# Patient Record
Sex: MALE | Race: OTHER | NOT HISPANIC OR LATINO
[De-identification: names, ages, dates, MRNs, and addresses within clinical notes are randomized per-mention and may not be internally consistent; named-entity substitution may affect disease eponyms.]

---

## 2017-01-09 ENCOUNTER — MEDICATION RENEWAL (OUTPATIENT)
Age: 72
End: 2017-01-09

## 2017-01-09 ENCOUNTER — NON-APPOINTMENT (OUTPATIENT)
Age: 72
End: 2017-01-09

## 2017-01-09 ENCOUNTER — APPOINTMENT (OUTPATIENT)
Dept: CARDIOLOGY | Facility: CLINIC | Age: 72
End: 2017-01-09

## 2017-01-09 VITALS
DIASTOLIC BLOOD PRESSURE: 74 MMHG | BODY MASS INDEX: 22.58 KG/M2 | SYSTOLIC BLOOD PRESSURE: 132 MMHG | HEART RATE: 66 BPM | HEIGHT: 68 IN | OXYGEN SATURATION: 96 % | WEIGHT: 149 LBS

## 2017-01-09 LAB — INR PPP: 2.7

## 2017-01-11 ENCOUNTER — MEDICATION RENEWAL (OUTPATIENT)
Age: 72
End: 2017-01-11

## 2017-01-11 ENCOUNTER — APPOINTMENT (OUTPATIENT)
Dept: INTERNAL MEDICINE | Facility: CLINIC | Age: 72
End: 2017-01-11

## 2017-01-11 VITALS
OXYGEN SATURATION: 97 % | WEIGHT: 148 LBS | SYSTOLIC BLOOD PRESSURE: 132 MMHG | RESPIRATION RATE: 14 BRPM | DIASTOLIC BLOOD PRESSURE: 68 MMHG | BODY MASS INDEX: 22.43 KG/M2 | HEIGHT: 68 IN | HEART RATE: 60 BPM

## 2017-01-12 LAB
25(OH)D3 SERPL-MCNC: 23.8 NG/ML
ALBUMIN SERPL ELPH-MCNC: 4.5 G/DL
ALP BLD-CCNC: 83 U/L
ALT SERPL-CCNC: 26 U/L
ANION GAP SERPL CALC-SCNC: 14 MMOL/L
APPEARANCE: CLEAR
AST SERPL-CCNC: 33 U/L
BASOPHILS # BLD AUTO: 0.02 K/UL
BASOPHILS NFR BLD AUTO: 0.4 %
BILIRUB SERPL-MCNC: 0.7 MG/DL
BILIRUBIN URINE: NEGATIVE
BLOOD URINE: NEGATIVE
BUN SERPL-MCNC: 9 MG/DL
CALCIUM SERPL-MCNC: 9.2 MG/DL
CHLORIDE SERPL-SCNC: 97 MMOL/L
CHOLEST SERPL-MCNC: 147 MG/DL
CHOLEST/HDLC SERPL: 1.5 RATIO
CO2 SERPL-SCNC: 27 MMOL/L
COLOR: YELLOW
CREAT SERPL-MCNC: 0.8 MG/DL
EOSINOPHIL # BLD AUTO: 0.22 K/UL
EOSINOPHIL NFR BLD AUTO: 4.7 %
FOLATE SERPL-MCNC: 11.7 NG/ML
GLUCOSE QUALITATIVE U: NORMAL MG/DL
GLUCOSE SERPL-MCNC: 111 MG/DL
HBA1C MFR BLD HPLC: 6.8 %
HCT VFR BLD CALC: 45.8 %
HDLC SERPL-MCNC: 98 MG/DL
HGB BLD-MCNC: 14.7 G/DL
IMM GRANULOCYTES NFR BLD AUTO: 0.2 %
KETONES URINE: NEGATIVE
LDLC SERPL CALC-MCNC: 40 MG/DL
LEUKOCYTE ESTERASE URINE: NEGATIVE
LYMPHOCYTES # BLD AUTO: 0.95 K/UL
LYMPHOCYTES NFR BLD AUTO: 20.3 %
MAN DIFF?: NORMAL
MCHC RBC-ENTMCNC: 29.6 PG
MCHC RBC-ENTMCNC: 32.1 GM/DL
MCV RBC AUTO: 92.3 FL
MONOCYTES # BLD AUTO: 0.4 K/UL
MONOCYTES NFR BLD AUTO: 8.6 %
NEUTROPHILS # BLD AUTO: 3.07 K/UL
NEUTROPHILS NFR BLD AUTO: 65.8 %
NITRITE URINE: NEGATIVE
PH URINE: 6
PLATELET # BLD AUTO: 117 K/UL
POTASSIUM SERPL-SCNC: 4.1 MMOL/L
PROT SERPL-MCNC: 6.7 G/DL
PROTEIN URINE: NEGATIVE MG/DL
PSA SERPL-MCNC: 2.47 NG/ML
RBC # BLD: 4.96 M/UL
RBC # FLD: 13.9 %
SODIUM SERPL-SCNC: 138 MMOL/L
SPECIFIC GRAVITY URINE: 1.01
TRIGL SERPL-MCNC: 47 MG/DL
TSH SERPL-ACNC: 2.16 UIU/ML
URATE SERPL-MCNC: 3.1 MG/DL
UROBILINOGEN URINE: NORMAL MG/DL
VIT B12 SERPL-MCNC: 293 PG/ML
WBC # FLD AUTO: 4.67 K/UL

## 2017-01-26 ENCOUNTER — RX RENEWAL (OUTPATIENT)
Age: 72
End: 2017-01-26

## 2017-02-01 ENCOUNTER — MEDICATION RENEWAL (OUTPATIENT)
Age: 72
End: 2017-02-01

## 2017-02-27 LAB — INR PPP: 3.7

## 2017-03-17 LAB — INR PPP: 2.4

## 2017-03-31 LAB — INR PPP: 2.4

## 2017-04-06 ENCOUNTER — RX RENEWAL (OUTPATIENT)
Age: 72
End: 2017-04-06

## 2017-04-30 LAB — INR PPP: 2.5

## 2017-05-05 ENCOUNTER — APPOINTMENT (OUTPATIENT)
Dept: CARDIOLOGY | Facility: CLINIC | Age: 72
End: 2017-05-05

## 2017-05-10 ENCOUNTER — APPOINTMENT (OUTPATIENT)
Dept: CARDIOLOGY | Facility: CLINIC | Age: 72
End: 2017-05-10

## 2017-05-18 ENCOUNTER — APPOINTMENT (OUTPATIENT)
Dept: INTERNAL MEDICINE | Facility: CLINIC | Age: 72
End: 2017-05-18

## 2017-05-18 VITALS
BODY MASS INDEX: 22.73 KG/M2 | HEART RATE: 71 BPM | DIASTOLIC BLOOD PRESSURE: 62 MMHG | HEIGHT: 68 IN | SYSTOLIC BLOOD PRESSURE: 120 MMHG | OXYGEN SATURATION: 98 % | RESPIRATION RATE: 14 BRPM | TEMPERATURE: 98.4 F | WEIGHT: 150 LBS

## 2017-05-18 DIAGNOSIS — E56.9 VITAMIN DEFICIENCY, UNSPECIFIED: ICD-10-CM

## 2017-05-18 LAB — INR PPP: 4

## 2017-06-05 LAB — INR PPP: 2.4

## 2017-06-09 LAB
25(OH)D3 SERPL-MCNC: 33.8 NG/ML
ALBUMIN SERPL ELPH-MCNC: 4.6 G/DL
ALP BLD-CCNC: 90 U/L
ALT SERPL-CCNC: 35 U/L
ANION GAP SERPL CALC-SCNC: 11 MMOL/L
APPEARANCE: CLEAR
AST SERPL-CCNC: 29 U/L
BASOPHILS # BLD AUTO: 0.03 K/UL
BASOPHILS NFR BLD AUTO: 0.8 %
BILIRUB SERPL-MCNC: 0.7 MG/DL
BILIRUBIN URINE: NEGATIVE
BLOOD URINE: NEGATIVE
BUN SERPL-MCNC: 10 MG/DL
CALCIUM SERPL-MCNC: 9.5 MG/DL
CHLORIDE SERPL-SCNC: 98 MMOL/L
CHOLEST SERPL-MCNC: 133 MG/DL
CHOLEST/HDLC SERPL: 1.6 RATIO
CO2 SERPL-SCNC: 25 MMOL/L
COLOR: YELLOW
CREAT SERPL-MCNC: 0.92 MG/DL
CREAT SPEC-SCNC: 99 MG/DL
EOSINOPHIL # BLD AUTO: 0.26 K/UL
EOSINOPHIL NFR BLD AUTO: 6.8 %
FOLATE SERPL-MCNC: 13.2 NG/ML
GLUCOSE QUALITATIVE U: NORMAL MG/DL
GLUCOSE SERPL-MCNC: 152 MG/DL
HBA1C MFR BLD HPLC: 7.5 %
HCT VFR BLD CALC: 42.5 %
HDLC SERPL-MCNC: 81 MG/DL
HGB BLD-MCNC: 13.8 G/DL
IMM GRANULOCYTES NFR BLD AUTO: 0.5 %
KETONES URINE: NEGATIVE
LDLC SERPL CALC-MCNC: 43 MG/DL
LEUKOCYTE ESTERASE URINE: NEGATIVE
LYMPHOCYTES # BLD AUTO: 1.04 K/UL
LYMPHOCYTES NFR BLD AUTO: 27.2 %
MAN DIFF?: NORMAL
MCHC RBC-ENTMCNC: 29 PG
MCHC RBC-ENTMCNC: 32.5 GM/DL
MCV RBC AUTO: 89.3 FL
MICROALBUMIN 24H UR DL<=1MG/L-MCNC: 3.3 MG/DL
MICROALBUMIN/CREAT 24H UR-RTO: 33
MONOCYTES # BLD AUTO: 0.34 K/UL
MONOCYTES NFR BLD AUTO: 8.9 %
NEUTROPHILS # BLD AUTO: 2.14 K/UL
NEUTROPHILS NFR BLD AUTO: 55.8 %
NITRITE URINE: NEGATIVE
PH URINE: 6.5
PLATELET # BLD AUTO: 121 K/UL
POTASSIUM SERPL-SCNC: 4.7 MMOL/L
PROT SERPL-MCNC: 6.5 G/DL
PROTEIN URINE: NEGATIVE MG/DL
RBC # BLD: 4.76 M/UL
RBC # FLD: 13.2 %
SODIUM SERPL-SCNC: 134 MMOL/L
SPECIFIC GRAVITY URINE: 1.02
TRIGL SERPL-MCNC: 45 MG/DL
TSH SERPL-ACNC: 2.07 UIU/ML
URATE SERPL-MCNC: 3.1 MG/DL
UROBILINOGEN URINE: 1 MG/DL
VIT B12 SERPL-MCNC: 428 PG/ML
WBC # FLD AUTO: 3.83 K/UL

## 2017-06-14 LAB — INR PPP: 2.6

## 2017-06-28 ENCOUNTER — APPOINTMENT (OUTPATIENT)
Dept: INTERNAL MEDICINE | Facility: CLINIC | Age: 72
End: 2017-06-28

## 2017-06-28 VITALS
TEMPERATURE: 98.1 F | HEIGHT: 68 IN | BODY MASS INDEX: 23.19 KG/M2 | SYSTOLIC BLOOD PRESSURE: 120 MMHG | WEIGHT: 153 LBS | HEART RATE: 68 BPM | DIASTOLIC BLOOD PRESSURE: 70 MMHG | RESPIRATION RATE: 14 BRPM | OXYGEN SATURATION: 98 %

## 2017-07-10 ENCOUNTER — RX RENEWAL (OUTPATIENT)
Age: 72
End: 2017-07-10

## 2017-07-10 LAB — INR PPP: 2.9

## 2017-07-27 ENCOUNTER — APPOINTMENT (OUTPATIENT)
Dept: INTERNAL MEDICINE | Facility: CLINIC | Age: 72
End: 2017-07-27
Payer: MEDICARE

## 2017-07-27 VITALS
HEIGHT: 68 IN | WEIGHT: 153 LBS | SYSTOLIC BLOOD PRESSURE: 100 MMHG | OXYGEN SATURATION: 98 % | TEMPERATURE: 98.1 F | DIASTOLIC BLOOD PRESSURE: 70 MMHG | BODY MASS INDEX: 23.19 KG/M2 | HEART RATE: 66 BPM | RESPIRATION RATE: 14 BRPM

## 2017-07-27 VITALS — SYSTOLIC BLOOD PRESSURE: 114 MMHG | DIASTOLIC BLOOD PRESSURE: 70 MMHG

## 2017-07-27 DIAGNOSIS — Z23 ENCOUNTER FOR IMMUNIZATION: ICD-10-CM

## 2017-07-27 DIAGNOSIS — Z01.818 ENCOUNTER FOR OTHER PREPROCEDURAL EXAMINATION: ICD-10-CM

## 2017-07-27 DIAGNOSIS — J06.9 ACUTE UPPER RESPIRATORY INFECTION, UNSPECIFIED: ICD-10-CM

## 2017-07-27 DIAGNOSIS — Z86.39 PERSONAL HISTORY OF OTHER ENDOCRINE, NUTRITIONAL AND METABOLIC DISEASE: ICD-10-CM

## 2017-07-27 DIAGNOSIS — Z86.59 PERSONAL HISTORY OF OTHER MENTAL AND BEHAVIORAL DISORDERS: ICD-10-CM

## 2017-07-27 LAB — INR PPP: 2.3

## 2017-07-27 PROCEDURE — 99214 OFFICE O/P EST MOD 30 MIN: CPT

## 2017-08-14 LAB — INR PPP: 2.6

## 2017-08-29 ENCOUNTER — APPOINTMENT (OUTPATIENT)
Dept: CARDIOLOGY | Facility: CLINIC | Age: 72
End: 2017-08-29
Payer: MEDICARE

## 2017-08-29 VITALS
WEIGHT: 150 LBS | HEIGHT: 68 IN | BODY MASS INDEX: 22.73 KG/M2 | OXYGEN SATURATION: 100 % | HEART RATE: 68 BPM | DIASTOLIC BLOOD PRESSURE: 80 MMHG | SYSTOLIC BLOOD PRESSURE: 158 MMHG

## 2017-08-29 PROCEDURE — 99214 OFFICE O/P EST MOD 30 MIN: CPT

## 2017-09-04 LAB — INR PPP: 3.4

## 2017-09-23 LAB
ALBUMIN SERPL ELPH-MCNC: 4.1 G/DL
ALP BLD-CCNC: 70 U/L
ALT SERPL-CCNC: 25 U/L
ANION GAP SERPL CALC-SCNC: 17 MMOL/L
AST SERPL-CCNC: 21 U/L
BILIRUB SERPL-MCNC: 0.6 MG/DL
BUN SERPL-MCNC: 12 MG/DL
CALCIUM SERPL-MCNC: 9.6 MG/DL
CHLORIDE SERPL-SCNC: 99 MMOL/L
CHOLEST SERPL-MCNC: 148 MG/DL
CHOLEST/HDLC SERPL: 1.9 RATIO
CO2 SERPL-SCNC: 24 MMOL/L
CREAT SERPL-MCNC: 0.93 MG/DL
GLUCOSE SERPL-MCNC: 119 MG/DL
HBA1C MFR BLD HPLC: 7.3 %
HDLC SERPL-MCNC: 80 MG/DL
LDLC SERPL CALC-MCNC: 54 MG/DL
POTASSIUM SERPL-SCNC: 4.8 MMOL/L
PROT SERPL-MCNC: 6.9 G/DL
PSA SERPL-MCNC: 2.77 NG/ML
SODIUM SERPL-SCNC: 140 MMOL/L
TRIGL SERPL-MCNC: 69 MG/DL

## 2017-09-25 LAB — INR PPP: 3.7

## 2017-10-13 LAB — INR PPP: 2.3

## 2017-10-30 ENCOUNTER — APPOINTMENT (OUTPATIENT)
Dept: INTERNAL MEDICINE | Facility: CLINIC | Age: 72
End: 2017-10-30
Payer: MEDICARE

## 2017-10-30 VITALS
OXYGEN SATURATION: 98 % | TEMPERATURE: 98 F | HEIGHT: 68 IN | RESPIRATION RATE: 14 BRPM | DIASTOLIC BLOOD PRESSURE: 70 MMHG | SYSTOLIC BLOOD PRESSURE: 120 MMHG | BODY MASS INDEX: 22.43 KG/M2 | HEART RATE: 79 BPM | WEIGHT: 148 LBS

## 2017-10-30 DIAGNOSIS — K40.90 UNILATERAL INGUINAL HERNIA, W/OUT OBSTRUCTION OR GANGRENE, NOT SPECIFIED AS RECURRENT: ICD-10-CM

## 2017-10-30 DIAGNOSIS — Z23 ENCOUNTER FOR IMMUNIZATION: ICD-10-CM

## 2017-10-30 PROCEDURE — 90686 IIV4 VACC NO PRSV 0.5 ML IM: CPT

## 2017-10-30 PROCEDURE — 99214 OFFICE O/P EST MOD 30 MIN: CPT | Mod: 25

## 2017-10-30 PROCEDURE — G0008: CPT

## 2017-11-02 LAB — INR PPP: 2.2

## 2017-11-14 LAB — INR PPP: 2.6

## 2017-12-11 LAB — INR PPP: 3.1

## 2018-01-09 LAB — INR PPP: 3.1

## 2018-01-17 ENCOUNTER — APPOINTMENT (OUTPATIENT)
Dept: INTERNAL MEDICINE | Facility: CLINIC | Age: 73
End: 2018-01-17
Payer: MEDICARE

## 2018-01-17 VITALS
RESPIRATION RATE: 14 BRPM | DIASTOLIC BLOOD PRESSURE: 72 MMHG | OXYGEN SATURATION: 97 % | HEART RATE: 71 BPM | WEIGHT: 153 LBS | SYSTOLIC BLOOD PRESSURE: 140 MMHG | HEIGHT: 68 IN | BODY MASS INDEX: 23.19 KG/M2

## 2018-01-17 DIAGNOSIS — Z79.01 LONG TERM (CURRENT) USE OF ANTICOAGULANTS: ICD-10-CM

## 2018-01-17 DIAGNOSIS — K43.9 VENTRAL HERNIA W/OUT OBSTRUCTION OR GANGRENE: ICD-10-CM

## 2018-01-17 DIAGNOSIS — I87.2 VENOUS INSUFFICIENCY (CHRONIC) (PERIPHERAL): ICD-10-CM

## 2018-01-17 DIAGNOSIS — R21 RASH AND OTHER NONSPECIFIC SKIN ERUPTION: ICD-10-CM

## 2018-01-17 DIAGNOSIS — R53.83 OTHER FATIGUE: ICD-10-CM

## 2018-01-17 PROCEDURE — G0439: CPT

## 2018-01-17 RX ORDER — PRAMIPEXOLE DIHYDROCHLORIDE 0.75 MG/1
0.75 TABLET, EXTENDED RELEASE ORAL 3 TIMES DAILY
Qty: 270 | Refills: 3 | Status: ACTIVE | COMMUNITY
Start: 2018-01-17 | End: 1900-01-01

## 2018-01-18 LAB
25(OH)D3 SERPL-MCNC: 34.3 NG/ML
ALBUMIN SERPL ELPH-MCNC: 4.5 G/DL
ALP BLD-CCNC: 78 U/L
ALT SERPL-CCNC: 34 U/L
ANION GAP SERPL CALC-SCNC: 13 MMOL/L
APPEARANCE: CLEAR
AST SERPL-CCNC: 29 U/L
BASOPHILS # BLD AUTO: 0.02 K/UL
BASOPHILS NFR BLD AUTO: 0.4 %
BILIRUB SERPL-MCNC: 0.6 MG/DL
BILIRUBIN URINE: NEGATIVE
BLOOD URINE: NEGATIVE
BUN SERPL-MCNC: 11 MG/DL
CALCIUM SERPL-MCNC: 9.7 MG/DL
CHLORIDE SERPL-SCNC: 96 MMOL/L
CHOLEST SERPL-MCNC: 155 MG/DL
CHOLEST/HDLC SERPL: 1.6 RATIO
CO2 SERPL-SCNC: 27 MMOL/L
COLOR: YELLOW
CREAT SERPL-MCNC: 0.95 MG/DL
CREAT SPEC-SCNC: 69 MG/DL
EOSINOPHIL # BLD AUTO: 0.19 K/UL
EOSINOPHIL NFR BLD AUTO: 3.9 %
FOLATE SERPL-MCNC: 19.1 NG/ML
GLUCOSE QUALITATIVE U: 100 MG/DL
GLUCOSE SERPL-MCNC: 170 MG/DL
HBA1C MFR BLD HPLC: 7.8 %
HCT VFR BLD CALC: 42.9 %
HDLC SERPL-MCNC: 100 MG/DL
HGB BLD-MCNC: 14.1 G/DL
IMM GRANULOCYTES NFR BLD AUTO: 0.2 %
KETONES URINE: NEGATIVE
LDLC SERPL CALC-MCNC: 46 MG/DL
LEUKOCYTE ESTERASE URINE: NEGATIVE
LYMPHOCYTES # BLD AUTO: 0.94 K/UL
LYMPHOCYTES NFR BLD AUTO: 19.1 %
MAN DIFF?: NORMAL
MCHC RBC-ENTMCNC: 29.2 PG
MCHC RBC-ENTMCNC: 32.9 GM/DL
MCV RBC AUTO: 88.8 FL
MICROALBUMIN 24H UR DL<=1MG/L-MCNC: 0.5 MG/DL
MICROALBUMIN/CREAT 24H UR-RTO: 7 MG/G
MONOCYTES # BLD AUTO: 0.38 K/UL
MONOCYTES NFR BLD AUTO: 7.7 %
NEUTROPHILS # BLD AUTO: 3.37 K/UL
NEUTROPHILS NFR BLD AUTO: 68.7 %
NITRITE URINE: NEGATIVE
PH URINE: 6.5
PLATELET # BLD AUTO: 127 K/UL
POTASSIUM SERPL-SCNC: 4.3 MMOL/L
PROT SERPL-MCNC: 7.3 G/DL
PROTEIN URINE: NEGATIVE MG/DL
PSA SERPL-MCNC: 2.55 NG/ML
RBC # BLD: 4.83 M/UL
RBC # FLD: 13.5 %
SODIUM SERPL-SCNC: 136 MMOL/L
SPECIFIC GRAVITY URINE: 1.01
TRIGL SERPL-MCNC: 45 MG/DL
TSH SERPL-ACNC: 2.44 UIU/ML
URATE SERPL-MCNC: 2.9 MG/DL
UROBILINOGEN URINE: NEGATIVE MG/DL
VIT B12 SERPL-MCNC: 472 PG/ML
WBC # FLD AUTO: 4.91 K/UL

## 2018-01-24 ENCOUNTER — NON-APPOINTMENT (OUTPATIENT)
Age: 73
End: 2018-01-24

## 2018-01-24 ENCOUNTER — APPOINTMENT (OUTPATIENT)
Dept: CARDIOLOGY | Facility: CLINIC | Age: 73
End: 2018-01-24
Payer: MEDICARE

## 2018-01-24 VITALS
BODY MASS INDEX: 23.19 KG/M2 | HEART RATE: 72 BPM | HEIGHT: 68 IN | OXYGEN SATURATION: 96 % | SYSTOLIC BLOOD PRESSURE: 125 MMHG | DIASTOLIC BLOOD PRESSURE: 68 MMHG | WEIGHT: 153 LBS

## 2018-01-24 PROCEDURE — 93000 ELECTROCARDIOGRAM COMPLETE: CPT

## 2018-01-24 PROCEDURE — 99214 OFFICE O/P EST MOD 30 MIN: CPT

## 2018-02-01 ENCOUNTER — APPOINTMENT (OUTPATIENT)
Dept: DERMATOLOGY | Facility: CLINIC | Age: 73
End: 2018-02-01
Payer: MEDICARE

## 2018-02-01 VITALS
BODY MASS INDEX: 22.73 KG/M2 | SYSTOLIC BLOOD PRESSURE: 130 MMHG | DIASTOLIC BLOOD PRESSURE: 82 MMHG | HEIGHT: 68 IN | WEIGHT: 150 LBS

## 2018-02-01 PROCEDURE — 99203 OFFICE O/P NEW LOW 30 MIN: CPT

## 2018-02-02 LAB — INR PPP: 2.4

## 2018-02-27 LAB — INR PPP: 2.6

## 2018-03-01 ENCOUNTER — APPOINTMENT (OUTPATIENT)
Dept: DERMATOLOGY | Facility: CLINIC | Age: 73
End: 2018-03-01

## 2018-03-12 ENCOUNTER — RX RENEWAL (OUTPATIENT)
Age: 73
End: 2018-03-12

## 2018-03-19 LAB — INR PPP: 2.8

## 2018-04-11 LAB — INR PPP: 3.4

## 2018-04-30 LAB — INR PPP: 4

## 2018-05-22 LAB — INR PPP: 3

## 2018-06-19 LAB — INR PPP: 3

## 2018-07-16 ENCOUNTER — APPOINTMENT (OUTPATIENT)
Dept: CARDIOLOGY | Facility: CLINIC | Age: 73
End: 2018-07-16
Payer: MEDICARE

## 2018-07-16 VITALS
BODY MASS INDEX: 22.88 KG/M2 | HEIGHT: 68 IN | SYSTOLIC BLOOD PRESSURE: 164 MMHG | HEART RATE: 75 BPM | OXYGEN SATURATION: 98 % | WEIGHT: 151 LBS | DIASTOLIC BLOOD PRESSURE: 78 MMHG

## 2018-07-16 LAB
INR PPP: 4
INR PPP: 5.6

## 2018-07-16 PROCEDURE — 99214 OFFICE O/P EST MOD 30 MIN: CPT

## 2018-07-30 LAB — INR PPP: 2.5

## 2018-08-21 ENCOUNTER — APPOINTMENT (OUTPATIENT)
Dept: CARDIOLOGY | Facility: CLINIC | Age: 73
End: 2018-08-21

## 2018-08-22 ENCOUNTER — RESULT REVIEW (OUTPATIENT)
Age: 73
End: 2018-08-22

## 2018-08-22 LAB — INR PPP: 3.7

## 2018-09-04 ENCOUNTER — RX RENEWAL (OUTPATIENT)
Age: 73
End: 2018-09-04

## 2018-09-10 LAB — INR PPP: 2.1

## 2018-10-04 LAB — INR PPP: 4.7

## 2018-10-31 LAB — INR PPP: 3.5

## 2018-12-17 ENCOUNTER — MEDICATION RENEWAL (OUTPATIENT)
Age: 73
End: 2018-12-17

## 2018-12-17 LAB — INR PPP: 1.9

## 2018-12-27 LAB — INR PPP: 2.2

## 2019-01-03 ENCOUNTER — APPOINTMENT (OUTPATIENT)
Dept: CARDIOLOGY | Facility: CLINIC | Age: 74
End: 2019-01-03
Payer: MEDICARE

## 2019-01-03 ENCOUNTER — NON-APPOINTMENT (OUTPATIENT)
Age: 74
End: 2019-01-03

## 2019-01-03 VITALS
SYSTOLIC BLOOD PRESSURE: 133 MMHG | OXYGEN SATURATION: 99 % | DIASTOLIC BLOOD PRESSURE: 77 MMHG | HEART RATE: 75 BPM | BODY MASS INDEX: 22.73 KG/M2 | HEIGHT: 68 IN | WEIGHT: 150 LBS

## 2019-01-03 DIAGNOSIS — R07.9 CHEST PAIN, UNSPECIFIED: ICD-10-CM

## 2019-01-03 PROCEDURE — 99215 OFFICE O/P EST HI 40 MIN: CPT

## 2019-01-03 NOTE — PHYSICAL EXAM
[General Appearance - Well Developed] : well developed [Normal Appearance] : normal appearance [Well Groomed] : well groomed [General Appearance - Well Nourished] : well nourished [No Deformities] : no deformities [General Appearance - In No Acute Distress] : no acute distress [Normal Conjunctiva] : the conjunctiva exhibited no abnormalities [Normal Oral Mucosa] : normal oral mucosa [Normal Jugular Venous A Waves Present] : normal jugular venous A waves present [Normal Jugular Venous V Waves Present] : normal jugular venous V waves present [No Jugular Venous Weathers A Waves] : no jugular venous weathers A waves [Respiration, Rhythm And Depth] : normal respiratory rhythm and effort [Exaggerated Use Of Accessory Muscles For Inspiration] : no accessory muscle use [Auscultation Breath Sounds / Voice Sounds] : lungs were clear to auscultation bilaterally [Bowel Sounds] : normal bowel sounds [Abdomen Soft] : soft [Abdomen Tenderness] : non-tender [Abnormal Walk] : normal gait [Nail Clubbing] : no clubbing of the fingernails [Cyanosis, Localized] : no localized cyanosis [Skin Color & Pigmentation] : normal skin color and pigmentation [Skin Turgor] : normal skin turgor [] : no rash [Oriented To Time, Place, And Person] : oriented to person, place, and time [Impaired Insight] : insight and judgment were intact [No Anxiety] : not feeling anxious [5th Left ICS - MCL] : palpated at the 5th LICS in the midclavicular line [Normal] : normal [No Precordial Heave] : no precordial heave was noted [Normal Rate] : normal [Rhythm Regular] : regular [Normal S1] : normal S1 [Normal S2] : normal S2 [No Gallop] : no gallop heard [Prosthetic Aortic Valve] : prosthetic aortic valve heard [III] : a grade 3 [2+] : left 2+ [1+] : right 1+ [No Abnormalities] : the abdominal aorta was not enlarged and no bruit was heard [No Pitting Edema] : no pitting edema present [Apical Thrill] : no thrill palpable at the apex

## 2019-01-03 NOTE — REVIEW OF SYSTEMS
[Eyeglasses] : currently wearing eyeglasses [Urinary Frequency] : urinary frequency [Tremor] : a tremor was seen [Easy Bleeding] : a tendency for easy bleeding [Negative] : Genitourinary [Fever] : no fever [Headache] : no headache [Chills] : no chills [Feeling Fatigued] : not feeling fatigued [Blurry Vision] : no blurred vision [Seeing Double (Diplopia)] : no diplopia [Earache] : no earache [Sore Throat] : no sore throat [Sinus Pressure] : no sinus pressure [Joint Pain] : no joint pain [Skin: A Rash] : no rash: [Numbness (Hypesthesia)] : no numbness [Tingling (Paresthesia)] : no tingling [Depression] : no depression [Anxiety] : no anxiety [Excessive Thirst] : no polydipsia [Easy Bruising] : no tendency for easy bruising

## 2019-01-03 NOTE — HISTORY OF PRESENT ILLNESS
[FreeTextEntry1] : I saw Roxy Lyons in the office today for a routine cardiac followup. He is a 73-year-old  man who is status post aortic valve replacement in 2004 with a mechanical valve for bicuspid aortic stenosis. At that time he had a bypass to the circumflex artery. The other arteries showed nonobstructive disease. He also is being treated for hyperlipidemia, and hypertension.\par \par His Parkinson's disease limits his physical activity.  Otherwise he has no chest pain, lightheadedness, palpitations.  More recently he has noted increasing dyspnea on exertion.. He had an echocardiogram performed 5/17 that demonstrated ejection fraction of 70%. Peak aortic gradient increased from 41-55 stage II diastolic dysfunction.  He had a stress test in 8/15 that showed no ischemia to a workload of 12.8 METs. He had carotid Doppler 5/17  that showed mild plaque without change compared to 2014.\par \par Most recent blood work performed 1/18 demonstrates a total cholesterol of 155, triglycerides 145, , LDL 46. A1c was 7.8.The patient has been eating more sugars in the diet lately which he thinks accounts for the increase in the A1c. He is going to try to control this better.\par \par The patient's blood pressure today is excellent. His having no symptoms of orthostatic hypotension. Unfortunately his Parkinson's disease is getting progressively worse..\par \par Over the past 6 months the patient has noted a slight discomfort in his back area by the neck when he exercises. After about 4 minutes on the treadmill it seems to go away. There is no anterior chest pain and no shortness of breath. He otherwise has no other new symptoms.

## 2019-01-03 NOTE — REASON FOR VISIT
[Follow-Up - Clinic] : a clinic follow-up of [Aortic Stenosis] : aortic stenosis [Coronary Artery Disease] : coronary artery disease [Hyperlipidemia] : hyperlipidemia [Prosthetic Valve] : a prosthetic valve [FreeTextEntry1] : Parkinson's disease, diabetes

## 2019-01-09 ENCOUNTER — RESULT REVIEW (OUTPATIENT)
Age: 74
End: 2019-01-09

## 2019-01-09 LAB — INR PPP: 4.7

## 2019-01-10 ENCOUNTER — APPOINTMENT (OUTPATIENT)
Dept: CARDIOLOGY | Facility: CLINIC | Age: 74
End: 2019-01-10
Payer: MEDICARE

## 2019-01-10 PROCEDURE — 93306 TTE W/DOPPLER COMPLETE: CPT

## 2019-01-12 ENCOUNTER — RX RENEWAL (OUTPATIENT)
Age: 74
End: 2019-01-12

## 2019-01-18 ENCOUNTER — APPOINTMENT (OUTPATIENT)
Dept: INTERNAL MEDICINE | Facility: CLINIC | Age: 74
End: 2019-01-18
Payer: MEDICARE

## 2019-01-18 VITALS
RESPIRATION RATE: 14 BRPM | HEART RATE: 64 BPM | OXYGEN SATURATION: 98 % | HEIGHT: 68 IN | DIASTOLIC BLOOD PRESSURE: 70 MMHG | TEMPERATURE: 97.8 F | BODY MASS INDEX: 22.13 KG/M2 | SYSTOLIC BLOOD PRESSURE: 112 MMHG | WEIGHT: 146 LBS

## 2019-01-18 LAB
BASOPHILS # BLD AUTO: 0.01 K/UL
BASOPHILS NFR BLD AUTO: 0.1 %
EOSINOPHIL # BLD AUTO: 0.19 K/UL
EOSINOPHIL NFR BLD AUTO: 2.8 %
HCT VFR BLD CALC: 42.3 %
HGB BLD-MCNC: 14 G/DL
IMM GRANULOCYTES NFR BLD AUTO: 0.1 %
LYMPHOCYTES # BLD AUTO: 0.87 K/UL
LYMPHOCYTES NFR BLD AUTO: 12.8 %
MAN DIFF?: NORMAL
MCHC RBC-ENTMCNC: 29.2 PG
MCHC RBC-ENTMCNC: 33.1 GM/DL
MCV RBC AUTO: 88.1 FL
MONOCYTES # BLD AUTO: 0.34 K/UL
MONOCYTES NFR BLD AUTO: 5 %
NEUTROPHILS # BLD AUTO: 5.38 K/UL
NEUTROPHILS NFR BLD AUTO: 79.2 %
PLATELET # BLD AUTO: 138 K/UL
RBC # BLD: 4.8 M/UL
RBC # FLD: 13.3 %
WBC # FLD AUTO: 6.8 K/UL

## 2019-01-18 PROCEDURE — G0439: CPT

## 2019-01-18 NOTE — HISTORY OF PRESENT ILLNESS
[FreeTextEntry1] : cpe [de-identified] : Pt is here for cpe. He checks home glucose. He has an endocrinologist and has appt next week. FG = 130 at home. Sees Dr Weiner.\par \par Last colonoscopy was in his early 50's.\par \par He saw a urologist last year.

## 2019-01-18 NOTE — PHYSICAL EXAM
[No Acute Distress] : no acute distress [Well Nourished] : well nourished [Well Developed] : well developed [Well-Appearing] : well-appearing [Normal Sclera/Conjunctiva] : normal sclera/conjunctiva [PERRL] : pupils equal round and reactive to light [EOMI] : extraocular movements intact [Normal Outer Ear/Nose] : the outer ears and nose were normal in appearance [Normal Oropharynx] : the oropharynx was normal [Normal TMs] : both tympanic membranes were normal [No JVD] : no jugular venous distention [Supple] : supple [No Lymphadenopathy] : no lymphadenopathy [Thyroid Normal, No Nodules] : the thyroid was normal and there were no nodules present [No Respiratory Distress] : no respiratory distress  [Clear to Auscultation] : lungs were clear to auscultation bilaterally [No Accessory Muscle Use] : no accessory muscle use [Normal Rate] : normal rate  [Regular Rhythm] : with a regular rhythm [Normal S1, S2] : normal S1 and S2 [No Murmur] : no murmur heard [Pedal Pulses Present] : the pedal pulses are present [No Edema] : there was no peripheral edema [Soft] : abdomen soft [Non Tender] : non-tender [No Joint Swelling] : no joint swelling [Grossly Normal Strength/Tone] : grossly normal strength/tone [Normal Affect] : the affect was normal [Normal Insight/Judgement] : insight and judgment were intact

## 2019-01-18 NOTE — HEALTH RISK ASSESSMENT
[] : No [No falls in past year] : Patient reported no falls in the past year [0] : 2) Feeling down, depressed, or hopeless: Not at all (0) [Change in mental status noted] : No change in mental status noted [Language] : denies difficulty with language [None] : None [Fully functional (bathing, dressing, toileting, transferring, walking, feeding)] : Fully functional (bathing, dressing, toileting, transferring, walking, feeding) [Fully functional (using the telephone, shopping, preparing meals, housekeeping, doing laundry, using] : Fully functional and needs no help or supervision to perform IADLs (using the telephone, shopping, preparing meals, housekeeping, doing laundry, using transportation, managing medications and managing finances) [Reports changes in hearing] : Reports no changes in hearing [Reports changes in vision] : Reports no changes in vision [Reports changes in dental health] : Reports no changes in dental health

## 2019-01-19 LAB
25(OH)D3 SERPL-MCNC: 29.6 NG/ML
ALBUMIN SERPL ELPH-MCNC: 4.2 G/DL
ALP BLD-CCNC: 68 U/L
ALT SERPL-CCNC: 26 U/L
ANION GAP SERPL CALC-SCNC: 8 MMOL/L
APPEARANCE: CLEAR
AST SERPL-CCNC: 27 U/L
BILIRUB SERPL-MCNC: 0.6 MG/DL
BILIRUBIN URINE: NEGATIVE
BLOOD URINE: NEGATIVE
BUN SERPL-MCNC: 9 MG/DL
CALCIUM SERPL-MCNC: 9.4 MG/DL
CHLORIDE SERPL-SCNC: 101 MMOL/L
CHOLEST SERPL-MCNC: 139 MG/DL
CHOLEST/HDLC SERPL: 1.9 RATIO
CO2 SERPL-SCNC: 29 MMOL/L
COLOR: YELLOW
CREAT SERPL-MCNC: 0.85 MG/DL
CREAT SPEC-SCNC: 125 MG/DL
FOLATE SERPL-MCNC: 11.7 NG/ML
GLUCOSE QUALITATIVE U: 100 MG/DL
GLUCOSE SERPL-MCNC: 159 MG/DL
HBA1C MFR BLD HPLC: 7.8 %
HDLC SERPL-MCNC: 74 MG/DL
KETONES URINE: NEGATIVE
LDLC SERPL CALC-MCNC: 49 MG/DL
LEUKOCYTE ESTERASE URINE: NEGATIVE
MICROALBUMIN 24H UR DL<=1MG/L-MCNC: 1.3 MG/DL
MICROALBUMIN/CREAT 24H UR-RTO: 10 MG/G
NITRITE URINE: NEGATIVE
PH URINE: 7
POTASSIUM SERPL-SCNC: 4.8 MMOL/L
PROT SERPL-MCNC: 6.8 G/DL
PROTEIN URINE: NEGATIVE MG/DL
PSA SERPL-MCNC: 2.74 NG/ML
SODIUM SERPL-SCNC: 138 MMOL/L
SPECIFIC GRAVITY URINE: 1.02
TRIGL SERPL-MCNC: 82 MG/DL
TSH SERPL-ACNC: 2.35 UIU/ML
URATE SERPL-MCNC: 2.9 MG/DL
UROBILINOGEN URINE: 1 MG/DL
VIT B12 SERPL-MCNC: 302 PG/ML

## 2019-01-23 ENCOUNTER — APPOINTMENT (OUTPATIENT)
Dept: CARDIOLOGY | Facility: CLINIC | Age: 74
End: 2019-01-23
Payer: MEDICARE

## 2019-01-23 PROCEDURE — A9500: CPT

## 2019-01-23 PROCEDURE — 93015 CV STRESS TEST SUPVJ I&R: CPT

## 2019-01-23 PROCEDURE — 78452 HT MUSCLE IMAGE SPECT MULT: CPT

## 2019-01-30 ENCOUNTER — RESULT REVIEW (OUTPATIENT)
Age: 74
End: 2019-01-30

## 2019-02-04 LAB — INR PPP: 3.1

## 2019-02-07 ENCOUNTER — APPOINTMENT (OUTPATIENT)
Dept: INTERNAL MEDICINE | Facility: CLINIC | Age: 74
End: 2019-02-07
Payer: MEDICARE

## 2019-02-07 VITALS
OXYGEN SATURATION: 98 % | HEART RATE: 67 BPM | DIASTOLIC BLOOD PRESSURE: 78 MMHG | RESPIRATION RATE: 14 BRPM | HEIGHT: 68 IN | WEIGHT: 150 LBS | BODY MASS INDEX: 22.73 KG/M2 | TEMPERATURE: 97.5 F | SYSTOLIC BLOOD PRESSURE: 130 MMHG

## 2019-02-07 DIAGNOSIS — R51 HEADACHE: ICD-10-CM

## 2019-02-07 PROCEDURE — 99214 OFFICE O/P EST MOD 30 MIN: CPT

## 2019-02-07 RX ORDER — AZITHROMYCIN 250 MG/1
250 TABLET, FILM COATED ORAL
Qty: 6 | Refills: 0 | Status: DISCONTINUED | COMMUNITY
Start: 2018-08-20 | End: 2019-02-07

## 2019-02-07 NOTE — HISTORY OF PRESENT ILLNESS
[FreeTextEntry8] : cc: headache\par \par Pt c/o sharp pain on top of head when he is in bed. Wakes him up and lasts 10-15 minutes. Happens about once a week. No nausea, dizziness, blurred vision. He has Parkinson's disease. It is happening more often over the last 6 months.\par \par When walking on treadmill he gets a funny feeling in his head.

## 2019-02-07 NOTE — PHYSICAL EXAM
[No Acute Distress] : no acute distress [Well Nourished] : well nourished [Well Developed] : well developed [Normal Oropharynx] : the oropharynx was normal [Normal TMs] : both tympanic membranes were normal [Supple] : supple [No Lymphadenopathy] : no lymphadenopathy [No Respiratory Distress] : no respiratory distress  [Clear to Auscultation] : lungs were clear to auscultation bilaterally [Normal Rate] : normal rate  [Regular Rhythm] : with a regular rhythm [No Edema] : there was no peripheral edema [Soft] : abdomen soft [Non Tender] : non-tender [Normal Affect] : the affect was normal [Normal Insight/Judgement] : insight and judgment were intact

## 2019-02-15 LAB — INR PPP: 3

## 2019-02-26 ENCOUNTER — APPOINTMENT (OUTPATIENT)
Dept: INTERNAL MEDICINE | Facility: CLINIC | Age: 74
End: 2019-02-26
Payer: MEDICARE

## 2019-02-26 VITALS
HEIGHT: 68 IN | OXYGEN SATURATION: 98 % | BODY MASS INDEX: 23.04 KG/M2 | WEIGHT: 152 LBS | RESPIRATION RATE: 14 BRPM | DIASTOLIC BLOOD PRESSURE: 74 MMHG | SYSTOLIC BLOOD PRESSURE: 112 MMHG | TEMPERATURE: 97.9 F | HEART RATE: 76 BPM

## 2019-02-26 DIAGNOSIS — Z87.09 PERSONAL HISTORY OF OTHER DISEASES OF THE RESPIRATORY SYSTEM: ICD-10-CM

## 2019-02-26 DIAGNOSIS — J30.9 ALLERGIC RHINITIS, UNSPECIFIED: ICD-10-CM

## 2019-02-26 PROCEDURE — 99214 OFFICE O/P EST MOD 30 MIN: CPT

## 2019-02-26 NOTE — PHYSICAL EXAM

## 2019-02-26 NOTE — HISTORY OF PRESENT ILLNESS
[FreeTextEntry1] : Has persistent cough for three weeks no fever or chills\par no wheezing no SOB \par worse at night \par has post nasal drip  \par

## 2019-02-28 ENCOUNTER — TRANSCRIPTION ENCOUNTER (OUTPATIENT)
Age: 74
End: 2019-02-28

## 2019-03-05 ENCOUNTER — APPOINTMENT (OUTPATIENT)
Dept: OTOLARYNGOLOGY | Facility: CLINIC | Age: 74
End: 2019-03-05

## 2019-03-19 ENCOUNTER — APPOINTMENT (OUTPATIENT)
Dept: FAMILY MEDICINE | Facility: CLINIC | Age: 74
End: 2019-03-19
Payer: MEDICARE

## 2019-03-19 VITALS
BODY MASS INDEX: 22.81 KG/M2 | SYSTOLIC BLOOD PRESSURE: 140 MMHG | RESPIRATION RATE: 14 BRPM | HEART RATE: 85 BPM | TEMPERATURE: 97.7 F | OXYGEN SATURATION: 98 % | DIASTOLIC BLOOD PRESSURE: 76 MMHG | WEIGHT: 150 LBS

## 2019-03-19 DIAGNOSIS — R19.7 DIARRHEA, UNSPECIFIED: ICD-10-CM

## 2019-03-19 LAB — INR PPP: 3.1

## 2019-03-19 PROCEDURE — 99213 OFFICE O/P EST LOW 20 MIN: CPT

## 2019-03-19 RX ORDER — BENZONATATE 100 MG/1
100 CAPSULE ORAL 3 TIMES DAILY
Qty: 21 | Refills: 0 | Status: DISCONTINUED | COMMUNITY
Start: 2019-02-07 | End: 2019-03-19

## 2019-03-19 RX ORDER — PREDNISOLONE ACETATE 10 MG/ML
1 SUSPENSION/ DROPS OPHTHALMIC
Qty: 5 | Refills: 0 | Status: DISCONTINUED | COMMUNITY
Start: 2018-09-21 | End: 2019-03-19

## 2019-03-19 RX ORDER — NYSTATIN AND TRIAMCINOLONE ACETONIDE 100000; 1 MG/G; MG/G
100000-0.1 CREAM TOPICAL TWICE DAILY
Qty: 1 | Refills: 0 | Status: DISCONTINUED | COMMUNITY
Start: 2018-01-17 | End: 2019-03-19

## 2019-03-19 RX ORDER — BETAMETHASONE DIPROPIONATE 0.5 MG/G
0.05 CREAM TOPICAL
Qty: 50 | Refills: 0 | Status: DISCONTINUED | COMMUNITY
Start: 2017-01-11 | End: 2019-03-19

## 2019-03-19 RX ORDER — DOXYCYCLINE 100 MG/1
100 TABLET, FILM COATED ORAL
Qty: 14 | Refills: 0 | Status: DISCONTINUED | COMMUNITY
Start: 2019-02-28 | End: 2019-03-19

## 2019-03-19 NOTE — PHYSICAL EXAM
[No Acute Distress] : no acute distress [Well Nourished] : well nourished [Well Developed] : well developed [Well-Appearing] : well-appearing [Normal Sclera/Conjunctiva] : normal sclera/conjunctiva [PERRL] : pupils equal round and reactive to light [EOMI] : extraocular movements intact [Normal Oropharynx] : the oropharynx was normal [Normal Outer Ear/Nose] : the outer ears and nose were normal in appearance [No JVD] : no jugular venous distention [Supple] : supple [No Lymphadenopathy] : no lymphadenopathy [Thyroid Normal, No Nodules] : the thyroid was normal and there were no nodules present [No Respiratory Distress] : no respiratory distress  [Clear to Auscultation] : lungs were clear to auscultation bilaterally [No Accessory Muscle Use] : no accessory muscle use [Normal Rate] : normal rate  [No Murmur] : no murmur heard [Normal S1, S2] : normal S1 and S2 [Regular Rhythm] : with a regular rhythm [No Carotid Bruits] : no carotid bruits [No Abdominal Bruit] : a ~M bruit was not heard ~T in the abdomen [No Varicosities] : no varicosities [No Edema] : there was no peripheral edema [Pedal Pulses Present] : the pedal pulses are present [Soft] : abdomen soft [No Palpable Aorta] : no palpable aorta [No Extremity Clubbing/Cyanosis] : no extremity clubbing/cyanosis [Non Tender] : non-tender [No Masses] : no abdominal mass palpated [Non-distended] : non-distended [Normal Bowel Sounds] : normal bowel sounds [No HSM] : no HSM [Normal Anterior Cervical Nodes] : no anterior cervical lymphadenopathy [Normal Posterior Cervical Nodes] : no posterior cervical lymphadenopathy [No CVA Tenderness] : no CVA  tenderness [No Spinal Tenderness] : no spinal tenderness [No Joint Swelling] : no joint swelling [Grossly Normal Strength/Tone] : grossly normal strength/tone [Normal Gait] : normal gait [No Rash] : no rash [Coordination Grossly Intact] : coordination grossly intact [Deep Tendon Reflexes (DTR)] : deep tendon reflexes were 2+ and symmetric [No Focal Deficits] : no focal deficits [Normal Affect] : the affect was normal [Normal Insight/Judgement] : insight and judgment were intact

## 2019-03-19 NOTE — PLAN
[FreeTextEntry1] : -viral gastro vs c diff \par -abdominal exam benign, non-tender \par -advised probiotic, BRAT diet, and staying well hydrated

## 2019-03-19 NOTE — HISTORY OF PRESENT ILLNESS
[FreeTextEntry8] : Pt with 4 days of loose stools. Took immodium which improved but then came back. No associated abdominal pain, just bloating and gas. Went 5 times last night but not during the day. Also having decreased appetite. Took doxy at end of february for bronchitis. No fevers or chills. No blood in the stool.

## 2019-03-20 LAB
ALBUMIN SERPL ELPH-MCNC: 4.3 G/DL
ALP BLD-CCNC: 74 U/L
ALT SERPL-CCNC: 23 U/L
ANION GAP SERPL CALC-SCNC: 14 MMOL/L
AST SERPL-CCNC: 24 U/L
BASOPHILS # BLD AUTO: 0.02 K/UL
BASOPHILS NFR BLD AUTO: 0.4 %
BILIRUB SERPL-MCNC: 0.8 MG/DL
BUN SERPL-MCNC: 11 MG/DL
C DIFF TOX GENS STL QL NAA+PROBE: NORMAL
CALCIUM SERPL-MCNC: 9.3 MG/DL
CDIFF BY PCR: NOT DETECTED
CHLORIDE SERPL-SCNC: 101 MMOL/L
CO2 SERPL-SCNC: 23 MMOL/L
CREAT SERPL-MCNC: 0.93 MG/DL
EOSINOPHIL # BLD AUTO: 0.26 K/UL
EOSINOPHIL NFR BLD AUTO: 4.9 %
GLUCOSE SERPL-MCNC: 117 MG/DL
HCT VFR BLD CALC: 44.6 %
HGB BLD-MCNC: 14.1 G/DL
IMM GRANULOCYTES NFR BLD AUTO: 0.2 %
LYMPHOCYTES # BLD AUTO: 0.8 K/UL
LYMPHOCYTES NFR BLD AUTO: 15.1 %
MAN DIFF?: NORMAL
MCHC RBC-ENTMCNC: 28.9 PG
MCHC RBC-ENTMCNC: 31.6 GM/DL
MCV RBC AUTO: 91.4 FL
MONOCYTES # BLD AUTO: 0.34 K/UL
MONOCYTES NFR BLD AUTO: 6.4 %
NEUTROPHILS # BLD AUTO: 3.88 K/UL
NEUTROPHILS NFR BLD AUTO: 73 %
PLATELET # BLD AUTO: 112 K/UL
POTASSIUM SERPL-SCNC: 4.2 MMOL/L
PROT SERPL-MCNC: 6.5 G/DL
RBC # BLD: 4.88 M/UL
RBC # FLD: 13.2 %
SODIUM SERPL-SCNC: 138 MMOL/L
WBC # FLD AUTO: 5.31 K/UL

## 2019-04-06 ENCOUNTER — CHART COPY (OUTPATIENT)
Age: 74
End: 2019-04-06

## 2019-04-08 LAB — INR PPP: 3.7

## 2019-04-11 ENCOUNTER — RX RENEWAL (OUTPATIENT)
Age: 74
End: 2019-04-11

## 2019-04-29 LAB — INR PPP: 2.1

## 2019-05-02 ENCOUNTER — APPOINTMENT (OUTPATIENT)
Dept: INTERNAL MEDICINE | Facility: CLINIC | Age: 74
End: 2019-05-02
Payer: MEDICARE

## 2019-05-02 VITALS
DIASTOLIC BLOOD PRESSURE: 60 MMHG | TEMPERATURE: 97.5 F | RESPIRATION RATE: 14 BRPM | HEIGHT: 68 IN | HEART RATE: 74 BPM | BODY MASS INDEX: 23.04 KG/M2 | OXYGEN SATURATION: 98 % | WEIGHT: 152 LBS | SYSTOLIC BLOOD PRESSURE: 110 MMHG

## 2019-05-02 DIAGNOSIS — J01.90 ACUTE SINUSITIS, UNSPECIFIED: ICD-10-CM

## 2019-05-02 PROCEDURE — 99214 OFFICE O/P EST MOD 30 MIN: CPT

## 2019-05-02 NOTE — HISTORY OF PRESENT ILLNESS
[Initial Evaluation] : an initial evaluation of [Nasal Congestion] : nasal congestion [Currently Experiencing] : The patient is currently experiencing symptoms. [Gradual] : gradual [___ Week(s) Ago] : [unfilled] week(s) ago [Frequent] : frequently [Daily] : occur daily [Moderate] : moderate in severity [Worsening] : worsening [Oral Antihistamine] : oral antihistamine [Chills] : chills [Cough] : cough [Fever] : no fever [Sore Throat] : no sore throat

## 2019-05-02 NOTE — REVIEW OF SYSTEMS
[Chills] : chills [Nasal Discharge] : nasal discharge [Cough] : cough [Fever] : no fever [Night Sweats] : no night sweats [Sore Throat] : no sore throat [Chest Pain] : no chest pain [Palpitations] : no palpitations [Lower Ext Edema] : no lower extremity edema [Shortness Of Breath] : no shortness of breath [Wheezing] : no wheezing [Dyspnea on Exertion] : not dyspnea on exertion [Abdominal Pain] : no abdominal pain [Nausea] : no nausea [Constipation] : no constipation [Diarrhea] : no diarrhea [Vomiting] : no vomiting [Dysuria] : no dysuria

## 2019-05-02 NOTE — PHYSICAL EXAM
[No Acute Distress] : no acute distress [Normal Sclera/Conjunctiva] : normal sclera/conjunctiva [PERRL] : pupils equal round and reactive to light [EOMI] : extraocular movements intact [No Respiratory Distress] : no respiratory distress  [Clear to Auscultation] : lungs were clear to auscultation bilaterally [No Accessory Muscle Use] : no accessory muscle use [Normal Rate] : normal rate  [Regular Rhythm] : with a regular rhythm [Soft] : abdomen soft [No Murmur] : no murmur heard [Normal S1, S2] : normal S1 and S2 [Non Tender] : non-tender [Normal Bowel Sounds] : normal bowel sounds [de-identified] : PND

## 2019-05-02 NOTE — ASSESSMENT
[FreeTextEntry1] : Rhinosinusitis: Symptoms worsening. Start ceftin BID. Follow-up 1 week if symptoms not improved. \par

## 2019-05-20 LAB — INR PPP: 2.5

## 2019-06-02 LAB — INR PPP: 2.5

## 2019-06-10 ENCOUNTER — APPOINTMENT (OUTPATIENT)
Dept: INTERNAL MEDICINE | Facility: CLINIC | Age: 74
End: 2019-06-10
Payer: MEDICARE

## 2019-06-10 VITALS
WEIGHT: 154 LBS | OXYGEN SATURATION: 98 % | BODY MASS INDEX: 23.42 KG/M2 | SYSTOLIC BLOOD PRESSURE: 160 MMHG | TEMPERATURE: 97.5 F | RESPIRATION RATE: 14 BRPM | HEART RATE: 71 BPM | DIASTOLIC BLOOD PRESSURE: 80 MMHG

## 2019-06-10 VITALS — DIASTOLIC BLOOD PRESSURE: 70 MMHG | SYSTOLIC BLOOD PRESSURE: 138 MMHG

## 2019-06-10 DIAGNOSIS — L08.9 LOCAL INFECTION OF THE SKIN AND SUBCUTANEOUS TISSUE, UNSPECIFIED: ICD-10-CM

## 2019-06-10 PROCEDURE — 99214 OFFICE O/P EST MOD 30 MIN: CPT

## 2019-06-10 NOTE — PHYSICAL EXAM
[No Acute Distress] : no acute distress [Well-Appearing] : well-appearing [Well Nourished] : well nourished [Well Developed] : well developed [Normal Sclera/Conjunctiva] : normal sclera/conjunctiva [PERRL] : pupils equal round and reactive to light [EOMI] : extraocular movements intact [Normal Outer Ear/Nose] : the outer ears and nose were normal in appearance [Normal Oropharynx] : the oropharynx was normal [Supple] : supple [No Lymphadenopathy] : no lymphadenopathy [No JVD] : no jugular venous distention [No Respiratory Distress] : no respiratory distress  [Thyroid Normal, No Nodules] : the thyroid was normal and there were no nodules present [Clear to Auscultation] : lungs were clear to auscultation bilaterally [No Accessory Muscle Use] : no accessory muscle use [Normal Rate] : normal rate  [Regular Rhythm] : with a regular rhythm [Normal S1, S2] : normal S1 and S2 [No Murmur] : no murmur heard [No Carotid Bruits] : no carotid bruits [No Abdominal Bruit] : a ~M bruit was not heard ~T in the abdomen [No Varicosities] : no varicosities [Pedal Pulses Present] : the pedal pulses are present [No Edema] : there was no peripheral edema [No Extremity Clubbing/Cyanosis] : no extremity clubbing/cyanosis [No Palpable Aorta] : no palpable aorta [Soft] : abdomen soft [Non Tender] : non-tender [Non-distended] : non-distended [No Masses] : no abdominal mass palpated [No HSM] : no HSM [Normal Bowel Sounds] : normal bowel sounds [Normal Anterior Cervical Nodes] : no anterior cervical lymphadenopathy [Normal Posterior Cervical Nodes] : no posterior cervical lymphadenopathy [No CVA Tenderness] : no CVA  tenderness [No Spinal Tenderness] : no spinal tenderness [No Joint Swelling] : no joint swelling [Grossly Normal Strength/Tone] : grossly normal strength/tone [No Rash] : no rash [Coordination Grossly Intact] : coordination grossly intact [Normal Gait] : normal gait [No Focal Deficits] : no focal deficits [Deep Tendon Reflexes (DTR)] : deep tendon reflexes were 2+ and symmetric [Normal Insight/Judgement] : insight and judgment were intact [Normal Affect] : the affect was normal [de-identified] : Large abrasion left shin; small abrasion right knee; moderate abrasion left deltoid region

## 2019-06-10 NOTE — HISTORY OF PRESENT ILLNESS
[FreeTextEntry8] : Pt ripped and fell while hiking 3 days ago. He has abrasions on both shins and on his left deltoid region.

## 2019-06-13 ENCOUNTER — APPOINTMENT (OUTPATIENT)
Dept: INTERNAL MEDICINE | Facility: CLINIC | Age: 74
End: 2019-06-13
Payer: MEDICARE

## 2019-06-13 ENCOUNTER — INPATIENT (INPATIENT)
Facility: HOSPITAL | Age: 74
LOS: 3 days | Discharge: HOME | DRG: 603 | End: 2019-06-17
Attending: FAMILY MEDICINE | Admitting: FAMILY MEDICINE
Payer: MEDICARE

## 2019-06-13 VITALS
TEMPERATURE: 98 F | RESPIRATION RATE: 18 BRPM | SYSTOLIC BLOOD PRESSURE: 178 MMHG | HEART RATE: 69 BPM | WEIGHT: 149.91 LBS | DIASTOLIC BLOOD PRESSURE: 82 MMHG | OXYGEN SATURATION: 99 %

## 2019-06-13 VITALS
RESPIRATION RATE: 14 BRPM | DIASTOLIC BLOOD PRESSURE: 74 MMHG | HEART RATE: 74 BPM | SYSTOLIC BLOOD PRESSURE: 170 MMHG | HEIGHT: 68 IN | OXYGEN SATURATION: 96 % | BODY MASS INDEX: 23.19 KG/M2 | WEIGHT: 153 LBS | TEMPERATURE: 97.6 F

## 2019-06-13 DIAGNOSIS — L03.116 CELLULITIS OF LEFT LOWER LIMB: ICD-10-CM

## 2019-06-13 DIAGNOSIS — E11.628 TYPE 2 DIABETES MELLITUS WITH OTHER SKIN COMPLICATIONS: ICD-10-CM

## 2019-06-13 DIAGNOSIS — G20 PARKINSON'S DISEASE: ICD-10-CM

## 2019-06-13 DIAGNOSIS — Z29.9 ENCOUNTER FOR PROPHYLACTIC MEASURES, UNSPECIFIED: ICD-10-CM

## 2019-06-13 DIAGNOSIS — I50.32 CHRONIC DIASTOLIC (CONGESTIVE) HEART FAILURE: ICD-10-CM

## 2019-06-13 DIAGNOSIS — L03.90 CELLULITIS, UNSPECIFIED: ICD-10-CM

## 2019-06-13 DIAGNOSIS — Z95.2 PRESENCE OF PROSTHETIC HEART VALVE: Chronic | ICD-10-CM

## 2019-06-13 DIAGNOSIS — Z95.1 PRESENCE OF AORTOCORONARY BYPASS GRAFT: ICD-10-CM

## 2019-06-13 DIAGNOSIS — Z95.2 PRESENCE OF PROSTHETIC HEART VALVE: ICD-10-CM

## 2019-06-13 DIAGNOSIS — Z95.1 PRESENCE OF AORTOCORONARY BYPASS GRAFT: Chronic | ICD-10-CM

## 2019-06-13 LAB
ALBUMIN SERPL ELPH-MCNC: 4.3 G/DL — SIGNIFICANT CHANGE UP (ref 3.3–5)
ALP SERPL-CCNC: 94 U/L — SIGNIFICANT CHANGE UP (ref 40–120)
ALT FLD-CCNC: 32 U/L — SIGNIFICANT CHANGE UP (ref 12–78)
ANION GAP SERPL CALC-SCNC: 5 MMOL/L — SIGNIFICANT CHANGE UP (ref 5–17)
APTT BLD: 50.4 SEC — HIGH (ref 27.5–36.3)
AST SERPL-CCNC: 21 U/L — SIGNIFICANT CHANGE UP (ref 15–37)
BASOPHILS # BLD AUTO: 0.02 K/UL — SIGNIFICANT CHANGE UP (ref 0–0.2)
BASOPHILS NFR BLD AUTO: 0.3 % — SIGNIFICANT CHANGE UP (ref 0–2)
BILIRUB SERPL-MCNC: 0.9 MG/DL — SIGNIFICANT CHANGE UP (ref 0.2–1.2)
BUN SERPL-MCNC: 10 MG/DL — SIGNIFICANT CHANGE UP (ref 7–23)
CALCIUM SERPL-MCNC: 9.2 MG/DL — SIGNIFICANT CHANGE UP (ref 8.5–10.1)
CHLORIDE SERPL-SCNC: 102 MMOL/L — SIGNIFICANT CHANGE UP (ref 96–108)
CO2 SERPL-SCNC: 29 MMOL/L — SIGNIFICANT CHANGE UP (ref 22–31)
CREAT SERPL-MCNC: 0.85 MG/DL — SIGNIFICANT CHANGE UP (ref 0.5–1.3)
CRP SERPL-MCNC: 0.81 MG/DL — HIGH (ref 0–0.4)
EOSINOPHIL # BLD AUTO: 0.09 K/UL — SIGNIFICANT CHANGE UP (ref 0–0.5)
EOSINOPHIL NFR BLD AUTO: 1.5 % — SIGNIFICANT CHANGE UP (ref 0–6)
GLUCOSE SERPL-MCNC: 196 MG/DL — HIGH (ref 70–99)
HCT VFR BLD CALC: 42.7 % — SIGNIFICANT CHANGE UP (ref 39–50)
HGB BLD-MCNC: 14.1 G/DL — SIGNIFICANT CHANGE UP (ref 13–17)
IMM GRANULOCYTES NFR BLD AUTO: 0.5 % — SIGNIFICANT CHANGE UP (ref 0–1.5)
INR BLD: 2.8 RATIO — HIGH (ref 0.88–1.16)
LACTATE SERPL-SCNC: 1.2 MMOL/L — SIGNIFICANT CHANGE UP (ref 0.7–2)
LYMPHOCYTES # BLD AUTO: 0.96 K/UL — LOW (ref 1–3.3)
LYMPHOCYTES # BLD AUTO: 15.7 % — SIGNIFICANT CHANGE UP (ref 13–44)
MCHC RBC-ENTMCNC: 28.4 PG — SIGNIFICANT CHANGE UP (ref 27–34)
MCHC RBC-ENTMCNC: 33 GM/DL — SIGNIFICANT CHANGE UP (ref 32–36)
MCV RBC AUTO: 85.9 FL — SIGNIFICANT CHANGE UP (ref 80–100)
MONOCYTES # BLD AUTO: 0.37 K/UL — SIGNIFICANT CHANGE UP (ref 0–0.9)
MONOCYTES NFR BLD AUTO: 6.1 % — SIGNIFICANT CHANGE UP (ref 2–14)
NEUTROPHILS # BLD AUTO: 4.63 K/UL — SIGNIFICANT CHANGE UP (ref 1.8–7.4)
NEUTROPHILS NFR BLD AUTO: 75.9 % — SIGNIFICANT CHANGE UP (ref 43–77)
NRBC # BLD: 0 /100 WBCS — SIGNIFICANT CHANGE UP (ref 0–0)
PLATELET # BLD AUTO: 134 K/UL — LOW (ref 150–400)
POTASSIUM SERPL-MCNC: 4.1 MMOL/L — SIGNIFICANT CHANGE UP (ref 3.5–5.3)
POTASSIUM SERPL-SCNC: 4.1 MMOL/L — SIGNIFICANT CHANGE UP (ref 3.5–5.3)
PROT SERPL-MCNC: 7.4 G/DL — SIGNIFICANT CHANGE UP (ref 6–8.3)
PROTHROM AB SERPL-ACNC: 32.7 SEC — HIGH (ref 10–12.9)
RBC # BLD: 4.97 M/UL — SIGNIFICANT CHANGE UP (ref 4.2–5.8)
RBC # FLD: 13.7 % — SIGNIFICANT CHANGE UP (ref 10.3–14.5)
SODIUM SERPL-SCNC: 136 MMOL/L — SIGNIFICANT CHANGE UP (ref 135–145)
WBC # BLD: 6.1 K/UL — SIGNIFICANT CHANGE UP (ref 3.8–10.5)
WBC # FLD AUTO: 6.1 K/UL — SIGNIFICANT CHANGE UP (ref 3.8–10.5)

## 2019-06-13 PROCEDURE — 99214 OFFICE O/P EST MOD 30 MIN: CPT

## 2019-06-13 PROCEDURE — 93010 ELECTROCARDIOGRAM REPORT: CPT

## 2019-06-13 PROCEDURE — 99223 1ST HOSP IP/OBS HIGH 75: CPT | Mod: AI

## 2019-06-13 PROCEDURE — 71045 X-RAY EXAM CHEST 1 VIEW: CPT | Mod: 26

## 2019-06-13 PROCEDURE — 93971 EXTREMITY STUDY: CPT | Mod: 26,LT

## 2019-06-13 PROCEDURE — 99222 1ST HOSP IP/OBS MODERATE 55: CPT

## 2019-06-13 PROCEDURE — 99284 EMERGENCY DEPT VISIT MOD MDM: CPT

## 2019-06-13 RX ORDER — RASAGILINE 0.5 MG/1
0 TABLET ORAL
Qty: 0 | Refills: 0 | DISCHARGE

## 2019-06-13 RX ORDER — WARFARIN SODIUM 2.5 MG/1
0 TABLET ORAL
Qty: 0 | Refills: 0 | DISCHARGE

## 2019-06-13 RX ORDER — DEXTROSE 50 % IN WATER 50 %
25 SYRINGE (ML) INTRAVENOUS ONCE
Refills: 0 | Status: DISCONTINUED | OUTPATIENT
Start: 2019-06-13 | End: 2019-06-17

## 2019-06-13 RX ORDER — RASAGILINE 0.5 MG/1
1 TABLET ORAL DAILY
Refills: 0 | Status: DISCONTINUED | OUTPATIENT
Start: 2019-06-13 | End: 2019-06-17

## 2019-06-13 RX ORDER — TETANUS TOXOID, REDUCED DIPHTHERIA TOXOID AND ACELLULAR PERTUSSIS VACCINE, ADSORBED 5; 2.5; 8; 8; 2.5 [IU]/.5ML; [IU]/.5ML; UG/.5ML; UG/.5ML; UG/.5ML
0.5 SUSPENSION INTRAMUSCULAR ONCE
Refills: 0 | Status: DISCONTINUED | OUTPATIENT
Start: 2019-06-13 | End: 2019-06-14

## 2019-06-13 RX ORDER — INSULIN GLARGINE 100 [IU]/ML
20 INJECTION, SOLUTION SUBCUTANEOUS AT BEDTIME
Refills: 0 | Status: DISCONTINUED | OUTPATIENT
Start: 2019-06-13 | End: 2019-06-17

## 2019-06-13 RX ORDER — ACETAMINOPHEN 500 MG
650 TABLET ORAL EVERY 6 HOURS
Refills: 0 | Status: DISCONTINUED | OUTPATIENT
Start: 2019-06-13 | End: 2019-06-17

## 2019-06-13 RX ORDER — CARBIDOPA AND LEVODOPA 25; 100 MG/1; MG/1
0 TABLET ORAL
Qty: 0 | Refills: 0 | DISCHARGE

## 2019-06-13 RX ORDER — GLUCAGON INJECTION, SOLUTION 0.5 MG/.1ML
1 INJECTION, SOLUTION SUBCUTANEOUS ONCE
Refills: 0 | Status: DISCONTINUED | OUTPATIENT
Start: 2019-06-13 | End: 2019-06-17

## 2019-06-13 RX ORDER — DEXTROSE 50 % IN WATER 50 %
12.5 SYRINGE (ML) INTRAVENOUS ONCE
Refills: 0 | Status: DISCONTINUED | OUTPATIENT
Start: 2019-06-13 | End: 2019-06-17

## 2019-06-13 RX ORDER — PRAMIPEXOLE DIHYDROCHLORIDE 0.12 MG/1
3 TABLET ORAL
Refills: 0 | Status: DISCONTINUED | OUTPATIENT
Start: 2019-06-13 | End: 2019-06-17

## 2019-06-13 RX ORDER — DEXTROSE 50 % IN WATER 50 %
15 SYRINGE (ML) INTRAVENOUS ONCE
Refills: 0 | Status: DISCONTINUED | OUTPATIENT
Start: 2019-06-13 | End: 2019-06-17

## 2019-06-13 RX ORDER — ATORVASTATIN CALCIUM 80 MG/1
0 TABLET, FILM COATED ORAL
Qty: 0 | Refills: 0 | DISCHARGE

## 2019-06-13 RX ORDER — INSULIN GLARGINE 100 [IU]/ML
30 INJECTION, SOLUTION SUBCUTANEOUS
Qty: 0 | Refills: 0 | DISCHARGE

## 2019-06-13 RX ORDER — INSULIN LISPRO 100/ML
VIAL (ML) SUBCUTANEOUS
Refills: 0 | Status: DISCONTINUED | OUTPATIENT
Start: 2019-06-13 | End: 2019-06-17

## 2019-06-13 RX ORDER — PRAMIPEXOLE DIHYDROCHLORIDE 0.12 MG/1
0 TABLET ORAL
Qty: 0 | Refills: 0 | DISCHARGE

## 2019-06-13 RX ORDER — AMLODIPINE BESYLATE 2.5 MG/1
5 TABLET ORAL DAILY
Refills: 0 | Status: DISCONTINUED | OUTPATIENT
Start: 2019-06-14 | End: 2019-06-17

## 2019-06-13 RX ORDER — CEFAZOLIN SODIUM 1 G
VIAL (EA) INJECTION
Refills: 0 | Status: DISCONTINUED | OUTPATIENT
Start: 2019-06-13 | End: 2019-06-15

## 2019-06-13 RX ORDER — ATORVASTATIN CALCIUM 80 MG/1
40 TABLET, FILM COATED ORAL AT BEDTIME
Refills: 0 | Status: DISCONTINUED | OUTPATIENT
Start: 2019-06-13 | End: 2019-06-17

## 2019-06-13 RX ORDER — ASPIRIN/CALCIUM CARB/MAGNESIUM 324 MG
0 TABLET ORAL
Qty: 0 | Refills: 0 | DISCHARGE

## 2019-06-13 RX ORDER — METOPROLOL TARTRATE 50 MG
0 TABLET ORAL
Qty: 0 | Refills: 0 | DISCHARGE

## 2019-06-13 RX ORDER — CEFAZOLIN SODIUM 1 G
1000 VIAL (EA) INJECTION EVERY 8 HOURS
Refills: 0 | Status: DISCONTINUED | OUTPATIENT
Start: 2019-06-13 | End: 2019-06-15

## 2019-06-13 RX ORDER — METOPROLOL TARTRATE 50 MG
50 TABLET ORAL DAILY
Refills: 0 | Status: DISCONTINUED | OUTPATIENT
Start: 2019-06-14 | End: 2019-06-17

## 2019-06-13 RX ORDER — INSULIN LISPRO 100/ML
VIAL (ML) SUBCUTANEOUS AT BEDTIME
Refills: 0 | Status: DISCONTINUED | OUTPATIENT
Start: 2019-06-13 | End: 2019-06-17

## 2019-06-13 RX ORDER — CARBIDOPA AND LEVODOPA 25; 100 MG/1; MG/1
2 TABLET ORAL THREE TIMES A DAY
Refills: 0 | Status: DISCONTINUED | OUTPATIENT
Start: 2019-06-13 | End: 2019-06-17

## 2019-06-13 RX ORDER — WARFARIN SODIUM 2.5 MG/1
4 TABLET ORAL ONCE
Refills: 0 | Status: COMPLETED | OUTPATIENT
Start: 2019-06-13 | End: 2019-06-13

## 2019-06-13 RX ORDER — SODIUM CHLORIDE 9 MG/ML
1000 INJECTION, SOLUTION INTRAVENOUS
Refills: 0 | Status: DISCONTINUED | OUTPATIENT
Start: 2019-06-13 | End: 2019-06-17

## 2019-06-13 RX ORDER — CEFAZOLIN SODIUM 1 G
1000 VIAL (EA) INJECTION ONCE
Refills: 0 | Status: COMPLETED | OUTPATIENT
Start: 2019-06-13 | End: 2019-06-13

## 2019-06-13 RX ORDER — LISINOPRIL 2.5 MG/1
20 TABLET ORAL DAILY
Refills: 0 | Status: DISCONTINUED | OUTPATIENT
Start: 2019-06-14 | End: 2019-06-17

## 2019-06-13 RX ORDER — INSULIN GLARGINE 100 [IU]/ML
0 INJECTION, SOLUTION SUBCUTANEOUS
Qty: 0 | Refills: 0 | DISCHARGE

## 2019-06-13 RX ORDER — LACTOBACILLUS ACIDOPHILUS 100MM CELL
1 CAPSULE ORAL
Refills: 0 | Status: DISCONTINUED | OUTPATIENT
Start: 2019-06-13 | End: 2019-06-17

## 2019-06-13 RX ADMIN — CARBIDOPA AND LEVODOPA 2 TABLET(S): 25; 100 TABLET ORAL at 21:44

## 2019-06-13 RX ADMIN — Medication 2: at 17:47

## 2019-06-13 RX ADMIN — Medication 100 MILLIGRAM(S): at 09:35

## 2019-06-13 RX ADMIN — Medication 2: at 13:57

## 2019-06-13 RX ADMIN — Medication 1 TABLET(S): at 17:50

## 2019-06-13 RX ADMIN — WARFARIN SODIUM 4 MILLIGRAM(S): 2.5 TABLET ORAL at 21:44

## 2019-06-13 RX ADMIN — INSULIN GLARGINE 20 UNIT(S): 100 INJECTION, SOLUTION SUBCUTANEOUS at 21:44

## 2019-06-13 RX ADMIN — Medication 600 MILLIGRAM(S): at 10:05

## 2019-06-13 RX ADMIN — CARBIDOPA AND LEVODOPA 2 TABLET(S): 25; 100 TABLET ORAL at 14:11

## 2019-06-13 RX ADMIN — Medication 1 TABLET(S): at 14:11

## 2019-06-13 RX ADMIN — Medication 100 MILLIGRAM(S): at 14:11

## 2019-06-13 RX ADMIN — ATORVASTATIN CALCIUM 40 MILLIGRAM(S): 80 TABLET, FILM COATED ORAL at 21:44

## 2019-06-13 RX ADMIN — Medication 100 MILLIGRAM(S): at 21:44

## 2019-06-13 RX ADMIN — PRAMIPEXOLE DIHYDROCHLORIDE 3 MILLIGRAM(S): 0.12 TABLET ORAL at 17:50

## 2019-06-13 NOTE — ASSESSMENT
[FreeTextEntry1] : DM\par good control\par he will return for fasting labs\par \par cellulitis\par not improved on oral antibiotics\par will send to ER at [place] - I called and notified\par \par hyperlipidemia\par on atorvastatin\par \par HTN\par will have rechecked in ER

## 2019-06-13 NOTE — CONSULT NOTE ADULT - SUBJECTIVE AND OBJECTIVE BOX
[facility], Division of Infectious Diseases  CRISTELA Rodriguez A. Lee  [phone]    LANETTE HOLM  74y, Male  [id number]      HPI:  75yo M w/ PMHx mechanical AVR 2004 (for bicuspid aortic valve) on warfarin w/ INR goal 2.5-3.5, grade II diastolic dysfunction (last EF 70%), CABG (circumflex) in 2004, HTN, HLD, DM2 on insulin, carotid artery plaque, parkinson's, osteopenia, thrombocytopenia presents w/ redness and pain on LLE for the past 4-5 days. Pt states he was walking in the woods behind his house in [place], [place] when he tripped over a loose stick and fell onto the ground.  He suffered numerous scrapes on his skin but mostly localized to the LLE anterior shin. He states that he applied a topical OTC abx but symptoms worsened with scrapes and erythema surround the scabs.  He saw his PMD on 6/10/19 who recommended cefuroxime which the pt had been taking but symptoms worsened. Edema worsened, redness worsened. Doesnt hurt but feels pain when he puts pressure on his lle.  No fevers, no chills.        PMH/PSH--  Mechanical heart valve present  DM (diabetes mellitus)  Parkinson disease  HTN (hypertension)  S/P CABG x 1  H/O aortic valve replacement      Allergies--NKDA      Medications--  Antibiotics: ceFAZolin   IVPB 1000 milliGRAM(s) IV Intermittent every 8 hours  ceFAZolin   IVPB        Immunologic: diphtheria/tetanus/pertussis (acellular) Vaccine (BOOSTRIX) 0.5 milliLiter(s) IntraMuscular once    Other: acetaminophen   Tablet .. PRN  atorvastatin  carbidopa/levodopa  25/100  dextrose 40% Gel PRN  dextrose 5%.  dextrose 50% Injectable  dextrose 50% Injectable  dextrose 50% Injectable  glucagon  Injectable PRN  insulin glargine Injectable (LANTUS)  insulin lispro (HumaLOG) corrective regimen sliding scale  insulin lispro (HumaLOG) corrective regimen sliding scale  lactobacillus acidophilus  pramipexole  rasagiline Tablet  warfarin      Social History--  EtOH: denies ***  Tobacco: denies ***  Drug Use: denies ***    Family/Marital History--  FH: myocardial infarction  Family history of diabetes mellitus in mother      Remainder not relevant to clinical concern.    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     Pertinent positives and negatives--  Constitutional: No fevers. No Chills. No Rigors.   Eyes: no blurry vision  ENMT: no sore throat  Cardiovascular: No chest pain. No palpitations.  Respiratory: No shortness of breath. No cough.  Gastrointestinal: No nausea or vomiting. No diarrhea or constipation.   Genitourinary: no dysuria  Musculoskeletal: no myalgia  Skin: ++ lle bruise  Neurologic: no headache  Psychiatric: no depression    Review of systems otherwise negative except as previously noted.    Physical Exam--  Vital Signs: T(F): 98.2 (06-13-19 @ 08:48), Max: 98.2 (06-13-19 @ 08:48)  HR: 69 (06-13-19 @ 08:48)  BP: 178/82 (06-13-19 @ 08:48)  RR: 18 (06-13-19 @ 08:48)  SpO2: 99% (06-13-19 @ 08:48)  Wt(kg): --  General: Nontoxic-appearing Male in no acute distress.  HEENT: AT/NCAnicteric. Conjunctiva pink and moist. Oropharynx clear. Dentition fair.  Neck: Not rigid. No sense of mass.  Nodes: None palpable.  Lungs: Clear bilaterally without rales, wheezing or rhonchi  Heart: Regular rate and rhythm. +murmur  Abdomen: Bowel sounds present and normoactive. Soft. Nondistended. Nontender.   Extremities: No cyanosis or clubbing. No edema. lle 2 areas of scabbing, with surrounding erythema, mild edema  nonpurulent, nontender.  Skin: Warm. Dry. Good turgor. No rash. No vasculitic stigmata.  Psychiatric: Appropriate affect and mood for situation.         Laboratory & Imaging Data--  CBC                        14.1   6.10  )-----------( 134      ( 13 Jun 2019 09:33 )             42.7       Chemistries  06-13    136  |  102  |  10  ----------------------------<  196<H>  4.1   |  29  |  0.85    Ca    9.2      13 Jun 2019 09:33    TPro  7.4  /  Alb  4.3  /  TBili  0.9  /  DBili  x   /  AST  21  /  ALT  32  /  AlkPhos  94  06-13      Culture Data  < from: Xray Chest 1 View- PORTABLE-Urgent (06.13.19 @ 12:56) >    EXAM:  XR CHEST PORTABLE URGENT 1V                            PROCEDURE DATE:  06/13/2019          INTERPRETATION:  Screening admission.    AP chest.    Status post median sternotomy cardiac valve surgery. Normal heart size.   Atherosclerotic aorta. No pleural-parenchymal abnormality.    Impression:    No active disease.          < end of copied text >

## 2019-06-13 NOTE — ED PROVIDER NOTE - NSCAREINITIATED _GEN_ER
Called [phone] as requested by Pt and Pt's wife to start the process of getting pt a Speech Machine. Company is called [employer]. Spoke with Lauren in Customer service, who confirmed they carry speech machines and that we would need to contact our local rep. Lauren says our local rep is Nicole Steele and his phone number is [phone].     Called Nicole at [phone] and left a message with my direct call back number.   Scott Reyes(Attending)

## 2019-06-13 NOTE — ED PROVIDER NOTE - CLINICAL SUMMARY MEDICAL DECISION MAKING FREE TEXT BOX
Pt. with worsening wound to left lower leg. Pt. not improving on PO ABX. Would require doses of IV ABX.

## 2019-06-13 NOTE — PHYSICAL EXAM
[No Acute Distress] : no acute distress [Well Nourished] : well nourished [No Respiratory Distress] : no respiratory distress  [Clear to Auscultation] : lungs were clear to auscultation bilaterally [Normal Rate] : normal rate  [Normal Affect] : the affect was normal [Normal Insight/Judgement] : insight and judgment were intact [de-identified] : edema left ankle and foot [de-identified] : erythema left shin with large scabbed abrasions. left shoulder with healing abrasion

## 2019-06-13 NOTE — H&P ADULT - NSHPSOCIALHISTORY_GEN_ALL_CORE
Social Hx:  - etoh: socially, no h/o abuse  - tobacco: never  - recreational drug use: never  - ambulates independently without assistive devices. lives w/ spouse who is alive/well

## 2019-06-13 NOTE — H&P ADULT - NSICDXPASTMEDICALHX_GEN_ALL_CORE_FT
PAST MEDICAL HISTORY:  DM (diabetes mellitus)     HTN (hypertension)     Mechanical heart valve present     Parkinson disease

## 2019-06-13 NOTE — CONSULT NOTE ADULT - PROBLEM SELECTOR RECOMMENDATION 9
f/u blood cx  nonpurulent  appears more reactive to trauma  afeb, no leucocytosis, nontoxic    but pt with comorbidities     would check doppler  keep elevated  local care    cont ancef

## 2019-06-13 NOTE — CONSULT NOTE ADULT - SUBJECTIVE AND OBJECTIVE BOX
[facility]         Pj Rodriguez, Rosalia Galindo, Isaiah, Hansa Fulton        [phone] (office)    Reason for Consult: mechanical aortic valve      HPI: Patient is a 73 yo male with pmhx of bicuspid aortic stenosis s/p mechanical aortic valve in 2004, CAD s/p CABG of circumflex artery, DM, HTN, and Parkinsons disease, presented to ED with LLE cellulitis that failed outpatient abx therapy.       PAST MEDICAL & SURGICAL HISTORY:  Mechanical heart valve present  CABG  DM (diabetes mellitus)  Parkinson disease  HTN (hypertension)      SOCIAL HISTORY: No active tobacco, alcohol or illicit drug use    FAMILY HISTORY:      Home Medications:  amlodipine-benazepril 5 mg-20 mg oral capsule: 1 cap(s) orally once a day (13 Jun 2019 12:02)  atorvastatin 40 mg oral tablet: 1 tab(s) orally once a day (13 Jun 2019 12:04)  Coumadin: pt takes 5 to 7.5mg q daily pending INR findings (monitors INR&#x27;s at home) (13 Jun 2019 12:06)  HumaLOG:  (13 Jun 2019 10:53)  Lantus:  (13 Jun 2019 10:53)  metoprolol succinate 50 mg oral tablet, extended release: 1 tab(s) orally once a day (13 Jun 2019 12:05)  pramipexole 0.75 mg oral tablet, extended release: 4 tab(s) orally 2 times a day (13 Jun 2019 12:11)  rasagiline 1 mg oral tablet: 1 tab(s) orally once a day (13 Jun 2019 12:11)  Sinemet:  (13 Jun 2019 10:53)      MEDICATIONS  (STANDING):    MEDICATIONS  (PRN):      Allergies    No Known Allergies    Intolerances        REVIEW OF SYSTEMS: Negative except as per HPI.    VITAL SIGNS:   Vital Signs Last 24 Hrs  T(C): 36.8 (13 Jun 2019 08:48), Max: 36.8 (13 Jun 2019 08:48)  T(F): 98.2 (13 Jun 2019 08:48), Max: 98.2 (13 Jun 2019 08:48)  HR: 69 (13 Jun 2019 08:48) (69 - 69)  BP: 178/82 (13 Jun 2019 08:48) (178/82 - 178/82)  BP(mean): --  RR: 18 (13 Jun 2019 08:48) (18 - 18)  SpO2: 99% (13 Jun 2019 08:48) (99% - 99%)    I&O's Summary      PHYSICAL EXAM:  Constitutional: NAD, well-developed  HEENT NC/AT, moist mucous membranes  Pulmonary: Non-labored, breath sounds are clear bilaterally, no wheezing, rales or rhonchi  Cardiovascular: +S1, S2, RRR, no murmur  Gastrointestinal: Soft, nontender, nondistended, normoactive bowel sounds  Extremities: No peripheral edema   Neurological: Alert, strength and sensitivity are grossly intact  Skin: No obvious lesions/rashes  Psych: Mood & affect appropriate    LABS: All Labs Reviewed:                        14.1   6.10  )-----------( 134      ( 13 Jun 2019 09:33 )             42.7     13 Jun 2019 09:33    136    |  102    |  10     ----------------------------<  196    4.1     |  29     |  0.85     Ca    9.2        13 Jun 2019 09:33    TPro  7.4    /  Alb  4.3    /  TBili  0.9    /  DBili  x      /  AST  21     /  ALT  32     /  AlkPhos  94     13 Jun 2019 09:33    PT/INR - ( 13 Jun 2019 09:33 )   PT: 32.7 sec;   INR: 2.80 ratio         PTT - ( 13 Jun 2019 09:33 )  PTT:50.4 sec      Blood Culture:         EKG:    RADIOLOGY:    CXR: [facility]         Pj Rodriguez, Josie, Rosalia, Isaiah, Hansa Fulton        [phone] (office)    Reason for Consult: mechanical aortic valve      HPI: Patient is a 75 yo male with pmhx of bicuspid aortic stenosis s/p mechanical aortic valve in 2004, CAD s/p CABG of circumflex artery, DM, HTN, and Parkinsons disease, presented to ED with LLE cellulitis that failed outpatient abx therapy. Patient seen and examined at bedside, complaining of LLE discomfort, otherwise feeling well. Denies any chest pain, palpitations, SOB, RESENDIZ, or dizziness. Last seen Dr. Rodriguez in office in 1/2019.       PAST MEDICAL & SURGICAL HISTORY:  Mechanical heart valve present  CABG  DM (diabetes mellitus)  Parkinson disease  HTN (hypertension)      SOCIAL HISTORY: No active tobacco, alcohol or illicit drug use    FAMILY HISTORY:  Family hx of DM  Family hx of CVA    Home Medications:  amlodipine-benazepril 5 mg-20 mg oral capsule: 1 cap(s) orally once a day (13 Jun 2019 12:20)  atorvastatin 40 mg oral tablet: 1 tab(s) orally once a day (13 Jun 2019 12:20)  Coumadin: pt takes 5 to 7.5mg q daily pending INR findings (monitors INR&#x27;s at home) (13 Jun 2019 12:20)  HumaLOG:  (13 Jun 2019 12:19)  Lantus: 30  subcutaneous once a day (at bedtime) (13 Jun 2019 12:20)  metoprolol succinate 50 mg oral tablet, extended release: 1 tab(s) orally once a day (13 Jun 2019 12:20)  pramipexole 0.75 mg oral tablet, extended release: 4 tab(s) orally 2 times a day (13 Jun 2019 12:20)  rasagiline 1 mg oral tablet: 1 tab(s) orally once a day (13 Jun 2019 12:20)  Sinemet 25 mg-100 mg oral tablet: 2 tab(s) orally 3 times a day (13 Jun 2019 12:20)        MEDICATIONS  (STANDING):  atorvastatin 40 milliGRAM(s) Oral at bedtime  carbidopa/levodopa  25/100 2 Tablet(s) Oral three times a day  ceFAZolin   IVPB      dextrose 5%. 1000 milliLiter(s) (50 mL/Hr) IV Continuous <Continuous>  dextrose 50% Injectable 12.5 Gram(s) IV Push once  dextrose 50% Injectable 25 Gram(s) IV Push once  dextrose 50% Injectable 25 Gram(s) IV Push once  diphtheria/tetanus/pertussis (acellular) Vaccine (BOOSTRIX) 0.5 milliLiter(s) IntraMuscular once  insulin glargine Injectable (LANTUS) 20 Unit(s) SubCutaneous at bedtime  insulin lispro (HumaLOG) corrective regimen sliding scale   SubCutaneous three times a day before meals  insulin lispro (HumaLOG) corrective regimen sliding scale   SubCutaneous at bedtime  lactobacillus acidophilus 1 Tablet(s) Oral three times a day with meals  pramipexole 3 milliGRAM(s) Oral two times a day  rasagiline Tablet 1 milliGRAM(s) Oral daily  warfarin 4 milliGRAM(s) Oral once    MEDICATIONS  (PRN):  dextrose 40% Gel 15 Gram(s) Oral once PRN Blood Glucose LESS THAN 70 milliGRAM(s)/deciliter  glucagon  Injectable 1 milliGRAM(s) IntraMuscular once PRN Glucose LESS THAN 70 milligrams/deciliter      Allergies    No Known Allergies    Intolerances        REVIEW OF SYSTEMS: Negative except as per HPI.    VITAL SIGNS:   Vital Signs Last 24 Hrs  T(C): 36.8 (13 Jun 2019 08:48), Max: 36.8 (13 Jun 2019 08:48)  T(F): 98.2 (13 Jun 2019 08:48), Max: 98.2 (13 Jun 2019 08:48)  HR: 69 (13 Jun 2019 08:48) (69 - 69)  BP: 178/82 (13 Jun 2019 08:48) (178/82 - 178/82)  BP(mean): --  RR: 18 (13 Jun 2019 08:48) (18 - 18)  SpO2: 99% (13 Jun 2019 08:48) (99% - 99%)    I&O's Summary      PHYSICAL EXAM:  Constitutional: NAD, well-developed  HEENT NC/AT, moist mucous membranes  Pulmonary: Non-labored, breath sounds are clear bilaterally, no wheezing, rales or rhonchi  Cardiovascular: +S1, S2, RRR, prosthetic valve auscultated, systolic murmur  Gastrointestinal: Soft, nontender, nondistended, normoactive bowel sounds  Extremities: No peripheral edema, LLE skin wound noted on anterior tibial region   Neurological: Alert, strength and sensitivity are grossly intact  Skin: Skin wound noted on anterior tibial region, otherwise warm, well perfused  Psych: Mood & affect appropriate    LABS: All Labs Reviewed:                        14.1   6.10  )-----------( 134      ( 13 Jun 2019 09:33 )             42.7     13 Jun 2019 09:33    136    |  102    |  10     ----------------------------<  196    4.1     |  29     |  0.85     Ca    9.2        13 Jun 2019 09:33    TPro  7.4    /  Alb  4.3    /  TBili  0.9    /  DBili  x      /  AST  21     /  ALT  32     /  AlkPhos  94     13 Jun 2019 09:33    PT/INR - ( 13 Jun 2019 09:33 )   PT: 32.7 sec;   INR: 2.80 ratio         PTT - ( 13 Jun 2019 09:33 )  PTT:50.4 sec      Blood Culture:         EKG:    RADIOLOGY:    CXR: [facility]         Pj Rodriguez, Josie, Rosalia, Isaiah, Hansa Fulton        [phone] (office)    Reason for Consult: mechanical aortic valve      HPI: Patient is a 73 yo male with pmhx of bicuspid aortic stenosis s/p mechanical aortic valve in 2004, CAD s/p CABG of circumflex artery, DM, HTN, and Parkinsons disease, presented to ED with LLE cellulitis that failed outpatient abx therapy. Patient seen and examined at bedside, complaining of LLE pain, otherwise feeling well. Denies any chest pain, palpitations, SOB, RESENDIZ, or dizziness. Last seen Dr. Rodriguez in office in 1/2019, reported that Dr. Rodriguez increased his coumadin dose to 7.5 and 10mg on alternating days during the week to maintain goal INR after he started on his parkinson's medications.      PAST MEDICAL & SURGICAL HISTORY:  Mechanical heart valve present  CABG  DM (diabetes mellitus)  Parkinson disease  HTN (hypertension)      SOCIAL HISTORY: No active tobacco, alcohol or illicit drug use    FAMILY HISTORY:  Family hx of DM  Family hx of CVA    Home Medications:  amlodipine-benazepril 5 mg-20 mg oral capsule: 1 cap(s) orally once a day (13 Jun 2019 12:20)  atorvastatin 40 mg oral tablet: 1 tab(s) orally once a day (13 Jun 2019 12:20)  Coumadin: pt takes 5 to 7.5mg q daily pending INR findings (monitors INR&#x27;s at home) (13 Jun 2019 12:20)  HumaLOG:  (13 Jun 2019 12:19)  Lantus: 30  subcutaneous once a day (at bedtime) (13 Jun 2019 12:20)  metoprolol succinate 50 mg oral tablet, extended release: 1 tab(s) orally once a day (13 Jun 2019 12:20)  pramipexole 0.75 mg oral tablet, extended release: 4 tab(s) orally 2 times a day (13 Jun 2019 12:20)  rasagiline 1 mg oral tablet: 1 tab(s) orally once a day (13 Jun 2019 12:20)  Sinemet 25 mg-100 mg oral tablet: 2 tab(s) orally 3 times a day (13 Jun 2019 12:20)      MEDICATIONS  (STANDING):  atorvastatin 40 milliGRAM(s) Oral at bedtime  carbidopa/levodopa  25/100 2 Tablet(s) Oral three times a day  ceFAZolin   IVPB 1000 milliGRAM(s) IV Intermittent once  ceFAZolin   IVPB 1000 milliGRAM(s) IV Intermittent every 8 hours  ceFAZolin   IVPB      dextrose 5%. 1000 milliLiter(s) (50 mL/Hr) IV Continuous <Continuous>  dextrose 50% Injectable 12.5 Gram(s) IV Push once  dextrose 50% Injectable 25 Gram(s) IV Push once  dextrose 50% Injectable 25 Gram(s) IV Push once  diphtheria/tetanus/pertussis (acellular) Vaccine (BOOSTRIX) 0.5 milliLiter(s) IntraMuscular once  insulin glargine Injectable (LANTUS) 20 Unit(s) SubCutaneous at bedtime  insulin lispro (HumaLOG) corrective regimen sliding scale   SubCutaneous three times a day before meals  insulin lispro (HumaLOG) corrective regimen sliding scale   SubCutaneous at bedtime  lactobacillus acidophilus 1 Tablet(s) Oral three times a day with meals  pramipexole 3 milliGRAM(s) Oral two times a day  rasagiline Tablet 1 milliGRAM(s) Oral daily  warfarin 4 milliGRAM(s) Oral once    MEDICATIONS  (PRN):  acetaminophen   Tablet .. 650 milliGRAM(s) Oral every 6 hours PRN Temp greater or equal to 38C (100.4F), Mild Pain (1 - 3)  dextrose 40% Gel 15 Gram(s) Oral once PRN Blood Glucose LESS THAN 70 milliGRAM(s)/deciliter  glucagon  Injectable 1 milliGRAM(s) IntraMuscular once PRN Glucose LESS THAN 70 milligrams/deciliter        Allergies    No Known Allergies    Intolerances        REVIEW OF SYSTEMS: Negative except as per HPI.    VITAL SIGNS:   Vital Signs Last 24 Hrs  T(C): 36.8 (13 Jun 2019 08:48), Max: 36.8 (13 Jun 2019 08:48)  T(F): 98.2 (13 Jun 2019 08:48), Max: 98.2 (13 Jun 2019 08:48)  HR: 69 (13 Jun 2019 08:48) (69 - 69)  BP: 178/82 (13 Jun 2019 08:48) (178/82 - 178/82)  BP(mean): --  RR: 18 (13 Jun 2019 08:48) (18 - 18)  SpO2: 99% (13 Jun 2019 08:48) (99% - 99%)    I&O's Summary      PHYSICAL EXAM:  Constitutional: NAD, well-developed  HEENT NC/AT, moist mucous membranes  Pulmonary: Non-labored, breath sounds are clear bilaterally, no wheezing, rales or rhonchi  Cardiovascular: +S1, S2, RRR, mechanical click auscultated, systolic murmur  Gastrointestinal: Soft, nontender, nondistended, normoactive bowel sounds  Extremities: LLE edema, erythema, skin wound noted on anterior tibial region   Neurological: Alert and oriented, conversational, no focal deficits   Skin: Skin wound and erythema noted on anterior tibial region, otherwise warm, well perfused  Psych: Mood & affect appropriate    LABS: All Labs Reviewed:                        14.1   6.10  )-----------( 134      ( 13 Jun 2019 09:33 )             42.7     13 Jun 2019 09:33    136    |  102    |  10     ----------------------------<  196    4.1     |  29     |  0.85     Ca    9.2        13 Jun 2019 09:33    TPro  7.4    /  Alb  4.3    /  TBili  0.9    /  DBili  x      /  AST  21     /  ALT  32     /  AlkPhos  94     13 Jun 2019 09:33    PT/INR - ( 13 Jun 2019 09:33 )   PT: 32.7 sec;   INR: 2.80 ratio         PTT - ( 13 Jun 2019 09:33 )  PTT:50.4 sec      Blood Culture:         EKG: sinus rhythm 69 bpm, RBBB    RADIOLOGY:    CXR:

## 2019-06-13 NOTE — ED ADULT NURSE REASSESSMENT NOTE - NS ED NURSE REASSESS COMMENT FT1
Patient is resting comfortably. Patient is being admitted to reg floor. Pending for bed availability.

## 2019-06-13 NOTE — ED PROVIDER NOTE - OBJECTIVE STATEMENT
Pt. present to ED due to worsening Left lower leg wound. Pt. fell on 6/7 while walking in a wooded area. Pt. scraped his left upper back and left forearm and left lower leg. Pt. went to his PMD's office on 6/10 for his leg wound. Pt. was started on Cefuroxime 500mg BID and Mupirocin 2% cream on 6/10. Pt. states that the redness to his leg has increased and that he is having Now having pain to his lower leg. Pt. went back today for follow up and was told by the doctor that he should go to the ED for further evaluation and IV ABXs since the wound was getting worst.  NO fever. NO chills. Increase swelling to his left foot as per wife. Pt. has hx of HTN/DM/CABG/Valve repair on coumadin.

## 2019-06-13 NOTE — H&P ADULT - ATTENDING COMMENTS
- cellulitis, failed outpt therapy, add ancef q8, probiotic, elevate extremity, check ESR/CRP, ID consult (Blu), continue home meds, monitor for improvement The admission plan was discussed with the patient in detail. The patient's questions and concerns were addressed to the best of my ability. The patient is in agreement with the plan detailed above. The patient demonstrated adequate understanding of the plan and the counseling which I have provided.

## 2019-06-13 NOTE — PATIENT PROFILE ADULT - FALL HARM RISK CONCLUSION
Orders signed.  
Pt has been managed by anticoagulation clinic for Warfarin monitoring. Please complete the annual service to anticoagulation clinic [facility] so our office may continue to manage their warfarin therapy according to our standards and protocols.  
Fall with Harm Risk

## 2019-06-13 NOTE — ED PROVIDER NOTE - SKIN AREA #3
anterior/2 large area of abrasion with dry scabbing. Large area of erythema noted to anterior aspect of left lower leg. No calf tenderness.

## 2019-06-13 NOTE — ED PROVIDER NOTE - PROGRESS NOTE DETAILS
Pt. will be admitted for cellulitis. Failed outpatient therapy. Case discussed with Dr. Mackenzie for admission.

## 2019-06-13 NOTE — ED ADULT NURSE NOTE - OBJECTIVE STATEMENT
Received the patient in the Er. patient  is alert and oriented. Skin warm and dry. S/P fall last week. sustained abrasions and skin tear to left lower extremity. failed PO antibiotics. Patient is ambulatory.

## 2019-06-13 NOTE — H&P ADULT - HISTORY OF PRESENT ILLNESS
75yo M w/ PMHx mechanical AVR 2004 (for bicuspid aortic valve) on warfarin w/ INR goal 2.5-3.5, CABG (circumflex) in 2004, HTN, HLD, DM2 on insulin, carotid artery plaque, parkinson's, osteopenia, thrombocytopenia presents w/ rash on LLE for the past 4-5 days. Pt states he was walking in the woods behind his house in [place], [place] when he tripped over a loose stick and fell onto the ground. He suffered numerous scrapes on his skin but mostly localized to the LLE anterior shin. He states that he applied a topical OTC abx but symptoms worsened. He saw his PMD on 6/10/19 who recommended cefuroxime which the pt had been taking but symptoms worsened. Edema worsened, redness worsened. Pt today developed discomfort and some edema in the LLE so he again saw PMD who recommended eval in the ED. 75yo M w/ PMHx mechanical AVR 2004 (for bicuspid aortic valve) on warfarin w/ INR goal 2.5-3.5, CABG (circumflex) in 2004, HTN, HLD, DM2 on insulin, carotid artery plaque, parkinson's, osteopenia, thrombocytopenia presents w/ rash on LLE for the past 4-5 days. Pt states he was walking in the woods behind his house in [place], [place] when he tripped over a loose stick and fell onto the ground. He suffered numerous scrapes on his skin but mostly localized to the LLE anterior shin. He states that he applied a topical OTC abx but symptoms worsened. He saw his PMD on 6/10/19 who recommended cefuroxime which the pt had been taking but symptoms worsened. Edema worsened, redness worsened. Pt today developed discomfort and some edema in the LLE so he again saw PMD who recommended eval in the ED. Pt denies fever or chills. Denies insect bite. Last TDaP was in 2013.     in the ED was given clinda 73yo M w/ PMHx mechanical AVR 2004 (for bicuspid aortic valve) on warfarin w/ INR goal 2.5-3.5, grade II diastolic dysfunction (last EF 70%), CABG (circumflex) in 2004, HTN, HLD, DM2 on insulin, carotid artery plaque, parkinson's, osteopenia, thrombocytopenia presents w/ rash on LLE for the past 4-5 days. Pt states he was walking in the woods behind his house in [place], [place] when he tripped over a loose stick and fell onto the ground. He suffered numerous scrapes on his skin but mostly localized to the LLE anterior shin. He states that he applied a topical OTC abx but symptoms worsened. Initially he had a bloody scrape but now the lesion looks more like a scab. He became concerned when the redness started to expand beyond the borders of the scab. He saw his PMD on 6/10/19 who recommended cefuroxime which the pt had been taking but symptoms worsened. Edema worsened, redness worsened. Pt today developed discomfort and some edema in the LLE so he again saw PMD who recommended eval in the ED. Pt denies fever or chills. Denies insect bite. Last TDaP was in 2013. No international travel recently. No other recent abx use other than as detailed above. No hospital admissions.     In the ED, was given clindamycin 900mg IV

## 2019-06-13 NOTE — PHARMACOTHERAPY INTERVENTION NOTE - COMMENTS
Patient with mechanical aortic valve replacement on warfarin. Spoke with physician regarding goal of 2-3 for aortic valve replacement.

## 2019-06-13 NOTE — H&P ADULT - NSHPREVIEWOFSYSTEMS_GEN_ALL_CORE
CONSTITUTIONAL: denies fever, chills   HEENT: denies blurred vision, sore throat  SKIN: as per HPI  CARDIOVASCULAR: denies chest pain, chest pressure   RESPIRATORY: denies shortness of breath, sputum production  GASTROINTESTINAL: denies nausea, vomiting, diarrhea, abdominal pain   NEUROLOGICAL: denies numbness, headache, focal weakness  MUSCULOSKELETAL: denies new joint pain. admits LLE shin discomfort which has worsened the past 2 days  HEMATOLOGIC: denies gross bleeding, bruising  LYMPHATICS: denies enlarged lymph nodes, admits some LLE edema  PSYCHIATRIC: denies recent changes in anxiety, depression  ENDOCRINOLOGIC: denies sweating, cold or heat intolerance

## 2019-06-13 NOTE — H&P ADULT - PROBLEM SELECTOR PLAN 1
admit to [facility]  - [facility]. pt was on cefuroxime for 2 days and symptoms progressed. admit to [facility]  - [facility]. pt was on cefuroxime for 2 days and symptoms progressed. consulted ID (Sujatha Noel)

## 2019-06-13 NOTE — HISTORY OF PRESENT ILLNESS
[FreeTextEntry1] : infection on leg [de-identified] : Pt feel in the woods last Friday, 6/7/19. He has a large abrasion of the left shin. It is now red and around the scabs and having pain. The redness started on 6/11/19. His pain is worsening and his redness is worsening on oral antibiotics. He did work outside in his garden yesterday.\par He is diabetic and his glucose has been higher than usual.

## 2019-06-13 NOTE — H&P ADULT - NSHPPHYSICALEXAM_GEN_ALL_CORE
T(C): 36.8 (06-13-19 @ 08:48), Max: 36.8 (06-13-19 @ 08:48)  HR: 69 (06-13-19 @ 08:48) (69 - 69)  BP: 178/82 (06-13-19 @ 08:48) (178/82 - 178/82)  RR: 18 (06-13-19 @ 08:48) (18 - 18)  SpO2: 99% (06-13-19 @ 08:48) (99% - 99%)    GENERAL: patient appears comfortable, affect is somewhat flat but he is appropriately interactive, appears to be good historian, nontoxic  ENMT: oropharynx clear without erythema, no exudates, moist mucous membranes  LUNGS: good air entry bilaterally, clear to auscultation, symmetric breath sounds, no wheezing or rhonchi appreciated  HEART: soft S1/S2, regular rate and rhythm, systolic murmur in LLSB, trace 1+ pitting edema in LLE to the knee/shin  GASTROINTESTINAL: abdomen is soft, nontender, nondistended, normoactive bowel sounds, no palpable masses  INTEGUMENT: there is erythema and warmth around the periphery of a scab on the L anterior shin which extends from distal to proximal shin and is not circumferential, w/ corresponding edema  MUSCULOSKELETAL: no clubbing or cyanosis, no obvious deformity  NEUROLOGIC: awake, alert, oriented x3, good muscle tone in 4 extremities, no obvious sensory deficits, resting tremor in b/l hands, movements are slow and deliberate  HEME/LYMPH: no palpable supraclavicular nodules, no obvious ecchymosis or petechiae

## 2019-06-13 NOTE — H&P ADULT - ASSESSMENT
73yo M w/ PMHx mechanical AVR 2004 (for bicuspid aortic valve) on warfarin w/ INR goal 2.5-3.5, grade II diastolic dysfunction (last EF 70%), CABG (circumflex) in 2004, HTN, HLD, DM2 on insulin, carotid artery plaque, parkinson's, osteopenia, thrombocytopenia presents w/ LLE cellulitis failed outpt therapy

## 2019-06-13 NOTE — H&P ADULT - PROBLEM SELECTOR PLAN 2
INR goal is 2.5-3.5, will dose warfarin 4mg tonight (usually takes 5-7.5mg but ancef may elevate the INR), will monitor INR q daily and dose adjust dose accordingly

## 2019-06-14 DIAGNOSIS — T14.8XXA OTHER INJURY OF UNSPECIFIED BODY REGION, INITIAL ENCOUNTER: ICD-10-CM

## 2019-06-14 LAB
ALBUMIN SERPL ELPH-MCNC: 3.6 G/DL — SIGNIFICANT CHANGE UP (ref 3.3–5)
ALP SERPL-CCNC: 75 U/L — SIGNIFICANT CHANGE UP (ref 40–120)
ALT FLD-CCNC: 6 U/L — LOW (ref 12–78)
ANION GAP SERPL CALC-SCNC: 6 MMOL/L — SIGNIFICANT CHANGE UP (ref 5–17)
AST SERPL-CCNC: 28 U/L — SIGNIFICANT CHANGE UP (ref 15–37)
BASOPHILS # BLD AUTO: 0.02 K/UL — SIGNIFICANT CHANGE UP (ref 0–0.2)
BASOPHILS NFR BLD AUTO: 0.5 % — SIGNIFICANT CHANGE UP (ref 0–2)
BILIRUB SERPL-MCNC: 1 MG/DL — SIGNIFICANT CHANGE UP (ref 0.2–1.2)
BUN SERPL-MCNC: 10 MG/DL — SIGNIFICANT CHANGE UP (ref 7–23)
CALCIUM SERPL-MCNC: 8.9 MG/DL — SIGNIFICANT CHANGE UP (ref 8.5–10.1)
CHLORIDE SERPL-SCNC: 103 MMOL/L — SIGNIFICANT CHANGE UP (ref 96–108)
CO2 SERPL-SCNC: 28 MMOL/L — SIGNIFICANT CHANGE UP (ref 22–31)
CREAT SERPL-MCNC: 0.89 MG/DL — SIGNIFICANT CHANGE UP (ref 0.5–1.3)
EOSINOPHIL # BLD AUTO: 0.17 K/UL — SIGNIFICANT CHANGE UP (ref 0–0.5)
EOSINOPHIL NFR BLD AUTO: 4 % — SIGNIFICANT CHANGE UP (ref 0–6)
GLUCOSE SERPL-MCNC: 138 MG/DL — HIGH (ref 70–99)
HBA1C BLD-MCNC: 7.6 % — HIGH (ref 4–5.6)
HCT VFR BLD CALC: 41 % — SIGNIFICANT CHANGE UP (ref 39–50)
HCV AB S/CO SERPL IA: 0.09 S/CO — SIGNIFICANT CHANGE UP (ref 0–0.99)
HCV AB SERPL-IMP: SIGNIFICANT CHANGE UP
HGB BLD-MCNC: 13.4 G/DL — SIGNIFICANT CHANGE UP (ref 13–17)
IMM GRANULOCYTES NFR BLD AUTO: 0.5 % — SIGNIFICANT CHANGE UP (ref 0–1.5)
INR BLD: 1.69 RATIO — HIGH (ref 0.88–1.16)
LYMPHOCYTES # BLD AUTO: 1.15 K/UL — SIGNIFICANT CHANGE UP (ref 1–3.3)
LYMPHOCYTES # BLD AUTO: 27 % — SIGNIFICANT CHANGE UP (ref 13–44)
MAGNESIUM SERPL-MCNC: 1.9 MG/DL — SIGNIFICANT CHANGE UP (ref 1.6–2.6)
MCHC RBC-ENTMCNC: 28.2 PG — SIGNIFICANT CHANGE UP (ref 27–34)
MCHC RBC-ENTMCNC: 32.7 GM/DL — SIGNIFICANT CHANGE UP (ref 32–36)
MCV RBC AUTO: 86.1 FL — SIGNIFICANT CHANGE UP (ref 80–100)
MONOCYTES # BLD AUTO: 0.35 K/UL — SIGNIFICANT CHANGE UP (ref 0–0.9)
MONOCYTES NFR BLD AUTO: 8.2 % — SIGNIFICANT CHANGE UP (ref 2–14)
MRSA PCR RESULT.: SIGNIFICANT CHANGE UP
NEUTROPHILS # BLD AUTO: 2.55 K/UL — SIGNIFICANT CHANGE UP (ref 1.8–7.4)
NEUTROPHILS NFR BLD AUTO: 59.8 % — SIGNIFICANT CHANGE UP (ref 43–77)
NRBC # BLD: 0 /100 WBCS — SIGNIFICANT CHANGE UP (ref 0–0)
PHOSPHATE SERPL-MCNC: 2.9 MG/DL — SIGNIFICANT CHANGE UP (ref 2.5–4.5)
PLATELET # BLD AUTO: 130 K/UL — LOW (ref 150–400)
POTASSIUM SERPL-MCNC: 3.9 MMOL/L — SIGNIFICANT CHANGE UP (ref 3.5–5.3)
POTASSIUM SERPL-SCNC: 3.9 MMOL/L — SIGNIFICANT CHANGE UP (ref 3.5–5.3)
PROT SERPL-MCNC: 6.9 G/DL — SIGNIFICANT CHANGE UP (ref 6–8.3)
PROTHROM AB SERPL-ACNC: 19.6 SEC — HIGH (ref 10–12.9)
RBC # BLD: 4.76 M/UL — SIGNIFICANT CHANGE UP (ref 4.2–5.8)
RBC # FLD: 13.7 % — SIGNIFICANT CHANGE UP (ref 10.3–14.5)
S AUREUS DNA NOSE QL NAA+PROBE: SIGNIFICANT CHANGE UP
SODIUM SERPL-SCNC: 137 MMOL/L — SIGNIFICANT CHANGE UP (ref 135–145)
WBC # BLD: 4.26 K/UL — SIGNIFICANT CHANGE UP (ref 3.8–10.5)
WBC # FLD AUTO: 4.26 K/UL — SIGNIFICANT CHANGE UP (ref 3.8–10.5)

## 2019-06-14 PROCEDURE — 99233 SBSQ HOSP IP/OBS HIGH 50: CPT

## 2019-06-14 PROCEDURE — 99232 SBSQ HOSP IP/OBS MODERATE 35: CPT

## 2019-06-14 RX ORDER — TETANUS TOXOID, REDUCED DIPHTHERIA TOXOID AND ACELLULAR PERTUSSIS VACCINE, ADSORBED 5; 2.5; 8; 8; 2.5 [IU]/.5ML; [IU]/.5ML; UG/.5ML; UG/.5ML; UG/.5ML
0.5 SUSPENSION INTRAMUSCULAR ONCE
Refills: 0 | Status: COMPLETED | OUTPATIENT
Start: 2019-06-14 | End: 2019-06-14

## 2019-06-14 RX ORDER — ENOXAPARIN SODIUM 100 MG/ML
70 INJECTION SUBCUTANEOUS EVERY 12 HOURS
Refills: 0 | Status: DISCONTINUED | OUTPATIENT
Start: 2019-06-14 | End: 2019-06-17

## 2019-06-14 RX ORDER — WARFARIN SODIUM 2.5 MG/1
5 TABLET ORAL ONCE
Refills: 0 | Status: DISCONTINUED | OUTPATIENT
Start: 2019-06-14 | End: 2019-06-14

## 2019-06-14 RX ORDER — WARFARIN SODIUM 2.5 MG/1
7.5 TABLET ORAL ONCE
Refills: 0 | Status: COMPLETED | OUTPATIENT
Start: 2019-06-14 | End: 2019-06-14

## 2019-06-14 RX ADMIN — CARBIDOPA AND LEVODOPA 2 TABLET(S): 25; 100 TABLET ORAL at 21:45

## 2019-06-14 RX ADMIN — Medication 100 MILLIGRAM(S): at 14:56

## 2019-06-14 RX ADMIN — CARBIDOPA AND LEVODOPA 2 TABLET(S): 25; 100 TABLET ORAL at 07:04

## 2019-06-14 RX ADMIN — LISINOPRIL 20 MILLIGRAM(S): 2.5 TABLET ORAL at 07:04

## 2019-06-14 RX ADMIN — PRAMIPEXOLE DIHYDROCHLORIDE 3 MILLIGRAM(S): 0.12 TABLET ORAL at 07:04

## 2019-06-14 RX ADMIN — ATORVASTATIN CALCIUM 40 MILLIGRAM(S): 80 TABLET, FILM COATED ORAL at 21:45

## 2019-06-14 RX ADMIN — Medication 100 MILLIGRAM(S): at 07:04

## 2019-06-14 RX ADMIN — INSULIN GLARGINE 20 UNIT(S): 100 INJECTION, SOLUTION SUBCUTANEOUS at 21:45

## 2019-06-14 RX ADMIN — ENOXAPARIN SODIUM 70 MILLIGRAM(S): 100 INJECTION SUBCUTANEOUS at 17:57

## 2019-06-14 RX ADMIN — CARBIDOPA AND LEVODOPA 2 TABLET(S): 25; 100 TABLET ORAL at 14:56

## 2019-06-14 RX ADMIN — Medication 1 TABLET(S): at 11:42

## 2019-06-14 RX ADMIN — PRAMIPEXOLE DIHYDROCHLORIDE 3 MILLIGRAM(S): 0.12 TABLET ORAL at 17:56

## 2019-06-14 RX ADMIN — Medication 2: at 17:28

## 2019-06-14 RX ADMIN — WARFARIN SODIUM 7.5 MILLIGRAM(S): 2.5 TABLET ORAL at 22:39

## 2019-06-14 RX ADMIN — RASAGILINE 1 MILLIGRAM(S): 0.5 TABLET ORAL at 11:45

## 2019-06-14 RX ADMIN — Medication 4: at 11:42

## 2019-06-14 RX ADMIN — AMLODIPINE BESYLATE 5 MILLIGRAM(S): 2.5 TABLET ORAL at 07:04

## 2019-06-14 RX ADMIN — Medication 1 TABLET(S): at 08:10

## 2019-06-14 RX ADMIN — TETANUS TOXOID, REDUCED DIPHTHERIA TOXOID AND ACELLULAR PERTUSSIS VACCINE, ADSORBED 0.5 MILLILITER(S): 5; 2.5; 8; 8; 2.5 SUSPENSION INTRAMUSCULAR at 05:27

## 2019-06-14 RX ADMIN — Medication 100 MILLIGRAM(S): at 21:45

## 2019-06-14 RX ADMIN — Medication 1 TABLET(S): at 17:57

## 2019-06-14 NOTE — CONSULT NOTE ADULT - SUBJECTIVE AND OBJECTIVE BOX
Chief Complaint: Skin abrasion left leg    HPI: One week ago sustained left leg skin abrasion, admitted with cellulitis.     PAST MEDICAL & SURGICAL HISTORY:  Mechanical heart valve present  DM (diabetes mellitus)  Parkinson disease  HTN (hypertension)  S/P CABG x 1  H/O aortic valve replacement      Allergies    No Known Allergies    Intolerances        MEDICATIONS  (STANDING):  amLODIPine   Tablet 5 milliGRAM(s) Oral daily  atorvastatin 40 milliGRAM(s) Oral at bedtime  carbidopa/levodopa  25/100 2 Tablet(s) Oral three times a day  ceFAZolin   IVPB 1000 milliGRAM(s) IV Intermittent every 8 hours  ceFAZolin   IVPB      dextrose 5%. 1000 milliLiter(s) (50 mL/Hr) IV Continuous <Continuous>  dextrose 50% Injectable 12.5 Gram(s) IV Push once  dextrose 50% Injectable 25 Gram(s) IV Push once  dextrose 50% Injectable 25 Gram(s) IV Push once  enoxaparin Injectable 70 milliGRAM(s) SubCutaneous every 12 hours  insulin glargine Injectable (LANTUS) 20 Unit(s) SubCutaneous at bedtime  insulin lispro (HumaLOG) corrective regimen sliding scale   SubCutaneous three times a day before meals  insulin lispro (HumaLOG) corrective regimen sliding scale   SubCutaneous at bedtime  lactobacillus acidophilus 1 Tablet(s) Oral three times a day with meals  lisinopril 20 milliGRAM(s) Oral daily  metoprolol succinate ER 50 milliGRAM(s) Oral daily  pramipexole 3 milliGRAM(s) Oral two times a day  rasagiline Tablet 1 milliGRAM(s) Oral daily  warfarin 5 milliGRAM(s) Oral once    MEDICATIONS  (PRN):  acetaminophen   Tablet .. 650 milliGRAM(s) Oral every 6 hours PRN Temp greater or equal to 38C (100.4F), Mild Pain (1 - 3)  dextrose 40% Gel 15 Gram(s) Oral once PRN Blood Glucose LESS THAN 70 milliGRAM(s)/deciliter  glucagon  Injectable 1 milliGRAM(s) IntraMuscular once PRN Glucose LESS THAN 70 milligrams/deciliter      FAMILY HISTORY:  FH: myocardial infarction  Family history of diabetes mellitus in mother          ROS:  CONSTITUTIONAL: No fever, weight loss, or fatigue  EYES: No eye pain, visual disturbances, or discharge  ENMT:  No difficulty hearing, tinnitus, vertigo; No sinus or throat pain  NECK: No pain or stiffness  BREASTS: No pain, masses, or nipple discharge  RESPIRATORY: No cough, wheezing, chills or hemoptysis; No shortness of breath  CARDIOVASCULAR: No chest pain, palpitations, dizziness, or leg swelling  GASTROINTESTINAL: No abdominal or epigastric pain. No nausea, vomiting, or hematemesis; No diarrhea or constipation. No melena or hematochezia.  GENITOURINARY: No dysuria, frequency, hematuria, or incontinence  NEUROLOGICAL: No headaches, memory loss, loss of strength, numbness, or tremors  SKIN: No itching, burning, rashes, or lesions   LYMPH NODES: No enlarged glands  ENDOCRINE: No heat or cold intolerance; No hair loss  MUSCULOSKELETAL: No joint pain or swelling; No muscle, back, or extremity pain  PSYCHIATRIC: No depression, anxiety, mood swings, or difficulty sleeping  HEME/LYMPH: No easy bruising, or bleeding gums  ALLERGY AND IMMUNOLOGIC: No hives or eczema    PHYSICAL EXAM-      Vital Signs Last 24 Hrs  T(C): 36.4 (14 Jun 2019 04:25), Max: 36.7 (13 Jun 2019 20:09)  T(F): 97.6 (14 Jun 2019 04:25), Max: 98.1 (13 Jun 2019 20:09)  HR: 57 (14 Jun 2019 04:25) (57 - 75)  BP: 124/71 (14 Jun 2019 11:31) (110/69 - 154/75)  BP(mean): --  RR: 17 (14 Jun 2019 04:25) (17 - 18)  SpO2: 97% (14 Jun 2019 04:25) (95% - 97%)    Constitutional: well developed, well nourished, no apparent distress, alert, oriented x 3.  Neck: Supple   Pulmonary: no respiratory distress, normal respiratory rhythm and effort, lungs are clear to auscultation/percussion. No CVA tenderness.  Cardiovascular: heart rate normal, normal sinus rhythm; no murmurs, gallops, rubs, heaves or thrills   Abdomen: soft, non-tender, +BS, no guarding/rebound/rigidity.  Vascular: Lower extremities are well perfused.   Extremities: WNL  Skin: Multiple areas of skin abrasion, covered with dry adherent scab, periwound with resolving cellulitis.                             13.4   4.26  )-----------( 130      ( 14 Jun 2019 08:30 )             41.0     06-14    137  |  103  |  10  ----------------------------<  138<H>  3.9   |  28  |  0.89    Ca    8.9      14 Jun 2019 08:30  Phos  2.9     06-14  Mg     1.9     06-14    TPro  6.9  /  Alb  3.6  /  TBili  1.0  /  DBili  x   /  AST  28  /  ALT  6<L>  /  AlkPhos  75  06-14      Radiology:

## 2019-06-14 NOTE — PHYSICAL THERAPY INITIAL EVALUATION ADULT - ADDITIONAL COMMENTS
Pt lives in house w/ no stairs to negotiate to enter.  Bedroom on 1st floor.  Pt is a community ambulator and is able to drive a car.  Pt report ambulate ~2 miles/day x 7 days/wk.

## 2019-06-14 NOTE — PROGRESS NOTE ADULT - ASSESSMENT
74m with parkinsons, dm, cad, mechanical valve AC  admitted with lle cellulitis s/p trauma  At this point in time changes appear to be postr-traumatic in nature.    Suggestions--  Maximum 7 days antibiotics in total  Transition to PO cephalexin 500mg PO TID reasonable  Local care per wound care  No ID objection to discharge  Thank you for the courtesy of this referral.  I'll sign off at this time.     Vargas Wong MD  [phone]

## 2019-06-14 NOTE — PROGRESS NOTE ADULT - SUBJECTIVE AND OBJECTIVE BOX
[facility], Division of Infectious Diseases  CRISTELA Rodriguez A. Lee  [phone]    Name: LANETTE HOLM  Age: 74y  Gender: Male  MRN: [id number]    Interval History--  Notes reviewed. Feeling ok. No pain with ambulation. No fevers, chills, or rigors. No complaints.    Past Medical History--  Mechanical heart valve present  DM (diabetes mellitus)  Parkinson disease  HTN (hypertension)  S/P CABG x 1  H/O aortic valve replacement      For details regarding the patient's social history, family history, and other miscellaneous elements, please refer the initial infectious diseases consultation and/or the admitting history and physical examination for this admission.    Allergies    No Known Allergies    Intolerances        Medications--  Antibiotics:  ceFAZolin   IVPB 1000 milliGRAM(s) IV Intermittent every 8 hours  ceFAZolin   IVPB        Immunologic:    Other:  acetaminophen   Tablet .. PRN  amLODIPine   Tablet  atorvastatin  carbidopa/levodopa  25/100  dextrose 40% Gel PRN  dextrose 5%.  dextrose 50% Injectable  dextrose 50% Injectable  dextrose 50% Injectable  enoxaparin Injectable  glucagon  Injectable PRN  insulin glargine Injectable (LANTUS)  insulin lispro (HumaLOG) corrective regimen sliding scale  insulin lispro (HumaLOG) corrective regimen sliding scale  lactobacillus acidophilus  lisinopril  metoprolol succinate ER  pramipexole  rasagiline Tablet  warfarin      Review of Systems--  A 10-point review of systems was obtained.   Review of systems otherwise negative except as previously noted.    Physical Examination--  Vital Signs: T(F): 98.4 (06-14-19 @ 13:38), Max: 98.4 (06-14-19 @ 13:38)  HR: 80 (06-14-19 @ 13:38)  BP: 101/65 (06-14-19 @ 13:38)  RR: 17 (06-14-19 @ 13:38)  SpO2: 97% (06-14-19 @ 13:38)  Wt(kg): --  General: Chronically ill-appearing Male in no acute distress.  HEENT: Mild bitemporal wasting. Anicteric. Conjunctiva pink and moist. Oropharynx clear.  Neck: Not rigid. No sense of mass.  Nodes: None palpable.  Lungs: Clear bilaterally without rales, wheezing or rhonchi  Heart: Regular rate and rhythm. No Murmur. No rub. No gallop. No palpable thrill.  Abdomen: Bowel sounds present and normoactive. Soft. Nondistended. Nontender. No sense of mass. No organomegaly.  Extremities: No cyanosis or clubbing. No edema. Coarse tremor LUE. L shin with post-traumatic changes.  Skin: Warm. Dry. Good turgor. No rash. No vasculitic stigmata.  Psychiatric: Appropriate affect and mood for situation.       Laboratory Studies--  CBC                        13.4   4.26  )-----------( 130      ( 14 Jun 2019 08:30 )             41.0       Chemistries  06-14    137  |  103  |  10  ----------------------------<  138<H>  3.9   |  28  |  0.89    Ca    8.9      14 Jun 2019 08:30  Phos  2.9     06-14  Mg     1.9     06-14    TPro  6.9  /  Alb  3.6  /  TBili  1.0  /  DBili  x   /  AST  28  /  ALT  6<L>  /  AlkPhos  75  06-14      Culture Data  None

## 2019-06-14 NOTE — PROGRESS NOTE ADULT - SUBJECTIVE AND OBJECTIVE BOX
Patient is a 74y old  Male who presents with a chief complaint of redness of skin, failed outpatient abx (14 Jun 2019 14:51)      INTERVAL HPI: Pt seen and examined. States he is feeling better would like to go home soon. Wife and daughter at bedsides. Spoke extensively regarding plan of care including antibiotic selection and bridging coumadin to goal inr. Pt and family understands.     OVERNIGHT EVENTS: none noted  T(F): 98.4 (06-14-19 @ 13:38), Max: 98.4 (06-14-19 @ 13:38)  HR: 80 (06-14-19 @ 13:38) (57 - 80)  BP: 101/65 (06-14-19 @ 13:38) (101/65 - 147/79)  RR: 17 (06-14-19 @ 13:38) (17 - 18)  SpO2: 97% (06-14-19 @ 13:38) (96% - 97%)  I&O's Summary    13 Jun 2019 07:01  -  14 Jun 2019 07:00  --------------------------------------------------------  IN: 0 mL / OUT: 500 mL / NET: -500 mL    14 Jun 2019 07:01  -  14 Jun 2019 18:09  --------------------------------------------------------  IN: 720 mL / OUT: 0 mL / NET: 720 mL        REVIEW OF SYSTEMS:  CONSTITUTIONAL: No fever, weight loss, or fatigue  RESPIRATORY: No cough, wheezing, chills or hemoptysis; No shortness of breath  CARDIOVASCULAR: No chest pain, palpitations, dizziness, or leg swelling  GASTROINTESTINAL: No abdominal or epigastric pain. No nausea, vomiting, or hematemesis; No diarrhea or constipation. No melena or hematochezia.  GENITOURINARY: No dysuria, frequency, hematuria, or incontinence  NEUROLOGICAL: No headaches, memory loss, loss of strength, numbness, or tremors  SKIN: No itching, burning, rashes, or lesions except LLE wound and redness with some mod swelling  LYMPH NODES: No enlarged glands  ENDOCRINE: No heat or cold intolerance; No hair loss  MUSCULOSKELETAL: No joint pain or swelling; No muscle, back, or extremity pain  PSYCHIATRIC: No depression, anxiety, mood swings, or difficulty sleeping    PHYSICAL EXAM:  GENERAL: NAD, well-groomed, well-developed  NERVOUS SYSTEM:  Alert & Oriented X3, Good concentration; Motor Strength 4/5 B/L upper and lower extremities  CHEST/LUNG: Clear to percussion bilaterally; No rales, rhonchi, wheezing, or rubs  HEART: Regular rate and rhythm; No murmurs, rubs, or gallops  ABDOMEN: Soft, Nontender, Nondistended; Bowel sounds present  EXTREMITIES:  2+ Peripheral Pulses, No clubbing, cyanosis,  except LLE erythema, ant wound and nonpitting edema  SKIN: LLE scabs ant lower leg with erythema and lower leg edema without pitting    LABS:                        13.4   4.26  )-----------( 130      ( 14 Jun 2019 08:30 )             41.0     06-14    137  |  103  |  10  ----------------------------<  138<H>  3.9   |  28  |  0.89    Ca    8.9      14 Jun 2019 08:30  Phos  2.9     06-14  Mg     1.9     06-14    TPro  6.9  /  Alb  3.6  /  TBili  1.0  /  DBili  x   /  AST  28  /  ALT  6<L>  /  AlkPhos  75  06-14    PT/INR - ( 14 Jun 2019 08:30 )   PT: 19.6 sec;   INR: 1.69 ratio         PTT - ( 13 Jun 2019 09:33 )  PTT:50.4 sec    CAPILLARY BLOOD GLUCOSE      POCT Blood Glucose.: 155 mg/dL (14 Jun 2019 16:58)  POCT Blood Glucose.: 204 mg/dL (14 Jun 2019 11:21)  POCT Blood Glucose.: 134 mg/dL (14 Jun 2019 07:38)  POCT Blood Glucose.: 120 mg/dL (14 Jun 2019 05:32)  POCT Blood Glucose.: 134 mg/dL (13 Jun 2019 21:00)              MEDICATIONS  (STANDING):  amLODIPine   Tablet 5 milliGRAM(s) Oral daily  atorvastatin 40 milliGRAM(s) Oral at bedtime  carbidopa/levodopa  25/100 2 Tablet(s) Oral three times a day  ceFAZolin   IVPB 1000 milliGRAM(s) IV Intermittent every 8 hours  ceFAZolin   IVPB      dextrose 5%. 1000 milliLiter(s) (50 mL/Hr) IV Continuous <Continuous>  dextrose 50% Injectable 12.5 Gram(s) IV Push once  dextrose 50% Injectable 25 Gram(s) IV Push once  dextrose 50% Injectable 25 Gram(s) IV Push once  enoxaparin Injectable 70 milliGRAM(s) SubCutaneous every 12 hours  insulin glargine Injectable (LANTUS) 20 Unit(s) SubCutaneous at bedtime  insulin lispro (HumaLOG) corrective regimen sliding scale   SubCutaneous three times a day before meals  insulin lispro (HumaLOG) corrective regimen sliding scale   SubCutaneous at bedtime  lactobacillus acidophilus 1 Tablet(s) Oral three times a day with meals  lisinopril 20 milliGRAM(s) Oral daily  metoprolol succinate ER 50 milliGRAM(s) Oral daily  pramipexole 3 milliGRAM(s) Oral two times a day  rasagiline Tablet 1 milliGRAM(s) Oral daily  warfarin 7.5 milliGRAM(s) Oral once    MEDICATIONS  (PRN):  acetaminophen   Tablet .. 650 milliGRAM(s) Oral every 6 hours PRN Temp greater or equal to 38C (100.4F), Mild Pain (1 - 3)  dextrose 40% Gel 15 Gram(s) Oral once PRN Blood Glucose LESS THAN 70 milliGRAM(s)/deciliter  glucagon  Injectable 1 milliGRAM(s) IntraMuscular once PRN Glucose LESS THAN 70 milligrams/deciliter

## 2019-06-14 NOTE — CONSULT NOTE ADULT - ASSESSMENT
74m with parkinsons, dm, cad, mechanical valve AC  admitted with lle cellulitis s/p trauma
Skin abrasion, left leg, resolving cellulitis.
Patient is a 73 yo male with pmhx of bicuspid aortic stenosis s/p mechanical aortic valve in 2004, CAD s/p CABG of circumflex artery, DM, HTN, and Parkinsons disease, presented to ED with LLE cellulitis that failed outpatient abx therapy.     - Patient was tried on cefuroxime outpatient which he reported did not improve his cellulitis  - In setting of being on coumadin for his mechanical aortic valve, antibiotic administration may increase his INR level. Will need to monitor closely and dose coumadin appropriately with goal INR 2-3    - Patient has no cardiac symptoms, last saw Dr. Rodriguez in office in 1/2019. Feels well, denies any chest pain, palpitations, sob, or caba.   - EKG showed no acute ST changes    - No evidence of volume overload at this time  - No hx of CHF   - TTE in 5/2017 showed EF 70%    - BP well controlled, continue home BP meds, monitor routine hemodynamics.  - Monitor and replete lytes, keep K>4, Mg>2.  - Other cardiovascular workup will depend on clinical course. All other workup per primary team.  - Will follow.

## 2019-06-14 NOTE — PROGRESS NOTE ADULT - ASSESSMENT
Patient is a 75 yo male with pmhx of bicuspid aortic stenosis s/p mechanical aortic valve in 2004, CAD s/p CABG of circumflex artery, DM, HTN, and Parkinsons disease, presented to ED with LLE cellulitis that failed outpatient abx therapy.     - Patient was tried on cefuroxime outpatient which he reported did not improve his cellulitis  - In setting of being on coumadin for his mechanical aortic valve, antibiotic administration may increase his INR level. Will need to monitor closely and dose coumadin appropriately with goal INR 2-3  - INR subtherapeutic agree with full dose lovenox until INR therapeutic    - Patient has no cardiac symptoms, last saw Dr. Rodriguez in office in 1/2019. Feels well, denies any chest pain, palpitations, sob, or caba.   - EKG showed no acute ST changes    - No evidence of volume overload at this time  - No hx of CHF   - TTE in 5/2017 showed EF 70%    - BP well controlled, continue home BP meds, monitor routine hemodynamics.  - Monitor and replete lytes, keep K>4, Mg>2.  - Other cardiovascular workup will depend on clinical course. All other workup per primary team.  - Will follow.  Bernard Garces [facility]  Cardiology 73 yo male with pmhx of bicuspid aortic stenosis s/p mechanical aortic valve in 2004, CAD s/p CABG of circumflex artery, DM, HTN, and Parkinsons disease, presented to ED with LLE cellulitis that failed outpatient abx therapy.     - Patient was tried on cefuroxime outpatient which he reported did not improve his cellulitis  - In setting of being on coumadin for his mechanical aortic valve, antibiotic administration may increase his INR level.    - INR subtherapeutic agree with full dose lovenox until INR therapeutic. Goal INR 2.5-3.5 Dose Coumadin 7.5    - Patient has no cardiac symptoms, last saw Dr. Rodriguez in office in 1/2019. Feels well, denies any chest pain, palpitations, sob, or caba.   - EKG showed no acute ST changes    - No evidence of volume overload at this time  - No hx of CHF   - TTE in 5/2017 showed EF 70%    - BP well controlled, continue home BP meds, monitor routine hemodynamics.  - Monitor and replete lytes, keep K>4, Mg>2.  - Other cardiovascular workup will depend on clinical course. All other workup per primary team.  - Will follow.  Bernard Garces [facility]  Cardiology

## 2019-06-14 NOTE — CONSULT NOTE ADULT - REASON FOR ADMISSION
redness of skin, failed outpatient abx

## 2019-06-14 NOTE — PHYSICAL THERAPY INITIAL EVALUATION ADULT - PERTINENT HX OF CURRENT PROBLEM, REHAB EVAL
75yo M presents w/ rash on LLE for the past 4-5 days. Pt states was walking in the woods [place] when he fell suffering numerous scrapes on his skin but mostly localized to the LLE anterior shin.  Pt saw his PMD on 6/10/19 who recommended cefuroxime edema and redness worsened. Pt now reported pain.

## 2019-06-14 NOTE — PROGRESS NOTE ADULT - PROBLEM SELECTOR PLAN 2
chronic, stable  INR subtherapeutic 1.69  INR goal is 2.5-3.5, will dose warfarin 7.5mg tonight (usually takes 5-7.5mg but ancef may elevate the INR), will monitor INR q daily and dose adjust dose accordingly

## 2019-06-14 NOTE — PROGRESS NOTE ADULT - SUBJECTIVE AND OBJECTIVE BOX
[facility] -- Pj Rodriguez, Josie, Rosalia, Donaldo Colon Savella  Office # [phone]      Follow Up:    mechanical aortic valve  Subjective/Observations:   No events overnight resting comfortably in bed.  No complaints of chest pain, dyspnea, or palpitations reported. No signs of orthopnea or PND.     REVIEW OF SYSTEMS: All other review of systems is negative unless indicated above    PAST MEDICAL & SURGICAL HISTORY:  Mechanical heart valve present  DM (diabetes mellitus)  Parkinson disease  HTN (hypertension)  S/P CABG x 1  H/O aortic valve replacement      MEDICATIONS  (STANDING):  amLODIPine   Tablet 5 milliGRAM(s) Oral daily  atorvastatin 40 milliGRAM(s) Oral at bedtime  carbidopa/levodopa  25/100 2 Tablet(s) Oral three times a day  ceFAZolin   IVPB 1000 milliGRAM(s) IV Intermittent every 8 hours  ceFAZolin   IVPB      dextrose 5%. 1000 milliLiter(s) (50 mL/Hr) IV Continuous <Continuous>  dextrose 50% Injectable 12.5 Gram(s) IV Push once  dextrose 50% Injectable 25 Gram(s) IV Push once  dextrose 50% Injectable 25 Gram(s) IV Push once  enoxaparin Injectable 70 milliGRAM(s) SubCutaneous every 12 hours  insulin glargine Injectable (LANTUS) 20 Unit(s) SubCutaneous at bedtime  insulin lispro (HumaLOG) corrective regimen sliding scale   SubCutaneous three times a day before meals  insulin lispro (HumaLOG) corrective regimen sliding scale   SubCutaneous at bedtime  lactobacillus acidophilus 1 Tablet(s) Oral three times a day with meals  lisinopril 20 milliGRAM(s) Oral daily  metoprolol succinate ER 50 milliGRAM(s) Oral daily  pramipexole 3 milliGRAM(s) Oral two times a day  rasagiline Tablet 1 milliGRAM(s) Oral daily  warfarin 7.5 milliGRAM(s) Oral once    MEDICATIONS  (PRN):  acetaminophen   Tablet .. 650 milliGRAM(s) Oral every 6 hours PRN Temp greater or equal to 38C (100.4F), Mild Pain (1 - 3)  dextrose 40% Gel 15 Gram(s) Oral once PRN Blood Glucose LESS THAN 70 milliGRAM(s)/deciliter  glucagon  Injectable 1 milliGRAM(s) IntraMuscular once PRN Glucose LESS THAN 70 milligrams/deciliter      Allergies    No Known Allergies    Intolerances        Vital Signs Last 24 Hrs  T(C): 36.9 (14 Jun 2019 13:38), Max: 36.9 (14 Jun 2019 13:38)  T(F): 98.4 (14 Jun 2019 13:38), Max: 98.4 (14 Jun 2019 13:38)  HR: 80 (14 Jun 2019 13:38) (57 - 80)  BP: 101/65 (14 Jun 2019 13:38) (101/65 - 154/75)  BP(mean): --  RR: 17 (14 Jun 2019 13:38) (17 - 18)  SpO2: 97% (14 Jun 2019 13:38) (95% - 97%)    I&O's Summary    13 Jun 2019 07:01  -  14 Jun 2019 07:00  --------------------------------------------------------  IN: 0 mL / OUT: 500 mL / NET: -500 mL    14 Jun 2019 07:01  -  14 Jun 2019 14:51  --------------------------------------------------------  IN: 720 mL / OUT: 0 mL / NET: 720 mL          PHYSICAL EXAM:  TELE: Off tele   Constitutional: NAD, awake and alert, well-developed  HEENT: Moist Mucous Membranes, Anicteric  Pulmonary: Non-labored, breath sounds are clear bilaterally, No wheezing, crackles or rhonchi  Cardiovascular: Regular, S1 and S2 nl, + murmur No rubs, gallops or clicks   Gastrointestinal: Bowel Sounds present, soft, nontender.   Lymph: No lymphadenopathy. No peripheral edema.  Skin: No visible rashes or ulcers.  Psych:  Mood & affect appropriate    LABS: All Labs Reviewed:                        13.4   4.26  )-----------( 130      ( 14 Jun 2019 08:30 )             41.0                         14.1   6.10  )-----------( 134      ( 13 Jun 2019 09:33 )             42.7     14 Jun 2019 08:30    137    |  103    |  10     ----------------------------<  138    3.9     |  28     |  0.89   13 Jun 2019 09:33    136    |  102    |  10     ----------------------------<  196    4.1     |  29     |  0.85     Ca    8.9        14 Jun 2019 08:30  Ca    9.2        13 Jun 2019 09:33  Phos  2.9       14 Jun 2019 08:30  Mg     1.9       14 Jun 2019 08:30    TPro  6.9    /  Alb  3.6    /  TBili  1.0    /  DBili  x      /  AST  28     /  ALT  6      /  AlkPhos  75     14 Jun 2019 08:30  TPro  7.4    /  Alb  4.3    /  TBili  0.9    /  DBili  x      /  AST  21     /  ALT  32     /  AlkPhos  94     13 Jun 2019 09:33    PT/INR - ( 14 Jun 2019 08:30 )   PT: 19.6 sec;   INR: 1.69 ratio         PTT - ( 13 Jun 2019 09:33 )  PTT:50.4 sec         ECG:  < from: 12 Lead ECG (06.13.19 @ 12:10) >  Ventricular Rate 69 BPM    Atrial Rate 69 BPM    P-R Interval 144 ms    QRS Duration 122 ms    Q-T Interval 410 ms    QTC Calculation(Bezet) 439 ms    P Axis 40 degrees    R Axis 27 degrees    T Axis -25 degrees    Diagnosis Line Sinus rhythm with premature atrial complexes  Right bundle branch block  T wave abnormality, consider inferior ischemia  Abnormal ECG  When compared with ECG of 26-NOV-2008 09:12,  premature atrial complexes are now present  Right bundle branch block is now present  Confirmed by YONI LUCIANO ([id number]) on 6/14/2019 2:31:37 PM    < end of copied text >    Echo:    Radiology:  < from: US Duplex Venous Lower Ext Ltd, Left (06.13.19 @ 16:00) >  EXAM:  US DPLX LWR EXT VEINS LTD LT                            PROCEDURE DATE:  06/13/2019          INTERPRETATION:  CLINICAL INFORMATION: Left lower extremity edema.    COMPARISON: None available.    TECHNIQUE: Duplex sonography of the LEFT LOWER extremity with color and   spectral Doppler, with and without compression.      FINDINGS:    There is normal compressibility of the left common femoral, femoral and   popliteal veins.     Doppler examination shows normal spontaneous and phasic flow.    No calf vein thrombosis is detected.    IMPRESSION:     No evidence of left lower extremity deep venous thrombosis.                    ALYX CHURCH M.D., ATTENDING RADIOLOGIST  This document has been electronically signed. Jun 13 2019  4:03PM        < end of copied text >           Bernard Garces ANP   Cardiology [facility] -- Pj Rodriguez, Josie, Rosalia, Donaldo Colon Savella  Office # [phone]      Follow Up:    mechanical aortic valve    Subjective/Observations:   No events overnight resting comfortably in bed.  No complaints of chest pain, dyspnea, or palpitations reported. No signs of orthopnea or PND.     REVIEW OF SYSTEMS: All other review of systems is negative unless indicated above    PAST MEDICAL & SURGICAL HISTORY:  Mechanical heart valve present  DM (diabetes mellitus)  Parkinson disease  HTN (hypertension)  S/P CABG x 1  H/O aortic valve replacement      MEDICATIONS  (STANDING):  amLODIPine   Tablet 5 milliGRAM(s) Oral daily  atorvastatin 40 milliGRAM(s) Oral at bedtime  carbidopa/levodopa  25/100 2 Tablet(s) Oral three times a day  ceFAZolin   IVPB 1000 milliGRAM(s) IV Intermittent every 8 hours  ceFAZolin   IVPB      dextrose 5%. 1000 milliLiter(s) (50 mL/Hr) IV Continuous <Continuous>  dextrose 50% Injectable 12.5 Gram(s) IV Push once  dextrose 50% Injectable 25 Gram(s) IV Push once  dextrose 50% Injectable 25 Gram(s) IV Push once  enoxaparin Injectable 70 milliGRAM(s) SubCutaneous every 12 hours  insulin glargine Injectable (LANTUS) 20 Unit(s) SubCutaneous at bedtime  insulin lispro (HumaLOG) corrective regimen sliding scale   SubCutaneous three times a day before meals  insulin lispro (HumaLOG) corrective regimen sliding scale   SubCutaneous at bedtime  lactobacillus acidophilus 1 Tablet(s) Oral three times a day with meals  lisinopril 20 milliGRAM(s) Oral daily  metoprolol succinate ER 50 milliGRAM(s) Oral daily  pramipexole 3 milliGRAM(s) Oral two times a day  rasagiline Tablet 1 milliGRAM(s) Oral daily  warfarin 7.5 milliGRAM(s) Oral once    MEDICATIONS  (PRN):  acetaminophen   Tablet .. 650 milliGRAM(s) Oral every 6 hours PRN Temp greater or equal to 38C (100.4F), Mild Pain (1 - 3)  dextrose 40% Gel 15 Gram(s) Oral once PRN Blood Glucose LESS THAN 70 milliGRAM(s)/deciliter  glucagon  Injectable 1 milliGRAM(s) IntraMuscular once PRN Glucose LESS THAN 70 milligrams/deciliter      Allergies    No Known Allergies    Intolerances        Vital Signs Last 24 Hrs  T(C): 36.9 (14 Jun 2019 13:38), Max: 36.9 (14 Jun 2019 13:38)  T(F): 98.4 (14 Jun 2019 13:38), Max: 98.4 (14 Jun 2019 13:38)  HR: 80 (14 Jun 2019 13:38) (57 - 80)  BP: 101/65 (14 Jun 2019 13:38) (101/65 - 154/75)  BP(mean): --  RR: 17 (14 Jun 2019 13:38) (17 - 18)  SpO2: 97% (14 Jun 2019 13:38) (95% - 97%)    I&O's Summary    13 Jun 2019 07:01  -  14 Jun 2019 07:00  --------------------------------------------------------  IN: 0 mL / OUT: 500 mL / NET: -500 mL    14 Jun 2019 07:01  -  14 Jun 2019 14:51  --------------------------------------------------------  IN: 720 mL / OUT: 0 mL / NET: 720 mL          PHYSICAL EXAM:  TELE: Off tele   Constitutional: NAD, awake and alert, well-developed  HEENT: Moist Mucous Membranes, Anicteric  Pulmonary: Non-labored, breath sounds are clear bilaterally, No wheezing, crackles or rhonchi  Cardiovascular: Regular, S1 and S2 nl, +mech sounds, No rubs, gallops or clicks   Gastrointestinal: Bowel Sounds present, soft, nontender.   Lymph: No lymphadenopathy. No peripheral edema.  Skin: No visible rashes or ulcers.  Psych:  Mood & affect appropriate    LABS: All Labs Reviewed:                        13.4   4.26  )-----------( 130      ( 14 Jun 2019 08:30 )             41.0                         14.1   6.10  )-----------( 134      ( 13 Jun 2019 09:33 )             42.7     14 Jun 2019 08:30    137    |  103    |  10     ----------------------------<  138    3.9     |  28     |  0.89   13 Jun 2019 09:33    136    |  102    |  10     ----------------------------<  196    4.1     |  29     |  0.85     Ca    8.9        14 Jun 2019 08:30  Ca    9.2        13 Jun 2019 09:33  Phos  2.9       14 Jun 2019 08:30  Mg     1.9       14 Jun 2019 08:30    TPro  6.9    /  Alb  3.6    /  TBili  1.0    /  DBili  x      /  AST  28     /  ALT  6      /  AlkPhos  75     14 Jun 2019 08:30  TPro  7.4    /  Alb  4.3    /  TBili  0.9    /  DBili  x      /  AST  21     /  ALT  32     /  AlkPhos  94     13 Jun 2019 09:33    PT/INR - ( 14 Jun 2019 08:30 )   PT: 19.6 sec;   INR: 1.69 ratio         PTT - ( 13 Jun 2019 09:33 )  PTT:50.4 sec         ECG:  < from: 12 Lead ECG (06.13.19 @ 12:10) >  Ventricular Rate 69 BPM    Atrial Rate 69 BPM    P-R Interval 144 ms    QRS Duration 122 ms    Q-T Interval 410 ms    QTC Calculation(Bezet) 439 ms    P Axis 40 degrees    R Axis 27 degrees    T Axis -25 degrees    Diagnosis Line Sinus rhythm with premature atrial complexes  Right bundle branch block  T wave abnormality, consider inferior ischemia  Abnormal ECG  When compared with ECG of 26-NOV-2008 09:12,  premature atrial complexes are now present  Right bundle branch block is now present  Confirmed by YONI LUCIANO ([id number]) on 6/14/2019 2:31:37 PM    < end of copied text >    Echo:    Radiology:  < from: US Duplex Venous Lower Ext Ltd, Left (06.13.19 @ 16:00) >  EXAM:  US DPLX LWR EXT VEINS LTD LT                            PROCEDURE DATE:  06/13/2019          INTERPRETATION:  CLINICAL INFORMATION: Left lower extremity edema.    COMPARISON: None available.    TECHNIQUE: Duplex sonography of the LEFT LOWER extremity with color and   spectral Doppler, with and without compression.      FINDINGS:    There is normal compressibility of the left common femoral, femoral and   popliteal veins.     Doppler examination shows normal spontaneous and phasic flow.    No calf vein thrombosis is detected.    IMPRESSION:     No evidence of left lower extremity deep venous thrombosis.                    ALYX CHURCH M.D., ATTENDING RADIOLOGIST  This document has been electronically signed. Jun 13 2019  4:03PM        < end of copied text >           Bernard Garces ANP   Cardiology

## 2019-06-15 DIAGNOSIS — M79.652 PAIN IN LEFT THIGH: ICD-10-CM

## 2019-06-15 LAB
ALBUMIN SERPL ELPH-MCNC: 3.2 G/DL — LOW (ref 3.3–5)
ALP SERPL-CCNC: 73 U/L — SIGNIFICANT CHANGE UP (ref 40–120)
ALT FLD-CCNC: 8 U/L — LOW (ref 12–78)
ANION GAP SERPL CALC-SCNC: 7 MMOL/L — SIGNIFICANT CHANGE UP (ref 5–17)
AST SERPL-CCNC: 20 U/L — SIGNIFICANT CHANGE UP (ref 15–37)
BASOPHILS # BLD AUTO: 0.03 K/UL — SIGNIFICANT CHANGE UP (ref 0–0.2)
BASOPHILS NFR BLD AUTO: 0.5 % — SIGNIFICANT CHANGE UP (ref 0–2)
BILIRUB SERPL-MCNC: 0.5 MG/DL — SIGNIFICANT CHANGE UP (ref 0.2–1.2)
BUN SERPL-MCNC: 10 MG/DL — SIGNIFICANT CHANGE UP (ref 7–23)
CALCIUM SERPL-MCNC: 8.2 MG/DL — LOW (ref 8.5–10.1)
CHLORIDE SERPL-SCNC: 101 MMOL/L — SIGNIFICANT CHANGE UP (ref 96–108)
CK SERPL-CCNC: 158 U/L — SIGNIFICANT CHANGE UP (ref 26–308)
CO2 SERPL-SCNC: 27 MMOL/L — SIGNIFICANT CHANGE UP (ref 22–31)
CREAT SERPL-MCNC: 0.95 MG/DL — SIGNIFICANT CHANGE UP (ref 0.5–1.3)
EOSINOPHIL # BLD AUTO: 0.23 K/UL — SIGNIFICANT CHANGE UP (ref 0–0.5)
EOSINOPHIL NFR BLD AUTO: 3.5 % — SIGNIFICANT CHANGE UP (ref 0–6)
GLUCOSE SERPL-MCNC: 196 MG/DL — HIGH (ref 70–99)
HCT VFR BLD CALC: 37.8 % — LOW (ref 39–50)
HGB BLD-MCNC: 12.7 G/DL — LOW (ref 13–17)
IMM GRANULOCYTES NFR BLD AUTO: 0.2 % — SIGNIFICANT CHANGE UP (ref 0–1.5)
INR BLD: 1.52 RATIO — HIGH (ref 0.88–1.16)
LYMPHOCYTES # BLD AUTO: 1.04 K/UL — SIGNIFICANT CHANGE UP (ref 1–3.3)
LYMPHOCYTES # BLD AUTO: 15.8 % — SIGNIFICANT CHANGE UP (ref 13–44)
MCHC RBC-ENTMCNC: 29 PG — SIGNIFICANT CHANGE UP (ref 27–34)
MCHC RBC-ENTMCNC: 33.6 GM/DL — SIGNIFICANT CHANGE UP (ref 32–36)
MCV RBC AUTO: 86.3 FL — SIGNIFICANT CHANGE UP (ref 80–100)
MONOCYTES # BLD AUTO: 0.46 K/UL — SIGNIFICANT CHANGE UP (ref 0–0.9)
MONOCYTES NFR BLD AUTO: 7 % — SIGNIFICANT CHANGE UP (ref 2–14)
NEUTROPHILS # BLD AUTO: 4.82 K/UL — SIGNIFICANT CHANGE UP (ref 1.8–7.4)
NEUTROPHILS NFR BLD AUTO: 73 % — SIGNIFICANT CHANGE UP (ref 43–77)
NRBC # BLD: 0 /100 WBCS — SIGNIFICANT CHANGE UP (ref 0–0)
PLATELET # BLD AUTO: 121 K/UL — LOW (ref 150–400)
POTASSIUM SERPL-MCNC: 4 MMOL/L — SIGNIFICANT CHANGE UP (ref 3.5–5.3)
POTASSIUM SERPL-SCNC: 4 MMOL/L — SIGNIFICANT CHANGE UP (ref 3.5–5.3)
PROCALCITONIN SERPL-MCNC: <0.05 — SIGNIFICANT CHANGE UP (ref 0–0.04)
PROT SERPL-MCNC: 6.3 G/DL — SIGNIFICANT CHANGE UP (ref 6–8.3)
PROTHROM AB SERPL-ACNC: 17.4 SEC — HIGH (ref 10–12.9)
RBC # BLD: 4.38 M/UL — SIGNIFICANT CHANGE UP (ref 4.2–5.8)
RBC # FLD: 13.3 % — SIGNIFICANT CHANGE UP (ref 10.3–14.5)
SODIUM SERPL-SCNC: 135 MMOL/L — SIGNIFICANT CHANGE UP (ref 135–145)
WBC # BLD: 6.59 K/UL — SIGNIFICANT CHANGE UP (ref 3.8–10.5)
WBC # FLD AUTO: 6.59 K/UL — SIGNIFICANT CHANGE UP (ref 3.8–10.5)

## 2019-06-15 PROCEDURE — 99232 SBSQ HOSP IP/OBS MODERATE 35: CPT

## 2019-06-15 PROCEDURE — 99233 SBSQ HOSP IP/OBS HIGH 50: CPT

## 2019-06-15 RX ORDER — CEPHALEXIN 500 MG
500 CAPSULE ORAL THREE TIMES A DAY
Refills: 0 | Status: DISCONTINUED | OUTPATIENT
Start: 2019-06-15 | End: 2019-06-17

## 2019-06-15 RX ORDER — WARFARIN SODIUM 2.5 MG/1
10 TABLET ORAL ONCE
Refills: 0 | Status: COMPLETED | OUTPATIENT
Start: 2019-06-15 | End: 2019-06-15

## 2019-06-15 RX ADMIN — Medication 1 TABLET(S): at 09:24

## 2019-06-15 RX ADMIN — Medication 1 TABLET(S): at 17:56

## 2019-06-15 RX ADMIN — ENOXAPARIN SODIUM 70 MILLIGRAM(S): 100 INJECTION SUBCUTANEOUS at 05:27

## 2019-06-15 RX ADMIN — AMLODIPINE BESYLATE 5 MILLIGRAM(S): 2.5 TABLET ORAL at 05:27

## 2019-06-15 RX ADMIN — PRAMIPEXOLE DIHYDROCHLORIDE 3 MILLIGRAM(S): 0.12 TABLET ORAL at 05:27

## 2019-06-15 RX ADMIN — Medication 50 MILLIGRAM(S): at 05:27

## 2019-06-15 RX ADMIN — Medication 100 MILLIGRAM(S): at 05:27

## 2019-06-15 RX ADMIN — ATORVASTATIN CALCIUM 40 MILLIGRAM(S): 80 TABLET, FILM COATED ORAL at 21:34

## 2019-06-15 RX ADMIN — CARBIDOPA AND LEVODOPA 2 TABLET(S): 25; 100 TABLET ORAL at 14:50

## 2019-06-15 RX ADMIN — Medication 1: at 22:25

## 2019-06-15 RX ADMIN — LISINOPRIL 20 MILLIGRAM(S): 2.5 TABLET ORAL at 05:27

## 2019-06-15 RX ADMIN — Medication 500 MILLIGRAM(S): at 14:50

## 2019-06-15 RX ADMIN — Medication 2: at 17:20

## 2019-06-15 RX ADMIN — Medication 2: at 12:32

## 2019-06-15 RX ADMIN — WARFARIN SODIUM 10 MILLIGRAM(S): 2.5 TABLET ORAL at 21:34

## 2019-06-15 RX ADMIN — Medication 500 MILLIGRAM(S): at 21:33

## 2019-06-15 RX ADMIN — Medication 1 TABLET(S): at 12:31

## 2019-06-15 RX ADMIN — Medication 2: at 09:24

## 2019-06-15 RX ADMIN — INSULIN GLARGINE 20 UNIT(S): 100 INJECTION, SOLUTION SUBCUTANEOUS at 22:26

## 2019-06-15 RX ADMIN — CARBIDOPA AND LEVODOPA 2 TABLET(S): 25; 100 TABLET ORAL at 05:27

## 2019-06-15 RX ADMIN — PRAMIPEXOLE DIHYDROCHLORIDE 3 MILLIGRAM(S): 0.12 TABLET ORAL at 17:57

## 2019-06-15 RX ADMIN — CARBIDOPA AND LEVODOPA 2 TABLET(S): 25; 100 TABLET ORAL at 21:34

## 2019-06-15 RX ADMIN — RASAGILINE 1 MILLIGRAM(S): 0.5 TABLET ORAL at 12:31

## 2019-06-15 RX ADMIN — ENOXAPARIN SODIUM 70 MILLIGRAM(S): 100 INJECTION SUBCUTANEOUS at 17:57

## 2019-06-15 NOTE — PROGRESS NOTE ADULT - ASSESSMENT
75 yo male with pmhx of bicuspid aortic stenosis s/p mechanical aortic valve in 2004, CAD s/p CABG of circumflex artery, DM, HTN, and Parkinsons disease, presented to ED with LLE cellulitis that failed outpatient abx therapy.       - In setting of being on coumadin for his mechanical aortic valve, antibiotic administration may increase his INR level.    - INR subtherapeutic agree with full dose lovenox until INR therapeutic. Goal INR 2.5-3.5   -Daily INR    - Patient has no cardiac symptoms, last saw Dr. Rodriguez in office in 1/2019. Feels well, denies any chest pain, palpitations, sob, or caba.   - EKG showed no acute ST changes    - No evidence of volume overload at this time  - No hx of CHF   - TTE in 5/2017 showed EF 70%    - BP well controlled, continue home BP meds, monitor routine hemodynamics.  - Monitor and replete lytes, keep K>4, Mg>2.  - Other cardiovascular workup will depend on clinical course. All other workup per primary team.  - Will follow.  Bernard Garces [facility]  Cardiology 73 yo male with pmhx of bicuspid aortic stenosis s/p mechanical aortic valve in 2004, CAD s/p CABG of circumflex artery, DM, HTN, and Parkinsons disease, presented to ED with LLE cellulitis that failed outpatient abx therapy.       - In setting of being on coumadin for his mechanical aortic valve, antibiotic administration may increase his INR level.    - INR subtherapeutic agree with full dose lovenox until INR therapeutic. Goal INR 2.5-3.5   -Daily INR. Give Coumadin 10mg tonight    - Patient has no cardiac symptoms, last saw Dr. Rodriguez in office in 1/2019. Feels well, denies any chest pain, palpitations, sob, or caba.   - EKG showed no acute ST changes    - No evidence of volume overload at this time  - No hx of CHF   - TTE in 5/2017 showed EF 70%    - BP well controlled, continue home BP meds, monitor routine hemodynamics.  - Monitor and replete lytes, keep K>4, Mg>2.  - Other cardiovascular workup will depend on clinical course. All other workup per primary team.  - Will follow.  Bernard Garces ANP  Cardiology

## 2019-06-15 NOTE — PROGRESS NOTE ADULT - ASSESSMENT
75yo M w/ PMHx mechanical AVR 2004 (for bicuspid aortic valve) on warfarin w/ INR goal 2.5-3.5, grade II diastolic dysfunction (last EF 70%), CABG (circumflex) in 2004, HTN, HLD, DM2 on insulin, carotid artery plaque, parkinson's, osteopenia, thrombocytopenia presents w/ LLE cellulitis failed outpt therapy

## 2019-06-15 NOTE — PROGRESS NOTE ADULT - SUBJECTIVE AND OBJECTIVE BOX
[facility] -- Pj Rodriguez, Josie, Rosalia, Donaldo Colon Savella  Office # [phone]      Follow Up:    [facility] aortic vavle  Subjective/Observations:   No events overnight resting comfortably in bed.  No complaints of chest pain, dyspnea, or palpitations reported. No signs of orthopnea or PND.     REVIEW OF SYSTEMS: All other review of systems is negative unless indicated above    PAST MEDICAL & SURGICAL HISTORY:  Mechanical heart valve present  DM (diabetes mellitus)  Parkinson disease  HTN (hypertension)  S/P CABG x 1  H/O aortic valve replacement      MEDICATIONS  (STANDING):  amLODIPine   Tablet 5 milliGRAM(s) Oral daily  atorvastatin 40 milliGRAM(s) Oral at bedtime  carbidopa/levodopa  25/100 2 Tablet(s) Oral three times a day  cephalexin 500 milliGRAM(s) Oral three times a day  dextrose 5%. 1000 milliLiter(s) (50 mL/Hr) IV Continuous <Continuous>  dextrose 50% Injectable 12.5 Gram(s) IV Push once  dextrose 50% Injectable 25 Gram(s) IV Push once  dextrose 50% Injectable 25 Gram(s) IV Push once  enoxaparin Injectable 70 milliGRAM(s) SubCutaneous every 12 hours  insulin glargine Injectable (LANTUS) 20 Unit(s) SubCutaneous at bedtime  insulin lispro (HumaLOG) corrective regimen sliding scale   SubCutaneous three times a day before meals  insulin lispro (HumaLOG) corrective regimen sliding scale   SubCutaneous at bedtime  lactobacillus acidophilus 1 Tablet(s) Oral three times a day with meals  lisinopril 20 milliGRAM(s) Oral daily  metoprolol succinate ER 50 milliGRAM(s) Oral daily  pramipexole 3 milliGRAM(s) Oral two times a day  rasagiline Tablet 1 milliGRAM(s) Oral daily  warfarin 10 milliGRAM(s) Oral once    MEDICATIONS  (PRN):  acetaminophen   Tablet .. 650 milliGRAM(s) Oral every 6 hours PRN Temp greater or equal to 38C (100.4F), Mild Pain (1 - 3)  dextrose 40% Gel 15 Gram(s) Oral once PRN Blood Glucose LESS THAN 70 milliGRAM(s)/deciliter  glucagon  Injectable 1 milliGRAM(s) IntraMuscular once PRN Glucose LESS THAN 70 milligrams/deciliter      Allergies    No Known Allergies    Intolerances        Vital Signs Last 24 Hrs  T(C): 36.8 (15 Rk 2019 13:37), Max: 36.9 (15 Rk 2019 05:00)  T(F): 98.2 (15 Rk 2019 13:37), Max: 98.5 (15 Rk 2019 05:00)  HR: 72 (15 Rk 2019 13:37) (62 - 72)  BP: 135/72 (15 Rk 2019 13:37) (125/70 - 135/72)  BP(mean): --  RR: 16 (15 Rk 2019 13:37) (16 - 18)  SpO2: 98% (15 Rk 2019 13:37) (95% - 100%)    I&O's Summary    14 Jun 2019 07:01  -  15 Rk 2019 07:00  --------------------------------------------------------  IN: 720 mL / OUT: 0 mL / NET: 720 mL          PHYSICAL EXAM:  TELE: Off tele   Constitutional: NAD, awake and alert, well-developed  HEENT: Moist Mucous Membranes, Anicteric  Pulmonary: Non-labored, breath sounds are clear bilaterally, No wheezing, crackles or rhonchi  Cardiovascular: Regular, S1 and S2 nl, + murmur No rubs, gallops or clicks   Gastrointestinal: Bowel Sounds present, soft, nontender.   Lymph: No lymphadenopathy. No peripheral edema.  Skin: No visible rashes or ulcers.  Psych:  Mood & affect appropriate    LABS: All Labs Reviewed:                        12.7   6.59  )-----------( 121      ( 15 Rk 2019 08:44 )             37.8                         13.4   4.26  )-----------( 130      ( 14 Jun 2019 08:30 )             41.0                         14.1   6.10  )-----------( 134      ( 13 Jun 2019 09:33 )             42.7     15 Rk 2019 08:44    135    |  101    |  10     ----------------------------<  196    4.0     |  27     |  0.95   14 Jun 2019 08:30    137    |  103    |  10     ----------------------------<  138    3.9     |  28     |  0.89   13 Jun 2019 09:33    136    |  102    |  10     ----------------------------<  196    4.1     |  29     |  0.85     Ca    8.2        15 Rk 2019 08:44  Ca    8.9        14 Jun 2019 08:30  Ca    9.2        13 Jun 2019 09:33  Phos  2.9       14 Jun 2019 08:30  Mg     1.9       14 Jun 2019 08:30    TPro  6.3    /  Alb  3.2    /  TBili  0.5    /  DBili  x      /  AST  20     /  ALT  8      /  AlkPhos  73     15 Rk 2019 08:44  TPro  6.9    /  Alb  3.6    /  TBili  1.0    /  DBili  x      /  AST  28     /  ALT  6      /  AlkPhos  75     14 Jun 2019 08:30  TPro  7.4    /  Alb  4.3    /  TBili  0.9    /  DBili  x      /  AST  21     /  ALT  32     /  AlkPhos  94     13 Jun 2019 09:33    PT/INR - ( 15 Rk 2019 08:44 )   PT: 17.4 sec;   INR: 1.52 ratio           ECG:  < from: 12 Lead ECG (06.13.19 @ 12:10) >  Ventricular Rate 69 BPM    Atrial Rate 69 BPM    P-R Interval 144 ms    QRS Duration 122 ms    Q-T Interval 410 ms    QTC Calculation(Bezet) 439 ms    P Axis 40 degrees    R Axis 27 degrees    T Axis -25 degrees    Diagnosis Line Sinus rhythm with premature atrial complexes  Right bundle branch block  T wave abnormality, consider inferior ischemia  Abnormal ECG  When compared with ECG of 26-NOV-2008 09:12,  premature atrial complexes are now present  Right bundle branch block is now present  Confirmed by YONI LUCIANO ([id number]) on 6/14/2019 2:31:37 PM    < end of copied text >    Echo:    Radiology:  < from: US Duplex Venous Lower Ext Ltd, Left (06.13.19 @ 16:00) >  EXAM:  US DPLX LWR EXT VEINS LTD LT                            PROCEDURE DATE:  06/13/2019          INTERPRETATION:  CLINICAL INFORMATION: Left lower extremity edema.    COMPARISON: None available.    TECHNIQUE: Duplex sonography of the LEFT LOWER extremity with color and   spectral Doppler, with and without compression.      FINDINGS:    There is normal compressibility of the left common femoral, femoral and   popliteal veins.     Doppler examination shows normal spontaneous and phasic flow.    No calf vein thrombosis is detected.    IMPRESSION:     No evidence of left lower extremity deep venous thrombosis.                    ALYX CHURCH M.D., ATTENDING RADIOLOGIST  This document has been electronically signed. Jun 13 2019  4:03PM        < end of copied text >           Bernard Garces ANP   Cardiology [facility] -- Pj Rodriguez, Josie, Rosalia, Donaldo Colon Savella  Office # [phone]      Follow Up:    [facility] aortic vavle    Subjective/Observations:   No events overnight resting comfortably in bed.  No complaints of chest pain, dyspnea, or palpitations reported. No signs of orthopnea or PND.     REVIEW OF SYSTEMS: All other review of systems is negative unless indicated above    PAST MEDICAL & SURGICAL HISTORY:  Mechanical heart valve present  DM (diabetes mellitus)  Parkinson disease  HTN (hypertension)  S/P CABG x 1  H/O aortic valve replacement      MEDICATIONS  (STANDING):  amLODIPine   Tablet 5 milliGRAM(s) Oral daily  atorvastatin 40 milliGRAM(s) Oral at bedtime  carbidopa/levodopa  25/100 2 Tablet(s) Oral three times a day  cephalexin 500 milliGRAM(s) Oral three times a day  dextrose 5%. 1000 milliLiter(s) (50 mL/Hr) IV Continuous <Continuous>  dextrose 50% Injectable 12.5 Gram(s) IV Push once  dextrose 50% Injectable 25 Gram(s) IV Push once  dextrose 50% Injectable 25 Gram(s) IV Push once  enoxaparin Injectable 70 milliGRAM(s) SubCutaneous every 12 hours  insulin glargine Injectable (LANTUS) 20 Unit(s) SubCutaneous at bedtime  insulin lispro (HumaLOG) corrective regimen sliding scale   SubCutaneous three times a day before meals  insulin lispro (HumaLOG) corrective regimen sliding scale   SubCutaneous at bedtime  lactobacillus acidophilus 1 Tablet(s) Oral three times a day with meals  lisinopril 20 milliGRAM(s) Oral daily  metoprolol succinate ER 50 milliGRAM(s) Oral daily  pramipexole 3 milliGRAM(s) Oral two times a day  rasagiline Tablet 1 milliGRAM(s) Oral daily  warfarin 10 milliGRAM(s) Oral once    MEDICATIONS  (PRN):  acetaminophen   Tablet .. 650 milliGRAM(s) Oral every 6 hours PRN Temp greater or equal to 38C (100.4F), Mild Pain (1 - 3)  dextrose 40% Gel 15 Gram(s) Oral once PRN Blood Glucose LESS THAN 70 milliGRAM(s)/deciliter  glucagon  Injectable 1 milliGRAM(s) IntraMuscular once PRN Glucose LESS THAN 70 milligrams/deciliter      Allergies    No Known Allergies    Intolerances        Vital Signs Last 24 Hrs  T(C): 36.8 (15 Rk 2019 13:37), Max: 36.9 (15 Rk 2019 05:00)  T(F): 98.2 (15 Rk 2019 13:37), Max: 98.5 (15 Rk 2019 05:00)  HR: 72 (15 Rk 2019 13:37) (62 - 72)  BP: 135/72 (15 Rk 2019 13:37) (125/70 - 135/72)  BP(mean): --  RR: 16 (15 Rk 2019 13:37) (16 - 18)  SpO2: 98% (15 Rk 2019 13:37) (95% - 100%)    I&O's Summary    14 Jun 2019 07:01  -  15 Rk 2019 07:00  --------------------------------------------------------  IN: 720 mL / OUT: 0 mL / NET: 720 mL          PHYSICAL EXAM:  TELE: Off tele   Constitutional: NAD, awake and alert, well-developed  HEENT: Moist Mucous Membranes, Anicteric  Pulmonary: Non-labored, breath sounds are clear bilaterally, No wheezing, crackles or rhonchi  Cardiovascular: Regular, S1 and S2 nl, + murmur No rubs, gallops or clicks   Gastrointestinal: Bowel Sounds present, soft, nontender.   Lymph: No lymphadenopathy. No peripheral edema.  Skin: No visible rashes or ulcers.  Psych:  Mood & affect appropriate    LABS: All Labs Reviewed:                        12.7   6.59  )-----------( 121      ( 15 Rk 2019 08:44 )             37.8                         13.4   4.26  )-----------( 130      ( 14 Jun 2019 08:30 )             41.0                         14.1   6.10  )-----------( 134      ( 13 Jun 2019 09:33 )             42.7     15 Rk 2019 08:44    135    |  101    |  10     ----------------------------<  196    4.0     |  27     |  0.95   14 Jun 2019 08:30    137    |  103    |  10     ----------------------------<  138    3.9     |  28     |  0.89   13 Jun 2019 09:33    136    |  102    |  10     ----------------------------<  196    4.1     |  29     |  0.85     Ca    8.2        15 Rk 2019 08:44  Ca    8.9        14 Jun 2019 08:30  Ca    9.2        13 Jun 2019 09:33  Phos  2.9       14 Jun 2019 08:30  Mg     1.9       14 Jun 2019 08:30    TPro  6.3    /  Alb  3.2    /  TBili  0.5    /  DBili  x      /  AST  20     /  ALT  8      /  AlkPhos  73     15 Rk 2019 08:44  TPro  6.9    /  Alb  3.6    /  TBili  1.0    /  DBili  x      /  AST  28     /  ALT  6      /  AlkPhos  75     14 Jun 2019 08:30  TPro  7.4    /  Alb  4.3    /  TBili  0.9    /  DBili  x      /  AST  21     /  ALT  32     /  AlkPhos  94     13 Jun 2019 09:33    PT/INR - ( 15 Kr 2019 08:44 )   PT: 17.4 sec;   INR: 1.52 ratio           ECG:  < from: 12 Lead ECG (06.13.19 @ 12:10) >  Ventricular Rate 69 BPM    Atrial Rate 69 BPM    P-R Interval 144 ms    QRS Duration 122 ms    Q-T Interval 410 ms    QTC Calculation(Bezet) 439 ms    P Axis 40 degrees    R Axis 27 degrees    T Axis -25 degrees    Diagnosis Line Sinus rhythm with premature atrial complexes  Right bundle branch block  T wave abnormality, consider inferior ischemia  Abnormal ECG  When compared with ECG of 26-NOV-2008 09:12,  premature atrial complexes are now present  Right bundle branch block is now present  Confirmed by YONI LUCIANO ([id number]) on 6/14/2019 2:31:37 PM    < end of copied text >    Echo:    Radiology:  < from: US Duplex Venous Lower Ext Ltd, Left (06.13.19 @ 16:00) >  EXAM:  US DPLX LWR EXT VEINS LTD LT                            PROCEDURE DATE:  06/13/2019          INTERPRETATION:  CLINICAL INFORMATION: Left lower extremity edema.    COMPARISON: None available.    TECHNIQUE: Duplex sonography of the LEFT LOWER extremity with color and   spectral Doppler, with and without compression.      FINDINGS:    There is normal compressibility of the left common femoral, femoral and   popliteal veins.     Doppler examination shows normal spontaneous and phasic flow.    No calf vein thrombosis is detected.    IMPRESSION:     No evidence of left lower extremity deep venous thrombosis.                    ALYX CHURCH M.D., ATTENDING RADIOLOGIST  This document has been electronically signed. Jun 13 2019  4:03PM        < end of copied text >           Bernard Garces ANP   Cardiology

## 2019-06-15 NOTE — PROGRESS NOTE ADULT - SUBJECTIVE AND OBJECTIVE BOX
infectious diseases progress note:    LANETTE HOLM is a 74y y. o. Male patient    Patient with no concerning overnight events    Allergies    No Known Allergies    Intolerances        ANTIBIOTICS/RELEVANT:  antimicrobials  ceFAZolin   IVPB 1000 milliGRAM(s) IV Intermittent every 8 hours  ceFAZolin   IVPB        immunologic:    OTHER:  acetaminophen   Tablet .. 650 milliGRAM(s) Oral every 6 hours PRN  amLODIPine   Tablet 5 milliGRAM(s) Oral daily  atorvastatin 40 milliGRAM(s) Oral at bedtime  carbidopa/levodopa  25/100 2 Tablet(s) Oral three times a day  dextrose 40% Gel 15 Gram(s) Oral once PRN  dextrose 5%. 1000 milliLiter(s) IV Continuous <Continuous>  dextrose 50% Injectable 12.5 Gram(s) IV Push once  dextrose 50% Injectable 25 Gram(s) IV Push once  dextrose 50% Injectable 25 Gram(s) IV Push once  enoxaparin Injectable 70 milliGRAM(s) SubCutaneous every 12 hours  glucagon  Injectable 1 milliGRAM(s) IntraMuscular once PRN  insulin glargine Injectable (LANTUS) 20 Unit(s) SubCutaneous at bedtime  insulin lispro (HumaLOG) corrective regimen sliding scale   SubCutaneous three times a day before meals  insulin lispro (HumaLOG) corrective regimen sliding scale   SubCutaneous at bedtime  lactobacillus acidophilus 1 Tablet(s) Oral three times a day with meals  lisinopril 20 milliGRAM(s) Oral daily  metoprolol succinate ER 50 milliGRAM(s) Oral daily  pramipexole 3 milliGRAM(s) Oral two times a day  rasagiline Tablet 1 milliGRAM(s) Oral daily      Objective:  Vital Signs Last 24 Hrs  T(C): 36.9 (15 Rk 2019 05:00), Max: 36.9 (14 Jun 2019 13:38)  T(F): 98.5 (15 Rk 2019 05:00), Max: 98.5 (15 Rk 2019 05:00)  HR: 62 (15 Rk 2019 05:00) (62 - 80)  BP: 125/70 (15 Rk 2019 05:00) (101/65 - 126/67)  BP(mean): --  RR: 17 (15 Rk 2019 05:00) (17 - 18)  SpO2: 100% (15 Rk 2019 05:00) (95% - 100%)    T(C): 36.9 (06-15-19 @ 05:00), Max: 36.9 (06-14-19 @ 13:38)  T(C): 36.9 (06-15-19 @ 05:00), Max: 36.9 (06-14-19 @ 13:38)  T(C): 36.9 (06-15-19 @ 05:00), Max: 36.9 (06-14-19 @ 13:38)    PHYSICAL EXAM:  Constitutional: Well-developed, well nourished  Eyes: PERRLA, EOMI  Ear/Nose/Throat: oropharynx normal	  Neck: no JVD, no lymphadenopathy, supple  Respiratory: no accessory muscle use  Cardiovascular: RRR,   Gastrointestinal: soft, NT  Extremities: no clubbing, no cyanosis, edema absent      LABS:                        12.7   6.59  )-----------( 121      ( 15 Rk 2019 08:44 )             37.8       6.59 06-15 @ 08:44  4.26 06-14 @ 08:30  6.10 06-13 @ 09:33      06-15    135  |  101  |  10  ----------------------------<  196<H>  4.0   |  27  |  0.95    Ca    8.2<L>      15 Rk 2019 08:44  Phos  2.9     06-14  Mg     1.9     06-14    TPro  6.3  /  Alb  3.2<L>  /  TBili  0.5  /  DBili  x   /  AST  20  /  ALT  8<L>  /  AlkPhos  73  06-15      Creatinine, Serum: 0.95 mg/dL (06-15-19 @ 08:44)  Creatinine, Serum: 0.89 mg/dL (06-14-19 @ 08:30)  Creatinine, Serum: 0.85 mg/dL (06-13-19 @ 09:33)      PT/INR - ( 15 Rk 2019 08:44 )   PT: 17.4 sec;   INR: 1.52 ratio                   MICROBIOLOGY:              RADIOLOGY & ADDITIONAL STUDIES:

## 2019-06-15 NOTE — PROGRESS NOTE ADULT - ASSESSMENT
74m with parkinsons, dm, cad, mechanical valve AC  admitted with lle cellulitis s/p trauma  possibly post-traumatic in nature.    change to cephalexin 500mg PO TID when ready for dc  Local care per wound care    Thank you for consulting us and involving us in the management of this most interesting and challenging case.     Please Call with any further questions

## 2019-06-15 NOTE — PROGRESS NOTE ADULT - SUBJECTIVE AND OBJECTIVE BOX
Patient is a 74y old  Male who presents with a chief complaint of redness of skin, failed outpatient abx (15 Rk 2019 09:54)      INTERVAL HPI: Pt seen and examined. States he is feeling ok, has some upper R thigh pain. Denies any other acute complaints at this time. Wife at bedside.     OVERNIGHT EVENTS: none noted  T(F): 98.5 (06-15-19 @ 05:00), Max: 98.5 (06-15-19 @ 05:00)  HR: 62 (06-15-19 @ 05:00) (62 - 80)  BP: 125/70 (06-15-19 @ 05:00) (101/65 - 126/67)  RR: 17 (06-15-19 @ 05:00) (17 - 18)  SpO2: 100% (06-15-19 @ 05:00) (95% - 100%)  I&O's Summary    14 Jun 2019 07:01  -  15 Rk 2019 07:00  --------------------------------------------------------  IN: 720 mL / OUT: 0 mL / NET: 720 mL        REVIEW OF SYSTEMS:  CONSTITUTIONAL: No fever, weight loss, or fatigue  RESPIRATORY: No cough, wheezing, chills or hemoptysis; No shortness of breath  CARDIOVASCULAR: No chest pain, palpitations, dizziness, or leg swelling  GASTROINTESTINAL: No abdominal or epigastric pain. No nausea, vomiting, or hematemesis; No diarrhea or constipation. No melena or hematochezia.  GENITOURINARY: No dysuria, frequency, hematuria, or incontinence  NEUROLOGICAL: No headaches, memory loss, loss of strength, numbness, or tremors  SKIN: No itching, burning, rashes, or lesions   MUSCULOSKELETAL: No joint pain or swelling; No muscle, back, or extremity pain except L thigh pain with ambulation  PSYCHIATRIC: No depression, anxiety, mood swings, or difficulty sleeping      PHYSICAL EXAM:  GENERAL: NAD, well-groomed, well-developed, elder  NERVOUS SYSTEM:  Alert & Oriented X3, Good concentration; Motor Strength 5/5 B/L upper and lower extremities; CHEST/LUNG: Clear to percussion bilaterally; No rales, rhonchi, wheezing, or rubs  HEART: Regular rate and rhythm; No murmurs, rubs, or gallops  ABDOMEN: Soft, Nontender, Nondistended; Bowel sounds present  EXTREMITIES:  2+ Peripheral Pulses, No clubbing, cyanosis, or edema  SKIN: LLE erythema in gauze and xeroform, improved, edema is much improved as well over L ant lower ext    LABS:                        12.7   6.59  )-----------( 121      ( 15 Rk 2019 08:44 )             37.8     06-15    135  |  101  |  10  ----------------------------<  196<H>  4.0   |  27  |  0.95    Ca    8.2<L>      15 Rk 2019 08:44  Phos  2.9     06-14  Mg     1.9     06-14    TPro  6.3  /  Alb  3.2<L>  /  TBili  0.5  /  DBili  x   /  AST  20  /  ALT  8<L>  /  AlkPhos  73  06-15    PT/INR - ( 15 Rk 2019 08:44 )   PT: 17.4 sec;   INR: 1.52 ratio             CAPILLARY BLOOD GLUCOSE      POCT Blood Glucose.: 199 mg/dL (15 Rk 2019 11:53)  POCT Blood Glucose.: 176 mg/dL (15 Rk 2019 08:32)  POCT Blood Glucose.: 189 mg/dL (14 Jun 2019 21:08)  POCT Blood Glucose.: 155 mg/dL (14 Jun 2019 16:58)      06-13 @ 18:43   No growth to date.  --  --          MEDICATIONS  (STANDING):  amLODIPine   Tablet 5 milliGRAM(s) Oral daily  atorvastatin 40 milliGRAM(s) Oral at bedtime  carbidopa/levodopa  25/100 2 Tablet(s) Oral three times a day  ceFAZolin   IVPB 1000 milliGRAM(s) IV Intermittent every 8 hours  ceFAZolin   IVPB      dextrose 5%. 1000 milliLiter(s) (50 mL/Hr) IV Continuous <Continuous>  dextrose 50% Injectable 12.5 Gram(s) IV Push once  dextrose 50% Injectable 25 Gram(s) IV Push once  dextrose 50% Injectable 25 Gram(s) IV Push once  enoxaparin Injectable 70 milliGRAM(s) SubCutaneous every 12 hours  insulin glargine Injectable (LANTUS) 20 Unit(s) SubCutaneous at bedtime  insulin lispro (HumaLOG) corrective regimen sliding scale   SubCutaneous three times a day before meals  insulin lispro (HumaLOG) corrective regimen sliding scale   SubCutaneous at bedtime  lactobacillus acidophilus 1 Tablet(s) Oral three times a day with meals  lisinopril 20 milliGRAM(s) Oral daily  metoprolol succinate ER 50 milliGRAM(s) Oral daily  pramipexole 3 milliGRAM(s) Oral two times a day  rasagiline Tablet 1 milliGRAM(s) Oral daily  warfarin 10 milliGRAM(s) Oral once    MEDICATIONS  (PRN):  acetaminophen   Tablet .. 650 milliGRAM(s) Oral every 6 hours PRN Temp greater or equal to 38C (100.4F), Mild Pain (1 - 3)  dextrose 40% Gel 15 Gram(s) Oral once PRN Blood Glucose LESS THAN 70 milliGRAM(s)/deciliter  glucagon  Injectable 1 milliGRAM(s) IntraMuscular once PRN Glucose LESS THAN 70 milligrams/deciliter

## 2019-06-15 NOTE — PROGRESS NOTE ADULT - PROBLEM SELECTOR PLAN 1
acute   -cont ancef iv   -apprec ID recs Dr. Lagunas: Maximum 7 days antibiotics in total  Transition to PO cephalexin 500mg PO TID reasonable  -monitor clinical course acute, imrpoving  -dc ancef iv   -apprec ID recs Dr. Lagunas: Maximum 7 days antibiotics in total  Transition to PO cephalexin 500mg PO TID reasonable  -monitor clinical course

## 2019-06-15 NOTE — PROGRESS NOTE ADULT - PROBLEM SELECTOR PLAN 7
subtherapeutic inr today, bridge with lovenox and coumadin to goal 2.5-3.5 acute  check ck  tylenol prn  consider doppler vs xray or ct thigh if worsensor not improving

## 2019-06-15 NOTE — PROGRESS NOTE ADULT - PROBLEM SELECTOR PLAN 2
chronic, stable  sill subtherapeutic inr  INR goal is 2.5-3.5, will dose warfarin 10mg tonight (usually takes 5-7.5mg but ancef may elevate the INR), will monitor INR q daily and dose adjust dose accordingly chronic, stable  sill subtherapeutic inr, may be a fast metabolizer  INR goal is 2.5-3.5, will dose warfarin 10mg tonight, will monitor INR q daily and dose adjust dose accordingly

## 2019-06-16 LAB
ALBUMIN SERPL ELPH-MCNC: 3.4 G/DL — SIGNIFICANT CHANGE UP (ref 3.3–5)
ALP SERPL-CCNC: 72 U/L — SIGNIFICANT CHANGE UP (ref 40–120)
ALT FLD-CCNC: 7 U/L — LOW (ref 12–78)
ANION GAP SERPL CALC-SCNC: 8 MMOL/L — SIGNIFICANT CHANGE UP (ref 5–17)
AST SERPL-CCNC: 22 U/L — SIGNIFICANT CHANGE UP (ref 15–37)
BASOPHILS # BLD AUTO: 0.01 K/UL — SIGNIFICANT CHANGE UP (ref 0–0.2)
BASOPHILS NFR BLD AUTO: 0.2 % — SIGNIFICANT CHANGE UP (ref 0–2)
BILIRUB SERPL-MCNC: 0.8 MG/DL — SIGNIFICANT CHANGE UP (ref 0.2–1.2)
BUN SERPL-MCNC: 10 MG/DL — SIGNIFICANT CHANGE UP (ref 7–23)
CALCIUM SERPL-MCNC: 8.3 MG/DL — LOW (ref 8.5–10.1)
CHLORIDE SERPL-SCNC: 104 MMOL/L — SIGNIFICANT CHANGE UP (ref 96–108)
CO2 SERPL-SCNC: 24 MMOL/L — SIGNIFICANT CHANGE UP (ref 22–31)
CREAT SERPL-MCNC: 0.87 MG/DL — SIGNIFICANT CHANGE UP (ref 0.5–1.3)
EOSINOPHIL # BLD AUTO: 0.12 K/UL — SIGNIFICANT CHANGE UP (ref 0–0.5)
EOSINOPHIL NFR BLD AUTO: 2 % — SIGNIFICANT CHANGE UP (ref 0–6)
GLUCOSE SERPL-MCNC: 198 MG/DL — HIGH (ref 70–99)
HCT VFR BLD CALC: 39.3 % — SIGNIFICANT CHANGE UP (ref 39–50)
HGB BLD-MCNC: 12.9 G/DL — LOW (ref 13–17)
IMM GRANULOCYTES NFR BLD AUTO: 0.3 % — SIGNIFICANT CHANGE UP (ref 0–1.5)
INR BLD: 1.56 RATIO — HIGH (ref 0.88–1.16)
LYMPHOCYTES # BLD AUTO: 1.08 K/UL — SIGNIFICANT CHANGE UP (ref 1–3.3)
LYMPHOCYTES # BLD AUTO: 18.1 % — SIGNIFICANT CHANGE UP (ref 13–44)
MCHC RBC-ENTMCNC: 28 PG — SIGNIFICANT CHANGE UP (ref 27–34)
MCHC RBC-ENTMCNC: 32.8 GM/DL — SIGNIFICANT CHANGE UP (ref 32–36)
MCV RBC AUTO: 85.4 FL — SIGNIFICANT CHANGE UP (ref 80–100)
MONOCYTES # BLD AUTO: 0.44 K/UL — SIGNIFICANT CHANGE UP (ref 0–0.9)
MONOCYTES NFR BLD AUTO: 7.4 % — SIGNIFICANT CHANGE UP (ref 2–14)
NEUTROPHILS # BLD AUTO: 4.3 K/UL — SIGNIFICANT CHANGE UP (ref 1.8–7.4)
NEUTROPHILS NFR BLD AUTO: 72 % — SIGNIFICANT CHANGE UP (ref 43–77)
NRBC # BLD: 0 /100 WBCS — SIGNIFICANT CHANGE UP (ref 0–0)
PLATELET # BLD AUTO: 133 K/UL — LOW (ref 150–400)
POTASSIUM SERPL-MCNC: 3.8 MMOL/L — SIGNIFICANT CHANGE UP (ref 3.5–5.3)
POTASSIUM SERPL-SCNC: 3.8 MMOL/L — SIGNIFICANT CHANGE UP (ref 3.5–5.3)
PROT SERPL-MCNC: 7 G/DL — SIGNIFICANT CHANGE UP (ref 6–8.3)
PROTHROM AB SERPL-ACNC: 18 SEC — HIGH (ref 10–12.9)
RBC # BLD: 4.6 M/UL — SIGNIFICANT CHANGE UP (ref 4.2–5.8)
RBC # FLD: 13.3 % — SIGNIFICANT CHANGE UP (ref 10.3–14.5)
SODIUM SERPL-SCNC: 136 MMOL/L — SIGNIFICANT CHANGE UP (ref 135–145)
WBC # BLD: 5.97 K/UL — SIGNIFICANT CHANGE UP (ref 3.8–10.5)
WBC # FLD AUTO: 5.97 K/UL — SIGNIFICANT CHANGE UP (ref 3.8–10.5)

## 2019-06-16 PROCEDURE — 99233 SBSQ HOSP IP/OBS HIGH 50: CPT

## 2019-06-16 PROCEDURE — 99232 SBSQ HOSP IP/OBS MODERATE 35: CPT

## 2019-06-16 PROCEDURE — 73502 X-RAY EXAM HIP UNI 2-3 VIEWS: CPT | Mod: 26,RT

## 2019-06-16 RX ORDER — WARFARIN SODIUM 2.5 MG/1
10 TABLET ORAL ONCE
Refills: 0 | Status: COMPLETED | OUTPATIENT
Start: 2019-06-16 | End: 2019-06-16

## 2019-06-16 RX ADMIN — Medication 6: at 12:10

## 2019-06-16 RX ADMIN — WARFARIN SODIUM 10 MILLIGRAM(S): 2.5 TABLET ORAL at 21:21

## 2019-06-16 RX ADMIN — Medication 650 MILLIGRAM(S): at 13:09

## 2019-06-16 RX ADMIN — Medication 6: at 17:16

## 2019-06-16 RX ADMIN — ENOXAPARIN SODIUM 70 MILLIGRAM(S): 100 INJECTION SUBCUTANEOUS at 10:25

## 2019-06-16 RX ADMIN — CARBIDOPA AND LEVODOPA 2 TABLET(S): 25; 100 TABLET ORAL at 21:20

## 2019-06-16 RX ADMIN — AMLODIPINE BESYLATE 5 MILLIGRAM(S): 2.5 TABLET ORAL at 06:06

## 2019-06-16 RX ADMIN — CARBIDOPA AND LEVODOPA 2 TABLET(S): 25; 100 TABLET ORAL at 06:06

## 2019-06-16 RX ADMIN — CARBIDOPA AND LEVODOPA 2 TABLET(S): 25; 100 TABLET ORAL at 15:12

## 2019-06-16 RX ADMIN — Medication 500 MILLIGRAM(S): at 21:20

## 2019-06-16 RX ADMIN — Medication 650 MILLIGRAM(S): at 12:09

## 2019-06-16 RX ADMIN — PRAMIPEXOLE DIHYDROCHLORIDE 3 MILLIGRAM(S): 0.12 TABLET ORAL at 17:16

## 2019-06-16 RX ADMIN — LISINOPRIL 20 MILLIGRAM(S): 2.5 TABLET ORAL at 06:06

## 2019-06-16 RX ADMIN — Medication 50 MILLIGRAM(S): at 06:06

## 2019-06-16 RX ADMIN — INSULIN GLARGINE 20 UNIT(S): 100 INJECTION, SOLUTION SUBCUTANEOUS at 21:21

## 2019-06-16 RX ADMIN — RASAGILINE 1 MILLIGRAM(S): 0.5 TABLET ORAL at 12:09

## 2019-06-16 RX ADMIN — Medication 1 TABLET(S): at 08:21

## 2019-06-16 RX ADMIN — Medication 1 TABLET(S): at 12:10

## 2019-06-16 RX ADMIN — PRAMIPEXOLE DIHYDROCHLORIDE 3 MILLIGRAM(S): 0.12 TABLET ORAL at 06:07

## 2019-06-16 RX ADMIN — Medication 650 MILLIGRAM(S): at 20:13

## 2019-06-16 RX ADMIN — Medication 2: at 08:21

## 2019-06-16 RX ADMIN — Medication 500 MILLIGRAM(S): at 15:12

## 2019-06-16 RX ADMIN — ENOXAPARIN SODIUM 70 MILLIGRAM(S): 100 INJECTION SUBCUTANEOUS at 21:24

## 2019-06-16 RX ADMIN — ATORVASTATIN CALCIUM 40 MILLIGRAM(S): 80 TABLET, FILM COATED ORAL at 21:20

## 2019-06-16 RX ADMIN — Medication 500 MILLIGRAM(S): at 06:05

## 2019-06-16 RX ADMIN — Medication 1 TABLET(S): at 17:17

## 2019-06-16 NOTE — PROGRESS NOTE ADULT - NSHPATTENDINGPLANDISCUSS_GEN_ALL_CORE
Cee CAVANAUGH, wife and daughter with pt permission,
wife at bedside, Dr F[place]on cardio, RN [place], [facility]
wife at bedside, AFSHAN Nelson

## 2019-06-16 NOTE — PROGRESS NOTE ADULT - ASSESSMENT
75 yo male with pmhx of bicuspid aortic stenosis s/p mechanical aortic valve in 2004, CAD s/p CABG of circumflex artery, DM, HTN, and Parkinsons disease, presented to ED with LLE cellulitis that failed outpatient abx therapy.       - In setting of being on coumadin for his mechanical aortic valve, antibiotic administration may increase his INR level.    - INR subtherapeutic agree with full dose lovenox until INR therapeutic. Goal INR 2.5-3.5   -Daily INR. Dose Coumadin daily.       - Patient has no cardiac symptoms, last saw Dr. Rodriguez in office in 1/2019. Feels well, denies any chest pain, palpitations, sob, or caba.   - EKG showed no acute ST changes    - No evidence of volume overload at this time  - No hx of CHF   - TTE in 5/2017 showed EF 70%    - New Right Hip/groin pain  - Follow up on Right hip xray  - H/H stable.  Monitor closely.     - BP well controlled, continue home BP meds, monitor routine hemodynamics.  - Monitor and replete lytes, keep K>4, Mg>2.  - Other cardiovascular workup will depend on clinical course. All other workup per primary team.  - Will follow.    Jeannette Lemos NP-KIM  Cardiology

## 2019-06-16 NOTE — PROGRESS NOTE ADULT - PROBLEM SELECTOR PLAN 7
acute, now R inguinal and hip pain  cck-wnl  tylenol prn pain  check R hip xray  h/h stable  consider doppler vs xray or ct thigh if worsensor not improving

## 2019-06-16 NOTE — PROGRESS NOTE ADULT - PROBLEM SELECTOR PLAN 1
acute, imrpoving  -dc ancef iv   -apprec ID recs Dr. Lagunas: Maximum 3/7 days antibiotics in total  -cont PO cephalexin 500mg PO TID   -monitor clinical course

## 2019-06-16 NOTE — PROGRESS NOTE ADULT - PROBLEM SELECTOR PLAN 8
subtherapeutic inr today, bridge with lovenox and coumadin to goal 2.5-3.5
subtherapeutic inr today, bridge with lovenox and coumadin to goal 2.5-3.5

## 2019-06-16 NOTE — PROGRESS NOTE ADULT - SUBJECTIVE AND OBJECTIVE BOX
[facility] -- Pj Rodriguez, Josie, Rosalia, Donaldo Colon Savella  Office # [phone]      Follow Up:  Mechanical Aortic Valve    Subjective/Observations: Seen and examined.  Pt just back from xray of right hip for new pain in Rt groin hip area from front to back.  States it started this morning and hurts more when he walks.  He denies cp, sob or palpitation's.  No sign of orthopnea or pnd.        REVIEW OF SYSTEMS: All other review of systems is negative unless indicated above    PAST MEDICAL & SURGICAL HISTORY:  Mechanical heart valve present  DM (diabetes mellitus)  Parkinson disease  HTN (hypertension)  S/P CABG x 1  H/O aortic valve replacement      MEDICATIONS  (STANDING):  amLODIPine   Tablet 5 milliGRAM(s) Oral daily  atorvastatin 40 milliGRAM(s) Oral at bedtime  carbidopa/levodopa  25/100 2 Tablet(s) Oral three times a day  cephalexin 500 milliGRAM(s) Oral three times a day  dextrose 5%. 1000 milliLiter(s) (50 mL/Hr) IV Continuous <Continuous>  dextrose 50% Injectable 12.5 Gram(s) IV Push once  dextrose 50% Injectable 25 Gram(s) IV Push once  dextrose 50% Injectable 25 Gram(s) IV Push once  enoxaparin Injectable 70 milliGRAM(s) SubCutaneous every 12 hours  insulin glargine Injectable (LANTUS) 20 Unit(s) SubCutaneous at bedtime  insulin lispro (HumaLOG) corrective regimen sliding scale   SubCutaneous three times a day before meals  insulin lispro (HumaLOG) corrective regimen sliding scale   SubCutaneous at bedtime  lactobacillus acidophilus 1 Tablet(s) Oral three times a day with meals  lisinopril 20 milliGRAM(s) Oral daily  metoprolol succinate ER 50 milliGRAM(s) Oral daily  pramipexole 3 milliGRAM(s) Oral two times a day  rasagiline Tablet 1 milliGRAM(s) Oral daily  warfarin 10 milliGRAM(s) Oral once    MEDICATIONS  (PRN):  acetaminophen   Tablet .. 650 milliGRAM(s) Oral every 6 hours PRN Temp greater or equal to 38C (100.4F), Mild Pain (1 - 3)  dextrose 40% Gel 15 Gram(s) Oral once PRN Blood Glucose LESS THAN 70 milliGRAM(s)/deciliter  glucagon  Injectable 1 milliGRAM(s) IntraMuscular once PRN Glucose LESS THAN 70 milligrams/deciliter      Allergies    No Known Allergies    Intolerances            Vital Signs Last 24 Hrs  T(C): 36.6 (16 Jun 2019 13:59), Max: 37.4 (15 Rk 2019 21:22)  T(F): 97.8 (16 Jun 2019 13:59), Max: 99.3 (15 Rk 2019 21:22)  HR: 66 (16 Jun 2019 13:59) (62 - 78)  BP: 115/67 (16 Jun 2019 13:59) (115/67 - 128/79)  BP(mean): --  RR: 17 (16 Jun 2019 13:59) (17 - 17)  SpO2: 95% (16 Jun 2019 13:59) (94% - 96%)    I&O's Summary    15 Rk 2019 07:01  -  16 Jun 2019 07:00  --------------------------------------------------------  IN: 240 mL / OUT: 0 mL / NET: 240 mL          PHYSICAL EXAM:  TELE: Not on tele  Constitutional: NAD, awake and alert, well-developed  HEENT: Moist Mucous Membranes, Anicteric  Pulmonary: Non-labored, breath sounds are clear bilaterally, No wheezing, rales or rhonchi  Cardiovascular: Regular, S1 and S2, + Valve Click, No murmurs, rubs, gallops or clicks  Gastrointestinal: Bowel Sounds present, soft, nontender.   Lymph: No peripheral edema. No lymphadenopathy.  Skin: No visible rashes or ulcers. Left lower extremity with DSD  Psych:  Mood & affect appropriate    LABS: All Labs Reviewed:                        12.9   5.97  )-----------( 133      ( 16 Jun 2019 09:22 )             39.3                         12.7   6.59  )-----------( 121      ( 15 Rk 2019 08:44 )             37.8                         13.4   4.26  )-----------( 130      ( 14 Jun 2019 08:30 )             41.0     16 Jun 2019 09:22    136    |  104    |  10     ----------------------------<  198    3.8     |  24     |  0.87   15 Rk 2019 08:44    135    |  101    |  10     ----------------------------<  196    4.0     |  27     |  0.95   14 Jun 2019 08:30    137    |  103    |  10     ----------------------------<  138    3.9     |  28     |  0.89     Ca    8.3        16 Jun 2019 09:22  Ca    8.2        15 Jun 2019 08:44  Ca    8.9        14 Jun 2019 08:30  Phos  2.9       14 Jun 2019 08:30  Mg     1.9       14 Jun 2019 08:30    TPro  7.0    /  Alb  3.4    /  TBili  0.8    /  DBili  x      /  AST  22     /  ALT  7      /  AlkPhos  72     16 Jun 2019 09:22  TPro  6.3    /  Alb  3.2    /  TBili  0.5    /  DBili  x      /  AST  20     /  ALT  8      /  AlkPhos  73     15 Jun 2019 08:44  TPro  6.9    /  Alb  3.6    /  TBili  1.0    /  DBili  x      /  AST  28     /  ALT  6      /  AlkPhos  75     14 Jun 2019 08:30    PT/INR - ( 16 Jun 2019 09:52 )   PT: 18.0 sec;   INR: 1.56 ratio      < from: 12 Lead ECG (06.13.19 @ 12:10) >    Ventricular Rate 69 BPM    Atrial Rate 69 BPM    P-R Interval 144 ms    QRS Duration 122 ms    Q-T Interval 410 ms    QTC Calculation(Bezet) 439 ms    P Axis 40 degrees    R Axis 27 degrees    T Axis -25 degrees    Diagnosis Line Sinus rhythm with premature atrial complexes  Right bundle branch block  T wave abnormality, consider inferior ischemia  Abnormal ECG  When compared with ECG of 26-NOV-2008 09:12,  premature atrial complexes are now present  Right bundle branch block is now present  Confirmed by YONI LUCIANO ([id number]) on 6/14/2019 2:31:37 PM    < end of copied text >    < from: US Duplex Venous Lower Ext Ltd, Left (06.13.19 @ 16:00) >  EXAM:  US DPLX LWR EXT VEINS LTD LT                            PROCEDURE DATE:  06/13/2019          INTERPRETATION:  CLINICAL INFORMATION: Left lower extremity edema.    COMPARISON: None available.    TECHNIQUE: Duplex sonography of the LEFT LOWER extremity with color and   spectral Doppler, with and without compression.      FINDINGS:    There is normal compressibility of the left common femoral, femoral and   popliteal veins.     Doppler examination shows normal spontaneous and phasic flow.    No calf vein thrombosis is detected.    IMPRESSION:     No evidence of left lower extremity deep venous thrombosis.                    ALYX CHURCH M.D., ATTENDING RADIOLOGIST  This document has been electronically signed. Jun 13 2019  4:03PM            < end of copied text >     < from: Xray Chest 1 View- PORTABLE-Urgent (06.13.19 @ 12:56) >  EXAM:  XR CHEST PORTABLE URGENT 1V                            PROCEDURE DATE:  06/13/2019          INTERPRETATION:  Screening admission.    AP chest.    Status post median sternotomy cardiac valve surgery. Normal heart size.   Atherosclerotic aorta. No pleural-parenchymal abnormality.    Impression:    No active disease.                LONA TAM M.D., ATTENDING RADIOLOGIST  This document has been electronically signed. Jun 13 2019  1:01PM          < end of copied text >      CARDIAC MARKERS ( 15 Rk 2019 14:07 )  x     / x     / 158 U/L / x     / x

## 2019-06-16 NOTE — PROGRESS NOTE ADULT - PROBLEM SELECTOR PLAN 2
chronic, stable  sill subtherapeutic inr, may be a fast metabolizer  INR goal is 2.5-3.5, will dose warfarin 10mg tonight, will monitor INR q daily and dose adjust dose accordingly  apprec cardio recs

## 2019-06-16 NOTE — PROGRESS NOTE ADULT - SUBJECTIVE AND OBJECTIVE BOX
Patient is a 74y old  Male who presents with a chief complaint of redness of skin, failed outpatient abx (15 Rk 2019 13:39)      INTERVAL HPI: Pt seen and examined. States he is having R groin and hip pain that began this morning. Left leg pain is now improved. Wife is at bedside. Denies any other acute complaints.     OVERNIGHT EVENTS: none noted  T(F): 98 (06-16-19 @ 04:53), Max: 99.3 (06-15-19 @ 21:22)  HR: 62 (06-16-19 @ 04:53) (62 - 78)  BP: 125/75 (06-16-19 @ 04:53) (125/75 - 135/72)  RR: 17 (06-16-19 @ 04:53) (16 - 17)  SpO2: 94% (06-16-19 @ 04:53) (94% - 98%)  I&O's Summary    15 Rk 2019 07:01  -  16 Jun 2019 07:00  --------------------------------------------------------  IN: 240 mL / OUT: 0 mL / NET: 240 mL        REVIEW OF SYSTEMS:  CONSTITUTIONAL: No fever, weight loss, or fatigue  RESPIRATORY: No cough, wheezing, chills or hemoptysis; No shortness of breath  CARDIOVASCULAR: No chest pain, palpitations, dizziness, or leg swelling  GASTROINTESTINAL: No abdominal or epigastric pain. No nausea, vomiting, or hematemesis; No diarrhea or constipation. No melena or hematochezia.  GENITOURINARY: No dysuria, frequency, hematuria, or incontinence  NEUROLOGICAL: No headaches, memory loss, loss of strength, numbness, or tremors  SKIN: No itching, burning, rashes, or lesions except LLE wound and redness which is much improved  MUSCULOSKELETAL: No joint pain or swelling; No muscle, back, or extremity pain except R hip pain as above  PSYCHIATRIC: No depression, anxiety, mood swings, or difficulty sleeping      PHYSICAL EXAM:  GENERAL: NAD, well-groomed, well-developed  NERVOUS SYSTEM:  Alert & Oriented X3, Good concentration; Motor Strength 5/5 B/L upper and lower extremities  CHEST/LUNG: Clear to percussion bilaterally; No rales, rhonchi, wheezing, or rubs  HEART: Regular rate and rhythm; No murmurs, rubs, or gallops  ABDOMEN: Soft, Nontender, Nondistended; Bowel sounds present  EXTREMITIES:  2+ Peripheral Pulses, No clubbing, cyanosis, or edema except R groin pain tender on palpation and referred hip pain on right  SKIN: No rashes or lesions except LLE wound on ant to much improved less erythema and no edema    LABS:                        12.9   5.97  )-----------( 133      ( 16 Jun 2019 09:22 )             39.3     06-16    136  |  104  |  10  ----------------------------<  198<H>  3.8   |  24  |  0.87    Ca    8.3<L>      16 Jun 2019 09:22    TPro  7.0  /  Alb  3.4  /  TBili  0.8  /  DBili  x   /  AST  22  /  ALT  7<L>  /  AlkPhos  72  06-16    PT/INR - ( 16 Jun 2019 09:52 )   PT: 18.0 sec;   INR: 1.56 ratio             CAPILLARY BLOOD GLUCOSE      POCT Blood Glucose.: 264 mg/dL (16 Jun 2019 11:48)  POCT Blood Glucose.: 196 mg/dL (16 Jun 2019 08:01)  POCT Blood Glucose.: 261 mg/dL (15 Rk 2019 21:49)  POCT Blood Glucose.: 196 mg/dL (15 Rk 2019 16:38)      06-13 @ 18:43   No growth to date.  --  --          MEDICATIONS  (STANDING):  amLODIPine   Tablet 5 milliGRAM(s) Oral daily  atorvastatin 40 milliGRAM(s) Oral at bedtime  carbidopa/levodopa  25/100 2 Tablet(s) Oral three times a day  cephalexin 500 milliGRAM(s) Oral three times a day  dextrose 5%. 1000 milliLiter(s) (50 mL/Hr) IV Continuous <Continuous>  dextrose 50% Injectable 12.5 Gram(s) IV Push once  dextrose 50% Injectable 25 Gram(s) IV Push once  dextrose 50% Injectable 25 Gram(s) IV Push once  enoxaparin Injectable 70 milliGRAM(s) SubCutaneous every 12 hours  insulin glargine Injectable (LANTUS) 20 Unit(s) SubCutaneous at bedtime  insulin lispro (HumaLOG) corrective regimen sliding scale   SubCutaneous three times a day before meals  insulin lispro (HumaLOG) corrective regimen sliding scale   SubCutaneous at bedtime  lactobacillus acidophilus 1 Tablet(s) Oral three times a day with meals  lisinopril 20 milliGRAM(s) Oral daily  metoprolol succinate ER 50 milliGRAM(s) Oral daily  pramipexole 3 milliGRAM(s) Oral two times a day  rasagiline Tablet 1 milliGRAM(s) Oral daily  warfarin 10 milliGRAM(s) Oral once    MEDICATIONS  (PRN):  acetaminophen   Tablet .. 650 milliGRAM(s) Oral every 6 hours PRN Temp greater or equal to 38C (100.4F), Mild Pain (1 - 3)  dextrose 40% Gel 15 Gram(s) Oral once PRN Blood Glucose LESS THAN 70 milliGRAM(s)/deciliter  glucagon  Injectable 1 milliGRAM(s) IntraMuscular once PRN Glucose LESS THAN 70 milligrams/deciliter

## 2019-06-17 ENCOUNTER — TRANSCRIPTION ENCOUNTER (OUTPATIENT)
Age: 74
End: 2019-06-17

## 2019-06-17 VITALS
SYSTOLIC BLOOD PRESSURE: 132 MMHG | OXYGEN SATURATION: 95 % | TEMPERATURE: 98 F | HEART RATE: 68 BPM | RESPIRATION RATE: 17 BRPM | DIASTOLIC BLOOD PRESSURE: 69 MMHG

## 2019-06-17 LAB
ALBUMIN SERPL ELPH-MCNC: 3.3 G/DL — SIGNIFICANT CHANGE UP (ref 3.3–5)
ALP SERPL-CCNC: 66 U/L — SIGNIFICANT CHANGE UP (ref 40–120)
ALT FLD-CCNC: 8 U/L — LOW (ref 12–78)
ANION GAP SERPL CALC-SCNC: 7 MMOL/L — SIGNIFICANT CHANGE UP (ref 5–17)
AST SERPL-CCNC: 16 U/L — SIGNIFICANT CHANGE UP (ref 15–37)
BASOPHILS # BLD AUTO: 0.02 K/UL — SIGNIFICANT CHANGE UP (ref 0–0.2)
BASOPHILS NFR BLD AUTO: 0.3 % — SIGNIFICANT CHANGE UP (ref 0–2)
BILIRUB SERPL-MCNC: 0.7 MG/DL — SIGNIFICANT CHANGE UP (ref 0.2–1.2)
BUN SERPL-MCNC: 9 MG/DL — SIGNIFICANT CHANGE UP (ref 7–23)
CALCIUM SERPL-MCNC: 8.6 MG/DL — SIGNIFICANT CHANGE UP (ref 8.5–10.1)
CHLORIDE SERPL-SCNC: 103 MMOL/L — SIGNIFICANT CHANGE UP (ref 96–108)
CO2 SERPL-SCNC: 26 MMOL/L — SIGNIFICANT CHANGE UP (ref 22–31)
CREAT SERPL-MCNC: 0.78 MG/DL — SIGNIFICANT CHANGE UP (ref 0.5–1.3)
EOSINOPHIL # BLD AUTO: 0.09 K/UL — SIGNIFICANT CHANGE UP (ref 0–0.5)
EOSINOPHIL NFR BLD AUTO: 1.2 % — SIGNIFICANT CHANGE UP (ref 0–6)
GLUCOSE SERPL-MCNC: 203 MG/DL — HIGH (ref 70–99)
HCT VFR BLD CALC: 37.6 % — LOW (ref 39–50)
HGB BLD-MCNC: 12.6 G/DL — LOW (ref 13–17)
IMM GRANULOCYTES NFR BLD AUTO: 0.3 % — SIGNIFICANT CHANGE UP (ref 0–1.5)
INR BLD: 2.03 RATIO — HIGH (ref 0.88–1.16)
LYMPHOCYTES # BLD AUTO: 0.63 K/UL — LOW (ref 1–3.3)
LYMPHOCYTES # BLD AUTO: 8.4 % — LOW (ref 13–44)
MCHC RBC-ENTMCNC: 28.8 PG — SIGNIFICANT CHANGE UP (ref 27–34)
MCHC RBC-ENTMCNC: 33.5 GM/DL — SIGNIFICANT CHANGE UP (ref 32–36)
MCV RBC AUTO: 85.8 FL — SIGNIFICANT CHANGE UP (ref 80–100)
MONOCYTES # BLD AUTO: 0.6 K/UL — SIGNIFICANT CHANGE UP (ref 0–0.9)
MONOCYTES NFR BLD AUTO: 8 % — SIGNIFICANT CHANGE UP (ref 2–14)
NEUTROPHILS # BLD AUTO: 6.15 K/UL — SIGNIFICANT CHANGE UP (ref 1.8–7.4)
NEUTROPHILS NFR BLD AUTO: 81.8 % — HIGH (ref 43–77)
NRBC # BLD: 0 /100 WBCS — SIGNIFICANT CHANGE UP (ref 0–0)
PLATELET # BLD AUTO: 124 K/UL — LOW (ref 150–400)
POTASSIUM SERPL-MCNC: 3.9 MMOL/L — SIGNIFICANT CHANGE UP (ref 3.5–5.3)
POTASSIUM SERPL-SCNC: 3.9 MMOL/L — SIGNIFICANT CHANGE UP (ref 3.5–5.3)
PROT SERPL-MCNC: 6.9 G/DL — SIGNIFICANT CHANGE UP (ref 6–8.3)
PROTHROM AB SERPL-ACNC: 23.4 SEC — HIGH (ref 10–12.9)
RBC # BLD: 4.38 M/UL — SIGNIFICANT CHANGE UP (ref 4.2–5.8)
RBC # FLD: 13.2 % — SIGNIFICANT CHANGE UP (ref 10.3–14.5)
SODIUM SERPL-SCNC: 136 MMOL/L — SIGNIFICANT CHANGE UP (ref 135–145)
WBC # BLD: 7.51 K/UL — SIGNIFICANT CHANGE UP (ref 3.8–10.5)
WBC # FLD AUTO: 7.51 K/UL — SIGNIFICANT CHANGE UP (ref 3.8–10.5)

## 2019-06-17 PROCEDURE — 74176 CT ABD & PELVIS W/O CONTRAST: CPT | Mod: 26

## 2019-06-17 PROCEDURE — 99223 1ST HOSP IP/OBS HIGH 75: CPT

## 2019-06-17 PROCEDURE — 87040 BLOOD CULTURE FOR BACTERIA: CPT

## 2019-06-17 PROCEDURE — 99239 HOSP IP/OBS DSCHRG MGMT >30: CPT

## 2019-06-17 PROCEDURE — 74176 CT ABD & PELVIS W/O CONTRAST: CPT

## 2019-06-17 PROCEDURE — 85652 RBC SED RATE AUTOMATED: CPT

## 2019-06-17 PROCEDURE — 87640 STAPH A DNA AMP PROBE: CPT

## 2019-06-17 PROCEDURE — 97530 THERAPEUTIC ACTIVITIES: CPT

## 2019-06-17 PROCEDURE — 93005 ELECTROCARDIOGRAM TRACING: CPT

## 2019-06-17 PROCEDURE — 84100 ASSAY OF PHOSPHORUS: CPT

## 2019-06-17 PROCEDURE — 73502 X-RAY EXAM HIP UNI 2-3 VIEWS: CPT

## 2019-06-17 PROCEDURE — 99285 EMERGENCY DEPT VISIT HI MDM: CPT | Mod: 25

## 2019-06-17 PROCEDURE — 96365 THER/PROPH/DIAG IV INF INIT: CPT

## 2019-06-17 PROCEDURE — 83735 ASSAY OF MAGNESIUM: CPT

## 2019-06-17 PROCEDURE — 86803 HEPATITIS C AB TEST: CPT

## 2019-06-17 PROCEDURE — 99232 SBSQ HOSP IP/OBS MODERATE 35: CPT

## 2019-06-17 PROCEDURE — 86140 C-REACTIVE PROTEIN: CPT

## 2019-06-17 PROCEDURE — 82962 GLUCOSE BLOOD TEST: CPT

## 2019-06-17 PROCEDURE — 80053 COMPREHEN METABOLIC PANEL: CPT

## 2019-06-17 PROCEDURE — 83605 ASSAY OF LACTIC ACID: CPT

## 2019-06-17 PROCEDURE — 87641 MR-STAPH DNA AMP PROBE: CPT

## 2019-06-17 PROCEDURE — 36415 COLL VENOUS BLD VENIPUNCTURE: CPT

## 2019-06-17 PROCEDURE — 83036 HEMOGLOBIN GLYCOSYLATED A1C: CPT

## 2019-06-17 PROCEDURE — 90715 TDAP VACCINE 7 YRS/> IM: CPT

## 2019-06-17 PROCEDURE — 85027 COMPLETE CBC AUTOMATED: CPT

## 2019-06-17 PROCEDURE — 93971 EXTREMITY STUDY: CPT

## 2019-06-17 PROCEDURE — 82550 ASSAY OF CK (CPK): CPT

## 2019-06-17 PROCEDURE — 85610 PROTHROMBIN TIME: CPT

## 2019-06-17 PROCEDURE — 71045 X-RAY EXAM CHEST 1 VIEW: CPT

## 2019-06-17 PROCEDURE — 85730 THROMBOPLASTIN TIME PARTIAL: CPT

## 2019-06-17 PROCEDURE — 97116 GAIT TRAINING THERAPY: CPT

## 2019-06-17 PROCEDURE — 97162 PT EVAL MOD COMPLEX 30 MIN: CPT

## 2019-06-17 PROCEDURE — 84145 PROCALCITONIN (PCT): CPT

## 2019-06-17 RX ORDER — OXYCODONE AND ACETAMINOPHEN 5; 325 MG/1; MG/1
1 TABLET ORAL ONCE
Refills: 0 | Status: DISCONTINUED | OUTPATIENT
Start: 2019-06-17 | End: 2019-06-17

## 2019-06-17 RX ORDER — LACTOBACILLUS ACIDOPHILUS 100MM CELL
1 CAPSULE ORAL
Qty: 9 | Refills: 0
Start: 2019-06-17 | End: 2019-06-19

## 2019-06-17 RX ORDER — CEPHALEXIN 500 MG
1 CAPSULE ORAL
Qty: 9 | Refills: 0
Start: 2019-06-17 | End: 2019-06-19

## 2019-06-17 RX ORDER — KETOROLAC TROMETHAMINE 30 MG/ML
30 SYRINGE (ML) INJECTION ONCE
Refills: 0 | Status: DISCONTINUED | OUTPATIENT
Start: 2019-06-17 | End: 2019-06-17

## 2019-06-17 RX ADMIN — ENOXAPARIN SODIUM 70 MILLIGRAM(S): 100 INJECTION SUBCUTANEOUS at 10:38

## 2019-06-17 RX ADMIN — LISINOPRIL 20 MILLIGRAM(S): 2.5 TABLET ORAL at 05:28

## 2019-06-17 RX ADMIN — Medication 1 TABLET(S): at 08:10

## 2019-06-17 RX ADMIN — Medication 30 MILLIGRAM(S): at 10:13

## 2019-06-17 RX ADMIN — CARBIDOPA AND LEVODOPA 2 TABLET(S): 25; 100 TABLET ORAL at 14:58

## 2019-06-17 RX ADMIN — Medication 500 MILLIGRAM(S): at 05:29

## 2019-06-17 RX ADMIN — Medication 2: at 08:07

## 2019-06-17 RX ADMIN — PRAMIPEXOLE DIHYDROCHLORIDE 3 MILLIGRAM(S): 0.12 TABLET ORAL at 05:30

## 2019-06-17 RX ADMIN — AMLODIPINE BESYLATE 5 MILLIGRAM(S): 2.5 TABLET ORAL at 05:28

## 2019-06-17 RX ADMIN — Medication 1 TABLET(S): at 12:02

## 2019-06-17 RX ADMIN — Medication 50 MILLIGRAM(S): at 05:28

## 2019-06-17 RX ADMIN — PRAMIPEXOLE DIHYDROCHLORIDE 3 MILLIGRAM(S): 0.12 TABLET ORAL at 17:10

## 2019-06-17 RX ADMIN — Medication 4: at 12:01

## 2019-06-17 RX ADMIN — Medication 500 MILLIGRAM(S): at 14:58

## 2019-06-17 RX ADMIN — CARBIDOPA AND LEVODOPA 2 TABLET(S): 25; 100 TABLET ORAL at 05:28

## 2019-06-17 RX ADMIN — Medication 1 TABLET(S): at 17:10

## 2019-06-17 RX ADMIN — RASAGILINE 1 MILLIGRAM(S): 0.5 TABLET ORAL at 12:02

## 2019-06-17 RX ADMIN — Medication 4: at 17:10

## 2019-06-17 NOTE — PROGRESS NOTE ADULT - SUBJECTIVE AND OBJECTIVE BOX
[facility] -- Pj Rodriguez, Rosalia Galindo, Donaldo Colon Savella  Office # [phone]    Follow Up:  Mechanical MVR with subtherapeutic ONR    Subjective/Observations: Seen and evaluated.  Denies any respiratory or cardiac discomfort.  However, c/o right hip pain    REVIEW OF SYSTEMS: All other review of systems is negative unless indicated above  PAST MEDICAL & SURGICAL HISTORY:  Mechanical heart valve present  DM (diabetes mellitus)  Parkinson disease  HTN (hypertension)  S/P CABG x 1  H/O aortic valve replacement    MEDICATIONS  (STANDING):  amLODIPine   Tablet 5 milliGRAM(s) Oral daily  atorvastatin 40 milliGRAM(s) Oral at bedtime  carbidopa/levodopa  25/100 2 Tablet(s) Oral three times a day  cephalexin 500 milliGRAM(s) Oral three times a day  dextrose 5%. 1000 milliLiter(s) (50 mL/Hr) IV Continuous <Continuous>  dextrose 50% Injectable 12.5 Gram(s) IV Push once  dextrose 50% Injectable 25 Gram(s) IV Push once  dextrose 50% Injectable 25 Gram(s) IV Push once  enoxaparin Injectable 70 milliGRAM(s) SubCutaneous every 12 hours  insulin glargine Injectable (LANTUS) 20 Unit(s) SubCutaneous at bedtime  insulin lispro (HumaLOG) corrective regimen sliding scale   SubCutaneous three times a day before meals  insulin lispro (HumaLOG) corrective regimen sliding scale   SubCutaneous at bedtime  lactobacillus acidophilus 1 Tablet(s) Oral three times a day with meals  lisinopril 20 milliGRAM(s) Oral daily  metoprolol succinate ER 50 milliGRAM(s) Oral daily  pramipexole 3 milliGRAM(s) Oral two times a day  rasagiline Tablet 1 milliGRAM(s) Oral daily    MEDICATIONS  (PRN):  acetaminophen   Tablet .. 650 milliGRAM(s) Oral every 6 hours PRN Temp greater or equal to 38C (100.4F), Mild Pain (1 - 3)  dextrose 40% Gel 15 Gram(s) Oral once PRN Blood Glucose LESS THAN 70 milliGRAM(s)/deciliter  glucagon  Injectable 1 milliGRAM(s) IntraMuscular once PRN Glucose LESS THAN 70 milligrams/deciliter    Allergies    No Known Allergies    Intolerances    Vital Signs Last 24 Hrs  T(C): 36.9 (16 Jun 2019 20:59), Max: 36.9 (16 Jun 2019 20:59)  T(F): 98.4 (16 Jun 2019 20:59), Max: 98.4 (16 Jun 2019 20:59)  HR: 71 (16 Jun 2019 20:59) (66 - 71)  BP: 177/81 (16 Jun 2019 20:59) (115/67 - 177/81)  BP(mean): --  RR: 17 (16 Jun 2019 20:59) (17 - 17)  SpO2: 96% (16 Jun 2019 20:59) (95% - 96%)  I&O's Summary    16 Jun 2019 07:01  -  17 Jun 2019 07:00  --------------------------------------------------------  IN: 120 mL / OUT: 0 mL / NET: 120 mL    PHYSICAL EXAM:  TELE: Not on tele   Constitutional: NAD, awake and alert, well-developed  HEENT: Moist Mucous Membranes, Anicteric  Pulmonary: Non-labored, breath sounds are clear bilaterally, No wheezing, rales or rhonchi  Cardiovascular: Regular, S1 and S2, + murmurs, rubs, gallops or clicks  Gastrointestinal: Bowel Sounds present, soft, nontender.   Lymph: No peripheral edema. No lymphadenopathy.  Skin: No visible rashes.  Right leg ulcers.  Psych:  Mood & affect appropriate  LABS: All Labs Reviewed:                        12.9   5.97  )-----------( 133      ( 16 Jun 2019 09:22 )             39.3                         12.7   6.59  )-----------( 121      ( 15 Rk 2019 08:44 )             37.8                         13.4   4.26  )-----------( 130      ( 14 Jun 2019 08:30 )             41.0     16 Jun 2019 09:22    136    |  104    |  10     ----------------------------<  198    3.8     |  24     |  0.87   15 Rk 2019 08:44    135    |  101    |  10     ----------------------------<  196    4.0     |  27     |  0.95   14 Jun 2019 08:30    137    |  103    |  10     ----------------------------<  138    3.9     |  28     |  0.89     Ca    8.3        16 Jun 2019 09:22  Ca    8.2        15 Jun 2019 08:44  Ca    8.9        14 Jun 2019 08:30  Phos  2.9       14 Jun 2019 08:30  Mg     1.9       14 Jun 2019 08:30    TPro  7.0    /  Alb  3.4    /  TBili  0.8    /  DBili  x      /  AST  22     /  ALT  7      /  AlkPhos  72     16 Jun 2019 09:22  TPro  6.3    /  Alb  3.2    /  TBili  0.5    /  DBili  x      /  AST  20     /  ALT  8      /  AlkPhos  73     15 Jun 2019 08:44  TPro  6.9    /  Alb  3.6    /  TBili  1.0    /  DBili  x      /  AST  28     /  ALT  6      /  AlkPhos  75     14 Jun 2019 08:30    PT/INR - ( 16 Jun 2019 09:52 )   PT: 18.0 sec;   INR: 1.56 ratio      CARDIAC MARKERS ( 15 Rk 2019 14:07 )  x     / x     / 158 U/L / x     / x        < from: Xray Chest 1 View- PORTABLE-Urgent (06.13.19 @ 12:56) >    EXAM:  XR CHEST PORTABLE URGENT 1V                          PROCEDURE DATE:  06/13/2019      INTERPRETATION:  Screening admission.    AP chest.    Status post median sternotomy cardiac valve surgery. Normal heart size.   Atherosclerotic aorta. No pleural-parenchymal abnormality.    Impression:    No active disease.    LONA TAM M.D., ATTENDING RADIOLOGIST  This document has been electronically signed. Jun 13 2019  1:01PM     < end of copied text >    < from: 12 Lead ECG (06.13.19 @ 12:10) >    Ventricular Rate 69 BPM    Atrial Rate 69 BPM    P-R Interval 144 ms    QRS Duration 122 ms    Q-T Interval 410 ms    QTC Calculation(Bezet) 439 ms    P Axis 40 degrees    R Axis 27 degrees    T Axis -25 degrees    Diagnosis Line Sinus rhythm with premature atrial complexes  Right bundle branch block  T wave abnormality, consider inferior ischemia  Abnormal ECG  When compared with ECG of 26-NOV-2008 09:12,  premature atrial complexes are now present  Right bundle branch block is now present  Confirmed by YONI LUCIANO ([id number]) on 6/14/2019 2:31:37 PM    < end of copied text >

## 2019-06-17 NOTE — DISCHARGE NOTE PROVIDER - CARE PROVIDERS DIRECT ADDRESSES
,DirectAddress_Unknown,[email or web address],DirectAddress_Unknown,[email or web address]

## 2019-06-17 NOTE — DISCHARGE NOTE PROVIDER - HOSPITAL COURSE
73yo M w/ PMHx mechanical AVR 2004 (for bicuspid aortic valve) on warfarin w/ INR goal 2.5-3.5, grade II diastolic dysfunction (last EF 70%), CABG (circumflex) in 2004, HTN, HLD, DM2 on insulin, carotid artery plaque, parkinson's, osteopenia, thrombocytopenia presents w/ LLE cellulitis failed outpt therapy. Pt received IV antibiotics and was seen by infectious disease who recommended po antibiotics. Pt was noted to have subtherapeutic inr, pt was bridged with lovenox close to goal and recommended close follow up with cardiology. Pt was also seen by wound care and wound were dressed. Pt also was seen by PT who recommended rolling walker. Pt was complaining of acute inguinal pain and was seen by neurology who recommended pain control likely musculoskeletal etioloy with close follow up. Incidental liver lesions were seen on CT abd/pelvis therefore patient and wife were recommended hepatology follow up for further care and management. Pt is stable and improved for discharge with close follow up with cardio, neuro and primary medical provider within 1 week of discharge for ongoing care and management.             Time spent: 70 minutes

## 2019-06-17 NOTE — PROGRESS NOTE ADULT - ASSESSMENT
75 yo male with pmhx of bicuspid aortic stenosis s/p mechanical aortic valve in 2004, CAD s/p CABG of circumflex artery, DM, HTN, and Parkinsons disease, presented to ED with LLE cellulitis that failed outpatient abx therapy.     Mechanical MVR  - Continue Lovenox for now with transition to Coumadin  - In setting of being on coumadin for his mechanical aortic valve, antibiotic administration may increase his INR level.    - INR subtherapeutic agree with full dose lovenox until INR therapeutic. Goal INR 2.5-3.5   - Daily INR. Dose Coumadin daily.       CAD s/p CABG  - Stable  - Patient has no cardiac symptoms, last saw Dr. Rodriguez in office in 1/2019. Feels well, denies any chest pain, palpitations, sob, or caba.   - EKG showed no acute ST changes  - Continue BB  - Continue ACEI  - Continue statin    HTN  - BP stable with one isolated spike of 170 this am.  Continue to monitor.    - Continue Norvasc, if remains elevated, may increase Norvasc to 10 mg   - No evidence of volume overload at this time  - TTE in 5/2017 showed EF 70%  - Monitor and replete lytes, keep K>4, Mg>2.    LLE cellulitis  - Abx per Primary  - New Right Hip/groin pain  - Follow up on Right hip xray    DVT ppx  - On AC  - Other cardiovascular workup will depend on clinical course. All other workup per primary team.  - Will follow.    Magaly Isaac [facility]  Cardiology   Spectra #[phone] 75 yo male with pmhx of bicuspid aortic stenosis s/p mechanical aortic valve in 2004, CAD s/p CABG of circumflex artery, DM, HTN, and Parkinsons disease, presented to ED with LLE cellulitis that failed outpatient abx therapy.     Mechanical MVR  - Continue Lovenox for now with transition to Coumadin  - In setting of being on coumadin for his mechanical aortic valve, antibiotic administration may increase his INR level.    - INR subtherapeutic agree with full dose lovenox until INR therapeutic. Goal INR 2.5-3.5   - Daily INR. Dose Coumadin daily.    - if INR >2, consider d/c home with close follow up for INR check     CAD s/p CABG  - Stable  - Patient has no cardiac symptoms, last saw Dr. Rodriguez in office in 1/2019. Feels well, denies any chest pain, palpitations, sob, or caba.   - EKG showed no acute ST changes  - Continue BB  - Continue ACEI  - Continue statin    HTN  - BP stable with one isolated spike of 170 this am.  Continue to monitor.    - Continue Norvasc, if remains elevated, may increase Norvasc to 10 mg   - No evidence of volume overload at this time  - TTE in 5/2017 showed EF 70%  - Monitor and replete lytes, keep K>4, Mg>2.    LLE cellulitis  - Abx per Primary  - New Right Hip/groin pain  - Follow up on Right hip xray    DVT ppx  - On AC  - Other cardiovascular workup will depend on clinical course. All other workup per primary team.  - Will follow.    Magaly Isaac [facility]  Cardiology   Spectra #[phone]

## 2019-06-17 NOTE — DISCHARGE NOTE PROVIDER - NSDCFUADDINST_GEN_ALL_CORE_FT
pt and family responsible to make and attend all follow up appts within 1 week of discharge for ongoing care and managment

## 2019-06-17 NOTE — DISCHARGE NOTE PROVIDER - PROVIDER TOKENS
PROVIDER:[TOKEN:[[id number]],FOLLOWUP:[1 week]],PROVIDER:[TOKEN:[[id number]],FOLLOWUP:[1 week]],PROVIDER:[TOKEN:[[id number]],FOLLOWUP:[1 week]],PROVIDER:[TOKEN:[[id number]],FOLLOWUP:[1-3 days]]

## 2019-06-17 NOTE — PROGRESS NOTE ADULT - ATTENDING COMMENTS
I saw and examined the patient personally. Spoke with above provider regarding this case. I reviewed the above findings completely.  I agree with the above history, physical, and plan which I have edited where appropriate.
Seen/examined. agree with above.
I saw and examined the patient personally. Spoke with above provider regarding this case. I reviewed the above findings completely.  I agree with the above history, physical, and plan which I have edited where appropriate.
I saw and examined the patient personally. Spoke with above provider regarding this case. I reviewed the above findings completely.  I agree with the above history, physical, and plan which I have edited where appropriate.
poss dc tomorrow if inr at goal

## 2019-06-17 NOTE — PROGRESS NOTE ADULT - REASON FOR ADMISSION
redness of skin, failed outpatient abx

## 2019-06-17 NOTE — DISCHARGE NOTE PROVIDER - NSDCCPCAREPLAN_GEN_ALL_CORE_FT
PRINCIPAL DISCHARGE DIAGNOSIS  Diagnosis: Cellulitis  Assessment and Plan of Treatment: you were managed with IV antibiotics and seen by infectious diseases who transitioned you to oral antibiotics, please complete all antibiotics as prescribed, your left leg showed clinical improvement, please follow up with your primary medical provider and wound care Dr. hua as outpatient within 1 week of discharge      SECONDARY DISCHARGE DIAGNOSES  Diagnosis: Right groin pain  Assessment and Plan of Treatment: acute, likely muscoloskeletal, please take tylenol 1000mg every 8 hours for 2-3 days as needed for severe pain, please follow up with your primary medical provider within 1-3 days for ongoing care and managment, you were seen by PT who recommended a rolling walker for stability with walking please use as indicated    Diagnosis: Liver lesion  Assessment and Plan of Treatment: incidental finding, as discussed you liver function test values within normal limits however CT abd showed:  Multiple indeterminate hypodensities probable cysts or hemangiomas. Recommend contrast-enhanced CT or MR for better characterization as outpatient, please follow up with hepatology within 1 week of discharge    Diagnosis: Aortic valve replaced  Assessment and Plan of Treatment: stable, please continue your home coumadin dosing and check your INR daily to assure a goal INR of 2.5-3.5, please contact immediately your cardiologist Dr. Rodriguez for any abnormal values and follow up with Dr. rodriguez within 1 week of discharge for ongoign care and management

## 2019-06-17 NOTE — DISCHARGE NOTE NURSING/CASE MANAGEMENT/SOCIAL WORK - NSDCDPATPORTLINK_GEN_ALL_CORE
You can access the [facility] Patient Portal, offered by [facility], by registering with the following website: [email or web address]

## 2019-06-17 NOTE — PROGRESS NOTE ADULT - PROVIDER SPECIALTY LIST ADULT
Cardiology
Hospitalist
Infectious Disease
Infectious Disease
Cardiology

## 2019-06-18 PROBLEM — G20 PARKINSON'S DISEASE: Chronic | Status: ACTIVE | Noted: 2019-06-13

## 2019-06-18 PROBLEM — Z95.2 PRESENCE OF PROSTHETIC HEART VALVE: Chronic | Status: ACTIVE | Noted: 2019-06-13

## 2019-06-18 PROBLEM — I10 ESSENTIAL (PRIMARY) HYPERTENSION: Chronic | Status: ACTIVE | Noted: 2019-06-13

## 2019-06-18 LAB
CULTURE RESULTS: SIGNIFICANT CHANGE UP
CULTURE RESULTS: SIGNIFICANT CHANGE UP
SPECIMEN SOURCE: SIGNIFICANT CHANGE UP
SPECIMEN SOURCE: SIGNIFICANT CHANGE UP

## 2019-06-19 ENCOUNTER — OTHER (OUTPATIENT)
Age: 74
End: 2019-06-19

## 2019-06-19 LAB — INR PPP: 2.1

## 2019-06-20 ENCOUNTER — APPOINTMENT (OUTPATIENT)
Dept: CARDIOLOGY | Facility: CLINIC | Age: 74
End: 2019-06-20
Payer: MEDICARE

## 2019-06-20 PROCEDURE — 85610 PROTHROMBIN TIME: CPT | Mod: QW

## 2019-06-20 PROCEDURE — 93793 ANTICOAG MGMT PT WARFARIN: CPT

## 2019-06-21 ENCOUNTER — APPOINTMENT (OUTPATIENT)
Dept: INTERNAL MEDICINE | Facility: CLINIC | Age: 74
End: 2019-06-21

## 2019-06-26 ENCOUNTER — OTHER (OUTPATIENT)
Age: 74
End: 2019-06-26

## 2019-06-26 VITALS — DIASTOLIC BLOOD PRESSURE: 74 MMHG | HEART RATE: 74 BPM | SYSTOLIC BLOOD PRESSURE: 170 MMHG | OXYGEN SATURATION: 96 %

## 2019-07-05 LAB — INR PPP: 2.3

## 2019-07-06 LAB — INR PPP: 3.5

## 2019-07-09 LAB
25(OH)D3 SERPL-MCNC: 33.7 NG/ML
ALBUMIN SERPL ELPH-MCNC: 4.6 G/DL
ALP BLD-CCNC: 78 U/L
ALT SERPL-CCNC: 27 U/L
ANION GAP SERPL CALC-SCNC: 13 MMOL/L
APPEARANCE: CLEAR
AST SERPL-CCNC: 21 U/L
BASOPHILS # BLD AUTO: 0.01 K/UL
BASOPHILS NFR BLD AUTO: 0.2 %
BILIRUB SERPL-MCNC: 0.7 MG/DL
BILIRUBIN URINE: NEGATIVE
BLOOD URINE: NEGATIVE
BUN SERPL-MCNC: 14 MG/DL
CALCIUM SERPL-MCNC: 9.8 MG/DL
CHLORIDE SERPL-SCNC: 101 MMOL/L
CHOLEST SERPL-MCNC: 134 MG/DL
CHOLEST/HDLC SERPL: 1.8 RATIO
CO2 SERPL-SCNC: 25 MMOL/L
COLOR: NORMAL
CREAT SERPL-MCNC: 0.97 MG/DL
CREAT SPEC-SCNC: 98 MG/DL
EOSINOPHIL # BLD AUTO: 0.21 K/UL
EOSINOPHIL NFR BLD AUTO: 5.1 %
ESTIMATED AVERAGE GLUCOSE: 180 MG/DL
FOLATE SERPL-MCNC: 12.5 NG/ML
GLUCOSE QUALITATIVE U: NEGATIVE
GLUCOSE SERPL-MCNC: 131 MG/DL
HBA1C MFR BLD HPLC: 7.9 %
HCT VFR BLD CALC: 44.6 %
HCV AB SER QL: NONREACTIVE
HCV S/CO RATIO: 0.1 S/CO
HDLC SERPL-MCNC: 75 MG/DL
HGB BLD-MCNC: 13.9 G/DL
IMM GRANULOCYTES NFR BLD AUTO: 0.7 %
KETONES URINE: NEGATIVE
LDLC SERPL CALC-MCNC: 50 MG/DL
LEUKOCYTE ESTERASE URINE: NEGATIVE
LYMPHOCYTES # BLD AUTO: 1.09 K/UL
LYMPHOCYTES NFR BLD AUTO: 26.3 %
MAN DIFF?: NORMAL
MCHC RBC-ENTMCNC: 28.5 PG
MCHC RBC-ENTMCNC: 31.2 GM/DL
MCV RBC AUTO: 91.6 FL
MICROALBUMIN 24H UR DL<=1MG/L-MCNC: <1.2 MG/DL
MICROALBUMIN/CREAT 24H UR-RTO: NORMAL MG/G
MONOCYTES # BLD AUTO: 0.3 K/UL
MONOCYTES NFR BLD AUTO: 7.2 %
NEUTROPHILS # BLD AUTO: 2.51 K/UL
NEUTROPHILS NFR BLD AUTO: 60.5 %
NITRITE URINE: NEGATIVE
PH URINE: 6
PLATELET # BLD AUTO: 128 K/UL
POTASSIUM SERPL-SCNC: 5.2 MMOL/L
PROT SERPL-MCNC: 6.8 G/DL
PROTEIN URINE: NEGATIVE
PSA SERPL-MCNC: 4.07 NG/ML
RBC # BLD: 4.87 M/UL
RBC # FLD: 13.9 %
SODIUM SERPL-SCNC: 139 MMOL/L
SPECIFIC GRAVITY URINE: 1.02
TRIGL SERPL-MCNC: 47 MG/DL
TSH SERPL-ACNC: 2.18 UIU/ML
URATE SERPL-MCNC: 3.9 MG/DL
UROBILINOGEN URINE: NORMAL
VIT B12 SERPL-MCNC: 696 PG/ML
WBC # FLD AUTO: 4.15 K/UL

## 2019-07-18 ENCOUNTER — APPOINTMENT (OUTPATIENT)
Dept: INTERNAL MEDICINE | Facility: CLINIC | Age: 74
End: 2019-07-18
Payer: MEDICARE

## 2019-07-18 VITALS
BODY MASS INDEX: 23.04 KG/M2 | OXYGEN SATURATION: 99 % | HEIGHT: 68 IN | TEMPERATURE: 99 F | WEIGHT: 152 LBS | HEART RATE: 78 BPM | RESPIRATION RATE: 14 BRPM | DIASTOLIC BLOOD PRESSURE: 60 MMHG | SYSTOLIC BLOOD PRESSURE: 120 MMHG

## 2019-07-18 DIAGNOSIS — K76.89 OTHER SPECIFIED DISEASES OF LIVER: ICD-10-CM

## 2019-07-18 PROCEDURE — 99214 OFFICE O/P EST MOD 30 MIN: CPT

## 2019-07-18 RX ORDER — CEFUROXIME AXETIL 500 MG/1
500 TABLET ORAL
Qty: 14 | Refills: 0 | Status: DISCONTINUED | COMMUNITY
Start: 2019-06-10 | End: 2019-07-18

## 2019-07-18 NOTE — HISTORY OF PRESENT ILLNESS
[FreeTextEntry1] : DM [de-identified] : Here for DM. He stopped janumet because glucose in the mornings was too low. humalog 12 u tid and lantus 40 u daily. His glucose has been a little high. Denies polyuria and polydipsia. Admits to eating too many cookies.

## 2019-07-18 NOTE — PHYSICAL EXAM
[Normal] : no carotid or abdominal bruits heard, no varicosities, pedal pulses are present, no peripheral edema, no extremity clubbing or cyanosis and no palpable aorta [Normal Affect] : the affect was normal [Normal Insight/Judgement] : insight and judgment were intact [Soft] : abdomen soft [Non Tender] : non-tender [de-identified] : hernia right mid to lower abdomen

## 2019-07-26 DIAGNOSIS — R10.9 UNSPECIFIED ABDOMINAL PAIN: ICD-10-CM

## 2019-07-26 LAB — INR PPP: 2.5

## 2019-08-01 LAB — HEMOCCULT STL QL IA: NEGATIVE

## 2019-08-12 ENCOUNTER — OTHER (OUTPATIENT)
Age: 74
End: 2019-08-12

## 2019-08-12 LAB — INR PPP: 4.2

## 2019-08-14 ENCOUNTER — APPOINTMENT (OUTPATIENT)
Dept: CARDIOLOGY | Facility: CLINIC | Age: 74
End: 2019-08-14
Payer: MEDICARE

## 2019-08-14 VITALS
OXYGEN SATURATION: 99 % | WEIGHT: 150 LBS | BODY MASS INDEX: 22.73 KG/M2 | SYSTOLIC BLOOD PRESSURE: 150 MMHG | HEIGHT: 68 IN | DIASTOLIC BLOOD PRESSURE: 78 MMHG | HEART RATE: 51 BPM

## 2019-08-14 PROCEDURE — 99214 OFFICE O/P EST MOD 30 MIN: CPT

## 2019-08-14 NOTE — REVIEW OF SYSTEMS
[Eyeglasses] : currently wearing eyeglasses [Urinary Frequency] : urinary frequency [Tremor] : a tremor was seen [Easy Bleeding] : a tendency for easy bleeding [Negative] : Genitourinary [Fever] : no fever [Chills] : no chills [Headache] : no headache [Feeling Fatigued] : not feeling fatigued [Seeing Double (Diplopia)] : no diplopia [Blurry Vision] : no blurred vision [Sore Throat] : no sore throat [Earache] : no earache [Joint Pain] : no joint pain [Sinus Pressure] : no sinus pressure [Skin: A Rash] : no rash: [Numbness (Hypesthesia)] : no numbness [Tingling (Paresthesia)] : no tingling [Depression] : no depression [Anxiety] : no anxiety [Excessive Thirst] : no polydipsia [Easy Bruising] : no tendency for easy bruising

## 2019-08-14 NOTE — REASON FOR VISIT
[Follow-Up - Clinic] : a clinic follow-up of [Coronary Artery Disease] : coronary artery disease [Aortic Stenosis] : aortic stenosis [Prosthetic Valve] : a prosthetic valve [Hyperlipidemia] : hyperlipidemia [FreeTextEntry1] : Parkinson's disease, diabetes

## 2019-08-14 NOTE — HISTORY OF PRESENT ILLNESS
[FreeTextEntry1] : I saw Roxy Lyons in the office today for a routine cardiac followup. He is a 74-year-old  man who is status post aortic valve replacement in 2004 with a mechanical valve for bicuspid aortic stenosis. At that time he had a bypass to the circumflex artery. The other arteries showed nonobstructive disease. He also is being treated for hyperlipidemia, and hypertension.\par \par His Parkinson's disease limits his physical activity.  Otherwise he has no chest pain, lightheadedness, palpitations.  More recently he has noted increasing dyspnea on exertion.. He had an echocardiogram performed 1/19 that demonstrated ejection fraction of 65%. Peak aortic gradient increased from 36 stage I diastolic dysfunction.  He had a stress test in 1/19 that showed no ischemia to a workload of 12.8 METs. He had carotid Doppler 5/17  that showed mild plaque without change compared to 2014.\par \par One work 7/19 demonstrates an A1c of 7.9. Cholesterol 134, triglycerides 47, HDL 75, and LDL 50..\par \par The patient's blood pressure today is excellent. His having no symptoms of orthostatic hypotension. Unfortunately his Parkinson's disease is getting progressively worse..\par \par Over the past 6 months the patient has noted a slight discomfort in his back area by the neck when he exercises. After about 4 minutes on the treadmill it seems to go away. There is no anterior chest pain and no shortness of breath. He otherwise has no other new symptoms.

## 2019-08-14 NOTE — DISCUSSION/SUMMARY
[FreeTextEntry1] : The patient's cardiac status is stable. He has no chest pain or shortness of breath. Has a normal stress test and echo 1/19. Blood work is excellent except for A1c that still high at 7.9.\par \par The patient will stay on his present medication. We'll make an effort to watch and improve his diet. If all is well I will see him in 6 months.\par \par We did go his recent echo and stress test. I answered all of his questions.

## 2019-08-14 NOTE — PHYSICAL EXAM
[General Appearance - Well Developed] : well developed [Well Groomed] : well groomed [Normal Appearance] : normal appearance [No Deformities] : no deformities [General Appearance - Well Nourished] : well nourished [Normal Conjunctiva] : the conjunctiva exhibited no abnormalities [General Appearance - In No Acute Distress] : no acute distress [Normal Jugular Venous A Waves Present] : normal jugular venous A waves present [Normal Oral Mucosa] : normal oral mucosa [Normal Jugular Venous V Waves Present] : normal jugular venous V waves present [No Jugular Venous Weathers A Waves] : no jugular venous weathers A waves [Exaggerated Use Of Accessory Muscles For Inspiration] : no accessory muscle use [Respiration, Rhythm And Depth] : normal respiratory rhythm and effort [Bowel Sounds] : normal bowel sounds [Auscultation Breath Sounds / Voice Sounds] : lungs were clear to auscultation bilaterally [Abdomen Soft] : soft [Abdomen Tenderness] : non-tender [Abnormal Walk] : normal gait [Nail Clubbing] : no clubbing of the fingernails [Cyanosis, Localized] : no localized cyanosis [Skin Color & Pigmentation] : normal skin color and pigmentation [Skin Turgor] : normal skin turgor [Oriented To Time, Place, And Person] : oriented to person, place, and time [] : no rash [Impaired Insight] : insight and judgment were intact [No Anxiety] : not feeling anxious [5th Left ICS - MCL] : palpated at the 5th LICS in the midclavicular line [Normal] : normal [No Precordial Heave] : no precordial heave was noted [Normal Rate] : normal [Rhythm Regular] : regular [Normal S1] : normal S1 [Normal S2] : normal S2 [No Gallop] : no gallop heard [Prosthetic Aortic Valve] : prosthetic aortic valve heard [III] : a grade 3 [1+] : right 1+ [2+] : left 2+ [No Abnormalities] : the abdominal aorta was not enlarged and no bruit was heard [No Pitting Edema] : no pitting edema present [Apical Thrill] : no thrill palpable at the apex

## 2019-08-26 LAB — INR PPP: 3

## 2019-09-23 LAB — INR PPP: 2.6

## 2019-10-09 ENCOUNTER — OTHER (OUTPATIENT)
Age: 74
End: 2019-10-09

## 2019-10-09 LAB — INR PPP: 2.4

## 2019-10-23 ENCOUNTER — OTHER (OUTPATIENT)
Age: 74
End: 2019-10-23

## 2019-10-23 LAB — INR PPP: 3.1

## 2019-11-21 ENCOUNTER — APPOINTMENT (OUTPATIENT)
Dept: CARDIOLOGY | Facility: CLINIC | Age: 74
End: 2019-11-21
Payer: MEDICARE

## 2019-11-21 VITALS
HEIGHT: 68 IN | HEART RATE: 78 BPM | DIASTOLIC BLOOD PRESSURE: 77 MMHG | OXYGEN SATURATION: 98 % | SYSTOLIC BLOOD PRESSURE: 152 MMHG | WEIGHT: 152 LBS | BODY MASS INDEX: 23.04 KG/M2

## 2019-11-21 PROCEDURE — 99214 OFFICE O/P EST MOD 30 MIN: CPT

## 2019-11-21 NOTE — HISTORY OF PRESENT ILLNESS
[FreeTextEntry1] : I saw Roxy Lyons in the office today for a routine cardiac followup. He is a 74-year-old  man who is status post aortic valve replacement in 2004 with a mechanical valve for bicuspid aortic stenosis. At that time he had a bypass to the circumflex artery. The other arteries showed nonobstructive disease. He also is being treated for hyperlipidemia, and hypertension.\par \par His Parkinson's disease limits his physical activity.  Otherwise he has no chest pain, lightheadedness, palpitations.  More recently he has noted increasing dyspnea on exertion.. He had an echocardiogram performed 1/19 that demonstrated ejection fraction of 65%. Peak aortic gradient increased from 36 stage I diastolic dysfunction.  He had a stress test in 1/19 that showed no ischemia to a workload of 12.8 METs. He had carotid Doppler 5/17  that showed mild plaque without change compared to 2014.\par \par One work 7/19 demonstrates an A1c of 7.9. Cholesterol 134, triglycerides 47, HDL 75, and LDL 50..\par \par The patient's blood pressure today is excellent. His having no symptoms of orthostatic hypotension. Unfortunately his Parkinson's disease is getting progressively worse..\par \par Over the past 6 months the patient has noted a slight discomfort in his back area by the neck when he exercises. After about 4 minutes on the treadmill it seems to go away. There is no anterior chest pain and no shortness of breath. He otherwise has no other new symptoms.\par \par The patient has been having symptomatic hypotension with bradycardia. Reduce his metoprolol from 50-25 mg once a day. He still had 2 additional episodes.\par \par Blood pressure today is 120/70 without significant orthostatic change.

## 2019-11-21 NOTE — DISCUSSION/SUMMARY
[FreeTextEntry1] : The patient reduce metoprolol to 25 mg every other day for 2 weeks. If his blood pressure and heart rate are is still good he will eliminate the metoprolol completely. We also split his Benzapril amlodipine to have better control over her blood pressure medications. If he still having problems he will call me. Otherwise I would see him in about 6 weeks. His other medications remain the same.

## 2019-12-19 LAB — INR PPP: 3.6

## 2019-12-31 LAB — INR PPP: 3.3

## 2020-01-01 NOTE — REVIEW OF SYSTEMS
40.1 [Eyeglasses] : currently wearing eyeglasses [Urinary Frequency] : urinary frequency [Tremor] : a tremor was seen [Easy Bleeding] : a tendency for easy bleeding [Negative] : Genitourinary [Fever] : no fever [Headache] : no headache [Chills] : no chills [Feeling Fatigued] : not feeling fatigued [Blurry Vision] : no blurred vision [Seeing Double (Diplopia)] : no diplopia [Earache] : no earache [Sore Throat] : no sore throat [Sinus Pressure] : no sinus pressure [Joint Pain] : no joint pain [Skin: A Rash] : no rash: [Numbness (Hypesthesia)] : no numbness [Tingling (Paresthesia)] : no tingling [Depression] : no depression [Anxiety] : no anxiety [Excessive Thirst] : no polydipsia [Easy Bruising] : no tendency for easy bruising

## 2020-01-16 LAB
25(OH)D3 SERPL-MCNC: 22.8 NG/ML
ALBUMIN SERPL ELPH-MCNC: 4.3 G/DL
ALP BLD-CCNC: 93 U/L
ALT SERPL-CCNC: 14 U/L
ANION GAP SERPL CALC-SCNC: 10 MMOL/L
APPEARANCE: CLEAR
AST SERPL-CCNC: 23 U/L
BACTERIA: NEGATIVE
BASOPHILS # BLD AUTO: 0.03 K/UL
BASOPHILS NFR BLD AUTO: 0.7 %
BILIRUB SERPL-MCNC: 0.8 MG/DL
BILIRUBIN URINE: NEGATIVE
BLOOD URINE: NEGATIVE
BUN SERPL-MCNC: 11 MG/DL
CALCIUM SERPL-MCNC: 9.5 MG/DL
CHLORIDE SERPL-SCNC: 101 MMOL/L
CHOLEST SERPL-MCNC: 144 MG/DL
CHOLEST/HDLC SERPL: 1.8 RATIO
CO2 SERPL-SCNC: 28 MMOL/L
COLOR: NORMAL
CREAT SERPL-MCNC: 0.9 MG/DL
CREAT SPEC-SCNC: 70 MG/DL
EOSINOPHIL # BLD AUTO: 0.29 K/UL
EOSINOPHIL NFR BLD AUTO: 6.7 %
ESTIMATED AVERAGE GLUCOSE: 212 MG/DL
FOLATE SERPL-MCNC: 12 NG/ML
GLUCOSE QUALITATIVE U: NEGATIVE
GLUCOSE SERPL-MCNC: 168 MG/DL
HBA1C MFR BLD HPLC: 9 %
HCT VFR BLD CALC: 45.9 %
HDLC SERPL-MCNC: 82 MG/DL
HGB BLD-MCNC: 14.5 G/DL
HYALINE CASTS: 0 /LPF
IMM GRANULOCYTES NFR BLD AUTO: 0.5 %
KETONES URINE: NEGATIVE
LDLC SERPL CALC-MCNC: 47 MG/DL
LEUKOCYTE ESTERASE URINE: NEGATIVE
LYMPHOCYTES # BLD AUTO: 1.26 K/UL
LYMPHOCYTES NFR BLD AUTO: 29 %
MAN DIFF?: NORMAL
MCHC RBC-ENTMCNC: 28.8 PG
MCHC RBC-ENTMCNC: 31.6 GM/DL
MCV RBC AUTO: 91.1 FL
MICROALBUMIN 24H UR DL<=1MG/L-MCNC: <1.2 MG/DL
MICROALBUMIN/CREAT 24H UR-RTO: NORMAL MG/G
MICROSCOPIC-UA: NORMAL
MONOCYTES # BLD AUTO: 0.3 K/UL
MONOCYTES NFR BLD AUTO: 6.9 %
NEUTROPHILS # BLD AUTO: 2.44 K/UL
NEUTROPHILS NFR BLD AUTO: 56.2 %
NITRITE URINE: NEGATIVE
PH URINE: 5.5
PLATELET # BLD AUTO: 100 K/UL
POTASSIUM SERPL-SCNC: 4.7 MMOL/L
PROT SERPL-MCNC: 6.4 G/DL
PROTEIN URINE: NEGATIVE
PSA SERPL-MCNC: 2.89 NG/ML
RBC # BLD: 5.04 M/UL
RBC # FLD: 13.6 %
RED BLOOD CELLS URINE: 1 /HPF
SODIUM SERPL-SCNC: 139 MMOL/L
SPECIFIC GRAVITY URINE: 1.01
SQUAMOUS EPITHELIAL CELLS: 0 /HPF
TRIGL SERPL-MCNC: 70 MG/DL
TSH SERPL-ACNC: 2.6 UIU/ML
URATE SERPL-MCNC: 3.2 MG/DL
UROBILINOGEN URINE: NORMAL
VIT B12 SERPL-MCNC: 938 PG/ML
WBC # FLD AUTO: 4.34 K/UL
WHITE BLOOD CELLS URINE: 0 /HPF

## 2020-01-21 ENCOUNTER — APPOINTMENT (OUTPATIENT)
Dept: INTERNAL MEDICINE | Facility: CLINIC | Age: 75
End: 2020-01-21
Payer: MEDICARE

## 2020-01-21 VITALS
WEIGHT: 152 LBS | OXYGEN SATURATION: 97 % | HEIGHT: 68 IN | SYSTOLIC BLOOD PRESSURE: 130 MMHG | RESPIRATION RATE: 14 BRPM | BODY MASS INDEX: 23.04 KG/M2 | DIASTOLIC BLOOD PRESSURE: 70 MMHG | TEMPERATURE: 98.1 F | HEART RATE: 88 BPM

## 2020-01-21 DIAGNOSIS — Z00.00 ENCOUNTER FOR GENERAL ADULT MEDICAL EXAMINATION W/OUT ABNORMAL FINDINGS: ICD-10-CM

## 2020-01-21 PROCEDURE — G0439: CPT

## 2020-01-21 NOTE — PHYSICAL EXAM
[No Acute Distress] : no acute distress [Well Nourished] : well nourished [Well-Appearing] : well-appearing [Well Developed] : well developed [PERRL] : pupils equal round and reactive to light [Normal Sclera/Conjunctiva] : normal sclera/conjunctiva [EOMI] : extraocular movements intact [Normal Oropharynx] : the oropharynx was normal [No JVD] : no jugular venous distention [Normal Outer Ear/Nose] : the outer ears and nose were normal in appearance [Supple] : supple [No Lymphadenopathy] : no lymphadenopathy [Thyroid Normal, No Nodules] : the thyroid was normal and there were no nodules present [No Respiratory Distress] : no respiratory distress  [Clear to Auscultation] : lungs were clear to auscultation bilaterally [No Accessory Muscle Use] : no accessory muscle use [Normal Rate] : normal rate  [Regular Rhythm] : with a regular rhythm [Normal S1, S2] : normal S1 and S2 [No Murmur] : no murmur heard [No Carotid Bruits] : no carotid bruits [No Abdominal Bruit] : a ~M bruit was not heard ~T in the abdomen [No Varicosities] : no varicosities [No Edema] : there was no peripheral edema [Pedal Pulses Present] : the pedal pulses are present [No Palpable Aorta] : no palpable aorta [No Extremity Clubbing/Cyanosis] : no extremity clubbing/cyanosis [Soft] : abdomen soft [Non Tender] : non-tender [Non-distended] : non-distended [No Masses] : no abdominal mass palpated [No HSM] : no HSM [Normal Posterior Cervical Nodes] : no posterior cervical lymphadenopathy [Normal Bowel Sounds] : normal bowel sounds [Normal Anterior Cervical Nodes] : no anterior cervical lymphadenopathy [No Spinal Tenderness] : no spinal tenderness [No CVA Tenderness] : no CVA  tenderness [Grossly Normal Strength/Tone] : grossly normal strength/tone [No Joint Swelling] : no joint swelling [No Rash] : no rash [Coordination Grossly Intact] : coordination grossly intact [No Focal Deficits] : no focal deficits [Normal Gait] : normal gait [Deep Tendon Reflexes (DTR)] : deep tendon reflexes were 2+ and symmetric [Normal Affect] : the affect was normal [Normal Insight/Judgement] : insight and judgment were intact

## 2020-01-21 NOTE — HISTORY OF PRESENT ILLNESS
[de-identified] : Pt went to [place] in December and ate a lot of sweets. A1c is very high. \par Dr Caceres in [place] = ophthalmology.

## 2020-01-22 LAB — INR PPP: 3.4

## 2020-01-28 ENCOUNTER — RX RENEWAL (OUTPATIENT)
Age: 75
End: 2020-01-28

## 2020-02-13 LAB — INR PPP: 3.7

## 2020-02-19 ENCOUNTER — APPOINTMENT (OUTPATIENT)
Dept: CARDIOLOGY | Facility: CLINIC | Age: 75
End: 2020-02-19

## 2020-02-19 ENCOUNTER — RX RENEWAL (OUTPATIENT)
Age: 75
End: 2020-02-19

## 2020-03-02 ENCOUNTER — APPOINTMENT (OUTPATIENT)
Dept: INTERNAL MEDICINE | Facility: CLINIC | Age: 75
End: 2020-03-02
Payer: MEDICARE

## 2020-03-02 VITALS
BODY MASS INDEX: 22.88 KG/M2 | HEART RATE: 64 BPM | TEMPERATURE: 98.1 F | HEIGHT: 68 IN | OXYGEN SATURATION: 98 % | RESPIRATION RATE: 14 BRPM | SYSTOLIC BLOOD PRESSURE: 146 MMHG | DIASTOLIC BLOOD PRESSURE: 70 MMHG | WEIGHT: 151 LBS

## 2020-03-02 DIAGNOSIS — R49.0 DYSPHONIA: ICD-10-CM

## 2020-03-02 DIAGNOSIS — R21 RASH AND OTHER NONSPECIFIC SKIN ERUPTION: ICD-10-CM

## 2020-03-02 DIAGNOSIS — J06.9 ACUTE UPPER RESPIRATORY INFECTION, UNSPECIFIED: ICD-10-CM

## 2020-03-02 DIAGNOSIS — R09.82 POSTNASAL DRIP: ICD-10-CM

## 2020-03-02 LAB
FLUAV SPEC QL CULT: NORMAL
FLUBV AG SPEC QL IA: NORMAL
INR PPP: 2.5
S PYO AG SPEC QL IA: NORMAL

## 2020-03-02 PROCEDURE — 87880 STREP A ASSAY W/OPTIC: CPT | Mod: QW

## 2020-03-02 PROCEDURE — 99214 OFFICE O/P EST MOD 30 MIN: CPT | Mod: 25

## 2020-03-02 PROCEDURE — 87804 INFLUENZA ASSAY W/OPTIC: CPT | Mod: QW

## 2020-03-02 NOTE — PHYSICAL EXAM
[No Acute Distress] : no acute distress [Well Nourished] : well nourished [Well Developed] : well developed [Normal Sclera/Conjunctiva] : normal sclera/conjunctiva [Well-Appearing] : well-appearing [PERRL] : pupils equal round and reactive to light [Normal Outer Ear/Nose] : the outer ears and nose were normal in appearance [EOMI] : extraocular movements intact [Normal Oropharynx] : the oropharynx was normal [No Lymphadenopathy] : no lymphadenopathy [No JVD] : no jugular venous distention [Supple] : supple [Thyroid Normal, No Nodules] : the thyroid was normal and there were no nodules present [No Respiratory Distress] : no respiratory distress  [No Accessory Muscle Use] : no accessory muscle use [Clear to Auscultation] : lungs were clear to auscultation bilaterally [Normal Rate] : normal rate  [Regular Rhythm] : with a regular rhythm [Normal S1, S2] : normal S1 and S2 [No Murmur] : no murmur heard [No Carotid Bruits] : no carotid bruits [No Abdominal Bruit] : a ~M bruit was not heard ~T in the abdomen [No Varicosities] : no varicosities [Pedal Pulses Present] : the pedal pulses are present [No Edema] : there was no peripheral edema [No Palpable Aorta] : no palpable aorta [No Extremity Clubbing/Cyanosis] : no extremity clubbing/cyanosis [Non Tender] : non-tender [Soft] : abdomen soft [No Masses] : no abdominal mass palpated [Non-distended] : non-distended [No HSM] : no HSM [Normal Bowel Sounds] : normal bowel sounds [Normal Anterior Cervical Nodes] : no anterior cervical lymphadenopathy [Normal Posterior Cervical Nodes] : no posterior cervical lymphadenopathy [No Spinal Tenderness] : no spinal tenderness [No CVA Tenderness] : no CVA  tenderness [Grossly Normal Strength/Tone] : grossly normal strength/tone [No Rash] : no rash [No Joint Swelling] : no joint swelling [Normal Gait] : normal gait [Coordination Grossly Intact] : coordination grossly intact [No Focal Deficits] : no focal deficits [Normal Affect] : the affect was normal [Deep Tendon Reflexes (DTR)] : deep tendon reflexes were 2+ and symmetric [Normal Insight/Judgement] : insight and judgment were intact

## 2020-03-05 ENCOUNTER — APPOINTMENT (OUTPATIENT)
Dept: INTERNAL MEDICINE | Facility: CLINIC | Age: 75
End: 2020-03-05

## 2020-03-18 LAB — INR PPP: 2.3

## 2020-03-30 ENCOUNTER — APPOINTMENT (OUTPATIENT)
Dept: DERMATOLOGY | Facility: CLINIC | Age: 75
End: 2020-03-30
Payer: MEDICARE

## 2020-03-30 DIAGNOSIS — L29.9 PRURITUS, UNSPECIFIED: ICD-10-CM

## 2020-03-30 PROCEDURE — 99214 OFFICE O/P EST MOD 30 MIN: CPT | Mod: 95

## 2020-03-30 RX ORDER — PRAMOXINE HYDROCHLORIDE 10 MG/ML
1 LOTION TOPICAL
Qty: 1 | Refills: 2 | Status: ACTIVE | COMMUNITY
Start: 2020-03-30 | End: 1900-01-01

## 2020-04-02 ENCOUNTER — APPOINTMENT (OUTPATIENT)
Dept: DERMATOLOGY | Facility: CLINIC | Age: 75
End: 2020-04-02

## 2020-04-06 LAB — INR PPP: 3.6

## 2020-04-14 ENCOUNTER — APPOINTMENT (OUTPATIENT)
Dept: DERMATOLOGY | Facility: CLINIC | Age: 75
End: 2020-04-14

## 2020-04-16 LAB — INR PPP: 2.4

## 2020-04-28 LAB — INR PPP: 2.5

## 2020-05-17 ENCOUNTER — RX RENEWAL (OUTPATIENT)
Age: 75
End: 2020-05-17

## 2020-05-19 LAB — INR PPP: 2.4

## 2020-06-16 LAB — INR PPP: 2.8

## 2020-06-24 LAB
ALBUMIN SERPL ELPH-MCNC: 4.1 G/DL
ALP BLD-CCNC: 77 U/L
ALT SERPL-CCNC: 17 U/L
ANION GAP SERPL CALC-SCNC: 11 MMOL/L
AST SERPL-CCNC: 27 U/L
BASOPHILS # BLD AUTO: 0.03 K/UL
BASOPHILS NFR BLD AUTO: 0.7 %
BILIRUB SERPL-MCNC: 0.6 MG/DL
BUN SERPL-MCNC: 12 MG/DL
CALCIUM SERPL-MCNC: 9.2 MG/DL
CHLORIDE SERPL-SCNC: 105 MMOL/L
CHOLEST SERPL-MCNC: 131 MG/DL
CHOLEST/HDLC SERPL: 2.1 RATIO
CO2 SERPL-SCNC: 27 MMOL/L
CREAT SERPL-MCNC: 0.99 MG/DL
EOSINOPHIL # BLD AUTO: 0.36 K/UL
EOSINOPHIL NFR BLD AUTO: 8.5 %
ESTIMATED AVERAGE GLUCOSE: 157 MG/DL
GLUCOSE SERPL-MCNC: 99 MG/DL
HBA1C MFR BLD HPLC: 7.1 %
HCT VFR BLD CALC: 45.9 %
HDLC SERPL-MCNC: 63 MG/DL
HGB BLD-MCNC: 14.3 G/DL
IMM GRANULOCYTES NFR BLD AUTO: 0.2 %
LDLC SERPL CALC-MCNC: 55 MG/DL
LYMPHOCYTES # BLD AUTO: 1.52 K/UL
LYMPHOCYTES NFR BLD AUTO: 35.7 %
MAN DIFF?: NORMAL
MCHC RBC-ENTMCNC: 28.7 PG
MCHC RBC-ENTMCNC: 31.2 GM/DL
MCV RBC AUTO: 92.2 FL
MONOCYTES # BLD AUTO: 0.3 K/UL
MONOCYTES NFR BLD AUTO: 7 %
NEUTROPHILS # BLD AUTO: 2.04 K/UL
NEUTROPHILS NFR BLD AUTO: 47.9 %
PLATELET # BLD AUTO: 101 K/UL
POTASSIUM SERPL-SCNC: 4.4 MMOL/L
PROT SERPL-MCNC: 6.2 G/DL
RBC # BLD: 4.98 M/UL
RBC # FLD: 13.5 %
SODIUM SERPL-SCNC: 142 MMOL/L
TRIGL SERPL-MCNC: 67 MG/DL
WBC # FLD AUTO: 4.26 K/UL

## 2020-06-25 ENCOUNTER — APPOINTMENT (OUTPATIENT)
Dept: INTERNAL MEDICINE | Facility: CLINIC | Age: 75
End: 2020-06-25
Payer: MEDICARE

## 2020-06-25 VITALS
HEIGHT: 68 IN | BODY MASS INDEX: 23.79 KG/M2 | OXYGEN SATURATION: 97 % | SYSTOLIC BLOOD PRESSURE: 128 MMHG | WEIGHT: 157 LBS | TEMPERATURE: 98.7 F | RESPIRATION RATE: 14 BRPM | HEART RATE: 68 BPM | DIASTOLIC BLOOD PRESSURE: 80 MMHG

## 2020-06-25 PROCEDURE — 99214 OFFICE O/P EST MOD 30 MIN: CPT

## 2020-06-25 RX ORDER — INSULIN LISPRO 100 [IU]/ML
100 INJECTION, SOLUTION INTRAVENOUS; SUBCUTANEOUS 3 TIMES DAILY
Qty: 3 | Refills: 2 | Status: ACTIVE | COMMUNITY
Start: 2019-01-21 | End: 1900-01-01

## 2020-06-25 NOTE — HISTORY OF PRESENT ILLNESS
[FreeTextEntry1] : DM and other medical conditions\par Does not want a colonoscopy.  [de-identified] : Pt is diabetic and uses insulin. \par Has been to ophthalmology a couple months ago.\par He has toenail fungus and going to podiatry.

## 2020-06-26 LAB
CREAT SPEC-SCNC: 154 MG/DL
MICROALBUMIN 24H UR DL<=1MG/L-MCNC: <1.2 MG/DL
MICROALBUMIN/CREAT 24H UR-RTO: NORMAL MG/G

## 2020-06-29 ENCOUNTER — RX RENEWAL (OUTPATIENT)
Age: 75
End: 2020-06-29

## 2020-06-29 DIAGNOSIS — Z51.81 ENCOUNTER FOR THERAPEUTIC DRUG LVL MONITORING: ICD-10-CM

## 2020-06-29 DIAGNOSIS — Z79.01 ENCOUNTER FOR THERAPEUTIC DRUG LVL MONITORING: ICD-10-CM

## 2020-06-29 LAB — INR PPP: 2.7

## 2020-07-02 ENCOUNTER — APPOINTMENT (OUTPATIENT)
Dept: CARDIOLOGY | Facility: CLINIC | Age: 75
End: 2020-07-02
Payer: MEDICARE

## 2020-07-02 ENCOUNTER — NON-APPOINTMENT (OUTPATIENT)
Age: 75
End: 2020-07-02

## 2020-07-02 VITALS
BODY MASS INDEX: 24.25 KG/M2 | WEIGHT: 160 LBS | OXYGEN SATURATION: 98 % | SYSTOLIC BLOOD PRESSURE: 169 MMHG | DIASTOLIC BLOOD PRESSURE: 84 MMHG | HEIGHT: 68 IN | HEART RATE: 67 BPM

## 2020-07-02 DIAGNOSIS — R09.89 OTHER SPECIFIED SYMPTOMS AND SIGNS INVOLVING THE CIRCULATORY AND RESPIRATORY SYSTEMS: ICD-10-CM

## 2020-07-02 PROCEDURE — 93000 ELECTROCARDIOGRAM COMPLETE: CPT

## 2020-07-02 PROCEDURE — 99214 OFFICE O/P EST MOD 30 MIN: CPT

## 2020-07-02 NOTE — PHYSICAL EXAM
[Normal Appearance] : normal appearance [General Appearance - Well Developed] : well developed [General Appearance - Well Nourished] : well nourished [Well Groomed] : well groomed [Normal Conjunctiva] : the conjunctiva exhibited no abnormalities [No Deformities] : no deformities [General Appearance - In No Acute Distress] : no acute distress [Normal Jugular Venous A Waves Present] : normal jugular venous A waves present [Normal Oral Mucosa] : normal oral mucosa [No Jugular Venous Weathers A Waves] : no jugular venous weathers A waves [Normal Jugular Venous V Waves Present] : normal jugular venous V waves present [Respiration, Rhythm And Depth] : normal respiratory rhythm and effort [Exaggerated Use Of Accessory Muscles For Inspiration] : no accessory muscle use [Bowel Sounds] : normal bowel sounds [Auscultation Breath Sounds / Voice Sounds] : lungs were clear to auscultation bilaterally [Abdomen Soft] : soft [Abdomen Tenderness] : non-tender [Abnormal Walk] : normal gait [Cyanosis, Localized] : no localized cyanosis [Nail Clubbing] : no clubbing of the fingernails [] : no rash [Skin Turgor] : normal skin turgor [Skin Color & Pigmentation] : normal skin color and pigmentation [Impaired Insight] : insight and judgment were intact [Oriented To Time, Place, And Person] : oriented to person, place, and time [5th Left ICS - MCL] : palpated at the 5th LICS in the midclavicular line [No Anxiety] : not feeling anxious [Normal] : normal [No Precordial Heave] : no precordial heave was noted [Normal S1] : normal S1 [Normal Rate] : normal [Rhythm Regular] : regular [No Gallop] : no gallop heard [Normal S2] : normal S2 [Prosthetic Aortic Valve] : prosthetic aortic valve heard [III] : a grade 3 [2+] : Carotid: right 2+ [1+] : right 1+ [No Abnormalities] : the abdominal aorta was not enlarged and no bruit was heard [No Pitting Edema] : no pitting edema present [Apical Thrill] : no thrill palpable at the apex

## 2020-07-02 NOTE — HISTORY OF PRESENT ILLNESS
[FreeTextEntry1] : I saw Roxy Lyons in the office today for a routine cardiac followup. He is a 75-year-old  man who is status post aortic valve replacement in 2004 with a mechanical valve for bicuspid aortic stenosis. At that time he had a bypass to the circumflex artery. The other arteries showed nonobstructive disease. He also is being treated for hyperlipidemia, and hypertension.\par \par His Parkinson's disease limits his physical activity.  Otherwise he has no chest pain, lightheadedness, palpitations.  More recently he has noted increasing dyspnea on exertion.. He had an echocardiogram performed 1/19 that demonstrated ejection fraction of 65%. Peak aortic gradient increased from 36 stage I diastolic dysfunction.  He had a stress test in 1/19 that showed no ischemia to a workload of 12.8 METs. He had carotid Doppler 5/17  that showed mild plaque without change compared to 2014.\par \par Blood work 6/20 demonstrated an A1c of 7.1. Cholesterol 131, triglycerides 67, HDL 63, and LDL 55.\par \par The patient's blood pressure today is excellent. His having no symptoms of orthostatic hypotension. Unfortunately his Parkinson's disease is getting progressively worse..\par \par Over the past 6 months the patient has noted a slight discomfort in his back area by the neck when he exercises. After about 4 minutes on the treadmill it seems to go away. There is no anterior chest pain and no shortness of breath. He otherwise has no other new symptoms.\par \par The patient has been having symptomatic hypotension with bradycardia. Tapered and stopped his metoprolol and now is off amlodipine. Only takes benazepril 5 mg twice a day.\par \par ECG demonstrates sinus rhythm with a right bundle branch block. There are no changes.\par \par Blood pressure today is 146/74 without significant orthostatic change.

## 2020-07-02 NOTE — DISCUSSION/SUMMARY
[FreeTextEntry1] : The patient is doing well without any signs or symptoms of active heart disease. His blood pressure is getting more difficult. Does get very low readings and dry high readings and tries to adjust his medication properly. He is having no chest pain or shortness of breath and is physically active.\par \par He'll stay on his present medication. We'll schedule an echocardiogram and a carotid Doppler. If he has any problems he will call me. We did go over his medication I answered his questions.  I would see him in 4 months.

## 2020-07-02 NOTE — REVIEW OF SYSTEMS
[Eyeglasses] : currently wearing eyeglasses [Urinary Frequency] : urinary frequency [Tremor] : a tremor was seen [Easy Bleeding] : a tendency for easy bleeding [Negative] : Genitourinary [Fever] : no fever [Headache] : no headache [Chills] : no chills [Feeling Fatigued] : not feeling fatigued [Blurry Vision] : no blurred vision [Seeing Double (Diplopia)] : no diplopia [Earache] : no earache [Sore Throat] : no sore throat [Sinus Pressure] : no sinus pressure [Joint Pain] : no joint pain [Numbness (Hypesthesia)] : no numbness [Skin: A Rash] : no rash: [Tingling (Paresthesia)] : no tingling [Depression] : no depression [Anxiety] : no anxiety [Excessive Thirst] : no polydipsia [Easy Bruising] : no tendency for easy bruising

## 2020-07-20 LAB — INR PPP: 3.6

## 2020-08-03 ENCOUNTER — APPOINTMENT (OUTPATIENT)
Dept: CARDIOLOGY | Facility: CLINIC | Age: 75
End: 2020-08-03
Payer: MEDICARE

## 2020-08-03 PROCEDURE — 93880 EXTRACRANIAL BILAT STUDY: CPT

## 2020-08-03 PROCEDURE — 93306 TTE W/DOPPLER COMPLETE: CPT

## 2020-08-10 LAB — INR PPP: 2.5

## 2020-09-01 LAB — INR PPP: 3.6

## 2020-09-29 LAB — INR PPP: 3

## 2020-11-02 LAB — INR PPP: 2.5

## 2020-11-17 LAB — INR PPP: 2.6

## 2020-11-27 LAB
25(OH)D3 SERPL-MCNC: 49.6 NG/ML
ALBUMIN SERPL ELPH-MCNC: 4.4 G/DL
ALP BLD-CCNC: 97 U/L
ALT SERPL-CCNC: 27 U/L
ANION GAP SERPL CALC-SCNC: 9 MMOL/L
AST SERPL-CCNC: 29 U/L
BASOPHILS # BLD AUTO: 0.04 K/UL
BASOPHILS NFR BLD AUTO: 0.8 %
BILIRUB SERPL-MCNC: 0.7 MG/DL
BUN SERPL-MCNC: 11 MG/DL
CALCIUM SERPL-MCNC: 9.5 MG/DL
CHLORIDE SERPL-SCNC: 101 MMOL/L
CHOLEST SERPL-MCNC: 134 MG/DL
CO2 SERPL-SCNC: 28 MMOL/L
CREAT SERPL-MCNC: 0.81 MG/DL
CREAT SPEC-SCNC: 92 MG/DL
EOSINOPHIL # BLD AUTO: 0.25 K/UL
EOSINOPHIL NFR BLD AUTO: 4.9 %
ESTIMATED AVERAGE GLUCOSE: 169 MG/DL
FOLATE SERPL-MCNC: 10.9 NG/ML
GLUCOSE SERPL-MCNC: 173 MG/DL
HBA1C MFR BLD HPLC: 7.5 %
HCT VFR BLD CALC: 48.4 %
HDLC SERPL-MCNC: 76 MG/DL
HGB BLD-MCNC: 15.7 G/DL
IMM GRANULOCYTES NFR BLD AUTO: 0.2 %
LDLC SERPL CALC-MCNC: 46 MG/DL
LYMPHOCYTES # BLD AUTO: 1.25 K/UL
LYMPHOCYTES NFR BLD AUTO: 24.4 %
MAN DIFF?: NORMAL
MCHC RBC-ENTMCNC: 29.9 PG
MCHC RBC-ENTMCNC: 32.4 GM/DL
MCV RBC AUTO: 92.2 FL
MICROALBUMIN 24H UR DL<=1MG/L-MCNC: <1.2 MG/DL
MICROALBUMIN/CREAT 24H UR-RTO: NORMAL MG/G
MONOCYTES # BLD AUTO: 0.33 K/UL
MONOCYTES NFR BLD AUTO: 6.4 %
NEUTROPHILS # BLD AUTO: 3.25 K/UL
NEUTROPHILS NFR BLD AUTO: 63.3 %
NONHDLC SERPL-MCNC: 58 MG/DL
PLATELET # BLD AUTO: 115 K/UL
POTASSIUM SERPL-SCNC: 5 MMOL/L
PROT SERPL-MCNC: 6.8 G/DL
RBC # BLD: 5.25 M/UL
RBC # FLD: 13 %
SODIUM SERPL-SCNC: 138 MMOL/L
TRIGL SERPL-MCNC: 58 MG/DL
TSH SERPL-ACNC: 1.71 UIU/ML
VIT B12 SERPL-MCNC: >2000 PG/ML
WBC # FLD AUTO: 5.13 K/UL

## 2020-12-01 LAB — INR PPP: 3.4

## 2020-12-07 ENCOUNTER — APPOINTMENT (OUTPATIENT)
Dept: INTERNAL MEDICINE | Facility: CLINIC | Age: 75
End: 2020-12-07
Payer: MEDICARE

## 2020-12-07 VITALS
TEMPERATURE: 97.5 F | HEART RATE: 72 BPM | OXYGEN SATURATION: 98 % | HEIGHT: 68 IN | BODY MASS INDEX: 22.88 KG/M2 | SYSTOLIC BLOOD PRESSURE: 154 MMHG | DIASTOLIC BLOOD PRESSURE: 90 MMHG | RESPIRATION RATE: 14 BRPM | WEIGHT: 151 LBS

## 2020-12-07 VITALS — SYSTOLIC BLOOD PRESSURE: 160 MMHG | DIASTOLIC BLOOD PRESSURE: 92 MMHG

## 2020-12-07 PROCEDURE — 99214 OFFICE O/P EST MOD 30 MIN: CPT

## 2020-12-07 NOTE — HISTORY OF PRESENT ILLNESS
[FreeTextEntry1] : BP check and f/u [de-identified] : Pt states that he his BP is not well controlled on benazepril. He takes the dosage depending on his BP reading.

## 2020-12-21 PROBLEM — Z87.09 HISTORY OF ACUTE BRONCHITIS: Status: RESOLVED | Noted: 2019-02-28 | Resolved: 2020-12-21

## 2020-12-21 PROBLEM — J01.90 ACUTE RHINOSINUSITIS: Status: RESOLVED | Noted: 2019-05-02 | Resolved: 2020-12-21

## 2020-12-21 LAB — INR PPP: 2.9

## 2020-12-23 PROBLEM — J06.9 VIRAL UPPER RESPIRATORY TRACT INFECTION: Status: RESOLVED | Noted: 2020-03-02 | Resolved: 2020-12-23

## 2021-01-07 LAB — INR PPP: 2.7

## 2021-01-20 ENCOUNTER — RX RENEWAL (OUTPATIENT)
Age: 76
End: 2021-01-20

## 2021-01-20 LAB — INR PPP: 2.3

## 2021-01-25 ENCOUNTER — APPOINTMENT (OUTPATIENT)
Dept: CARDIOLOGY | Facility: CLINIC | Age: 76
End: 2021-01-25
Payer: MEDICARE

## 2021-01-25 VITALS
HEIGHT: 68 IN | BODY MASS INDEX: 23.49 KG/M2 | SYSTOLIC BLOOD PRESSURE: 205 MMHG | DIASTOLIC BLOOD PRESSURE: 90 MMHG | WEIGHT: 155 LBS | OXYGEN SATURATION: 99 % | HEART RATE: 74 BPM

## 2021-01-25 PROCEDURE — 99214 OFFICE O/P EST MOD 30 MIN: CPT

## 2021-01-25 NOTE — DISCUSSION/SUMMARY
[FreeTextEntry1] : The patient has no signs or symptoms of active heart disease. He exercises regularly and has no chest pain or shortness of breath.His main problem is hypotension which is from a combination of Parkinson's disease and the Parkinson's medication. He is concerned is not taking an ACE inhibitor.\par Number atraumatic enalapril 2.5 mg once a day CT can tolerate this medication. We discussed his echocardiogram which shows his prosthetic aortic valve is good without change. He has normal ejection fraction. His carotid Doppler in August showed an increasing plaque now mild to moderately increased his atorvastatin 40 mg a day. As result his cholesterol has come down to 134 with an HDL 76 LDL of 46. A1c is still elevated at 7.5.\par \par The patient will call me and let me know how is doing with the low-dose enalapril. He has any further problems we'll discuss them. If all is well I will see him in 6 months.

## 2021-01-25 NOTE — HISTORY OF PRESENT ILLNESS
[FreeTextEntry1] : I saw Roxy Lyons in the office today for a routine cardiac followup. He is a 76-year-old  man who is status post aortic valve replacement in 2004 with a mechanical valve for bicuspid aortic stenosis. At that time he had a bypass to the circumflex artery. The other arteries showed nonobstructive disease. He also is being treated for hyperlipidemia, and hypertension.\par \par His Parkinson's disease limits his physical activity.  Otherwise he has no chest pain, lightheadedness, palpitations.  More recently he has noted increasing dyspnea on exertion.. He had an echocardiogram performed 8/20 that demonstrated ejection fraction of 55-60%. Peak aortic gradient increased from 36 stage I diastolic dysfunction.  He had a stress test in 1/19 that showed no ischemia to a workload of 12.8 METs. He had carotid Doppler 5/17  that showed mild plaque without change compared to 2014.\par \par Blood work 11/20 demonstrated an A1c of 7.5. Cholesterol 134, triglycerides 58, HDL 76, and LDL 46.\par \par The patient's blood pressure today is excellent. His having no symptoms of orthostatic hypotension. Unfortunately his Parkinson's disease is getting progressively worse..\par \par Over the past 6 months the patient has noted a slight discomfort in his back area by the neck when he exercises. After about 4 minutes on the treadmill it seems to go away. There is no anterior chest pain and no shortness of breath. He otherwise has no other new symptoms.\par \par The patient has been having symptomatic hypotension with bradycardia. Tapered and stopped his metoprolol and now is off amlodipine. Only takes benazepril 5 mg twice a day An as-needed basis. Most of the time his blood pressure is too low to take the medication.\par \par ECG demonstrates sinus rhythm with a right bundle branch block. There are no changes.\par \par Blood pressure today is 146/74 without significant orthostatic change.

## 2021-01-25 NOTE — PHYSICAL EXAM
[General Appearance - Well Developed] : well developed [Well Groomed] : well groomed [Normal Appearance] : normal appearance [General Appearance - Well Nourished] : well nourished [No Deformities] : no deformities [General Appearance - In No Acute Distress] : no acute distress [Normal Conjunctiva] : the conjunctiva exhibited no abnormalities [Normal Oral Mucosa] : normal oral mucosa [Normal Jugular Venous A Waves Present] : normal jugular venous A waves present [Normal Jugular Venous V Waves Present] : normal jugular venous V waves present [No Jugular Venous Weathers A Waves] : no jugular venous weathers A waves [Respiration, Rhythm And Depth] : normal respiratory rhythm and effort [Exaggerated Use Of Accessory Muscles For Inspiration] : no accessory muscle use [Auscultation Breath Sounds / Voice Sounds] : lungs were clear to auscultation bilaterally [Bowel Sounds] : normal bowel sounds [Abdomen Soft] : soft [Abdomen Tenderness] : non-tender [Abnormal Walk] : normal gait [Nail Clubbing] : no clubbing of the fingernails [Cyanosis, Localized] : no localized cyanosis [Skin Color & Pigmentation] : normal skin color and pigmentation [Skin Turgor] : normal skin turgor [] : no rash [Oriented To Time, Place, And Person] : oriented to person, place, and time [Impaired Insight] : insight and judgment were intact [No Anxiety] : not feeling anxious [5th Left ICS - MCL] : palpated at the 5th LICS in the midclavicular line [Normal] : normal [No Precordial Heave] : no precordial heave was noted [Rhythm Regular] : regular [Normal Rate] : normal [Normal S1] : normal S1 [Normal S2] : normal S2 [No Gallop] : no gallop heard [Prosthetic Aortic Valve] : prosthetic aortic valve heard [III] : a grade 3 [2+] : left 2+ [1+] : right 1+ [No Abnormalities] : the abdominal aorta was not enlarged and no bruit was heard [No Pitting Edema] : no pitting edema present [Apical Thrill] : no thrill palpable at the apex

## 2021-03-18 LAB — INR PPP: 3.5

## 2021-04-12 LAB — INR PPP: 4.2

## 2021-04-30 LAB — INR PPP: 3

## 2021-05-24 LAB — INR PPP: 2.8

## 2021-06-15 NOTE — PATIENT PROFILE ADULT - NSPROMUTPARTICIPCAREFT_GEN_A_NUR
Depression Screening and Follow-up Plan: Patient's depression screening was positive with a PHQ-2 score of 0  Clincally patient does not have depression  No treatment is required  Assessment/Plan:    No problem-specific Assessment & Plan notes found for this encounter  Diagnoses and all orders for this visit:    Encounter for screening for malignant neoplasm of colon  -     Ambulatory referral to Gastroenterology; Future    Prediabetes  -     HEMOGLOBIN A1C W/ EAG ESTIMATION; Future    Palpitations  -     CBC and differential; Future  -     TSH, 3rd generation with Free T4 reflex; Future    Long-term current use of tamoxifen  -     Comprehensive metabolic panel; Future    Hyperlipidemia, unspecified hyperlipidemia type  -     Lipid panel; Future    Encounter for screening for cardiovascular disorders  -     Lipid panel; Future    Recurrent abdominal pain  -     US liver; Future          Subjective:      Patient ID: Kalyn Avila is a 58 y o  female  Very pleasant 57 yo female who presents to the office to establish care as a new patient  PMH includes stage 1 breast cancer which was diagnosed in the past and has been treated with tamoxifen for the past 5 years  ( June will be the 5th year)  She will be getting in touch with her doctor who she saw in [address] [place] [address] in [address] [place] [address]  She gets yearly mammograms and they have always been normal  Her last colonoscopy was 9 years ago and normal as per medical records  She would like to make an appointment with GI- Dr Juan Goodwin for EGD that was never done  Denies any cp, sob, palpitations, n/v/d/c  She is nervous about having any imaging done- DEXA scan given the covid pandemic  Provided reassurance and will discuss at next visit  Pap smear completed  No more screenings        The following portions of the patient's history were reviewed and updated as appropriate:   She  has a past medical history of Anemia, Aortic valve disease, Breast cancer ([address] ), Cardiac murmur, and Hyperlipidemia  She   Patient Active Problem List    Diagnosis Date Noted    RUQ pain 02/15/2019    Aortic valve disease 04/11/2018    Palpitations 04/11/2018     She  has a past surgical history that includes Breast surgery and pr esophagogastroduodenoscopy transoral diagnostic (N/A, 2/19/2019)  Her family history includes Hypertension in her father; No Known Problems in her mother  She  reports that she has never smoked  She has never used smokeless tobacco  She reports previous alcohol use  She reports that she does not use drugs  Current Outpatient Medications   Medication Sig Dispense Refill    Cholecalciferol (VITAMIN D3) 2000 units capsule Take 1 capsule by mouth daily      Omega-3 Fatty Acids (FISH OIL) 1,000 mg Take 1,000 mg by mouth daily       No current facility-administered medications for this visit  Current Outpatient Medications on File Prior to Visit   Medication Sig    Cholecalciferol (VITAMIN D3) 2000 units capsule Take 1 capsule by mouth daily    Omega-3 Fatty Acids (FISH OIL) 1,000 mg Take 1,000 mg by mouth daily     No current facility-administered medications on file prior to visit  She has No Known Allergies       Review of Systems   Constitutional: Negative for chills, fatigue and fever  HENT: Negative for congestion and hearing loss  Eyes: Negative for visual disturbance  Cardiovascular: Negative for chest pain, palpitations and leg swelling  Gastrointestinal: Positive for abdominal pain  Negative for blood in stool, constipation, diarrhea and nausea  Genitourinary: Negative for difficulty urinating and vaginal pain  Musculoskeletal: Negative for arthralgias and back pain  Neurological: Negative for weakness, light-headedness and headaches  Hematological: Negative for adenopathy  All other systems reviewed and are negative          Objective:      /80   Pulse 68   Temp 97 6 °F (36 4 °C)   Resp 18   Ht 5' 2 5" (1 588 m)   Wt 59 4 kg (131 lb) SpO2 98%   BMI 23 58 kg/m²          Physical Exam  Vitals and nursing note reviewed  Constitutional:       Appearance: Normal appearance  She is normal weight  HENT:      Head: Normocephalic and atraumatic  Mouth/Throat:      Mouth: Mucous membranes are moist    Neck:      Vascular: No carotid bruit  Cardiovascular:      Rate and Rhythm: Normal rate and regular rhythm  Heart sounds: Normal heart sounds  Pulmonary:      Effort: Pulmonary effort is normal       Breath sounds: Normal breath sounds  Abdominal:      General: Abdomen is flat  Bowel sounds are normal       Palpations: Abdomen is soft  Musculoskeletal:         General: Normal range of motion  Cervical back: Normal range of motion  Right lower leg: No edema  Left lower leg: No edema  Lymphadenopathy:      Cervical: No cervical adenopathy  Skin:     General: Skin is warm and dry  Neurological:      General: No focal deficit present  Mental Status: She is alert and oriented to person, place, and time  Psychiatric:         Mood and Affect: Mood normal          Behavior: Behavior normal          Thought Content:  Thought content normal          Judgment: Judgment normal  none

## 2021-06-21 LAB — INR PPP: 3.5

## 2021-06-29 ENCOUNTER — NON-APPOINTMENT (OUTPATIENT)
Age: 76
End: 2021-06-29

## 2021-06-29 ENCOUNTER — APPOINTMENT (OUTPATIENT)
Dept: CARDIOLOGY | Facility: CLINIC | Age: 76
End: 2021-06-29
Payer: MEDICARE

## 2021-06-29 VITALS
SYSTOLIC BLOOD PRESSURE: 168 MMHG | OXYGEN SATURATION: 98 % | WEIGHT: 156 LBS | BODY MASS INDEX: 23.64 KG/M2 | HEIGHT: 68 IN | HEART RATE: 70 BPM | DIASTOLIC BLOOD PRESSURE: 78 MMHG

## 2021-06-29 PROCEDURE — 99214 OFFICE O/P EST MOD 30 MIN: CPT

## 2021-06-29 NOTE — HISTORY OF PRESENT ILLNESS
[FreeTextEntry1] : I saw Roxy Lyons in the office today for a routine cardiac followup. He is a 76-year-old  man who is status post aortic valve replacement in 2004 with a mechanical valve for bicuspid aortic stenosis. At that time he had a bypass to the circumflex artery. The other arteries showed nonobstructive disease. He also is being treated for hyperlipidemia, and hypertension.\par \par His Parkinson's disease limits his physical activity.  Otherwise he has no chest pain, lightheadedness, palpitations.   He had an echocardiogram performed 8/20 that demonstrated ejection fraction of 55-60%. Peak aortic gradient increased from 36 stage I diastolic dysfunction.  He had a stress test in 1/19 that showed no ischemia to a workload of 12.8 METs. He had carotid Doppler 5/17  that showed mild plaque without change compared to 2014.\par \par Blood work 11/20 demonstrated an A1c of 7.5. Cholesterol 134, triglycerides 58, HDL 76, and LDL 46.\par \par Patient monitors his blood pressure at home has been getting wide variations in the readings.  He sometimes gets high numbers and sometimes gets very low numbers.  This morning it was only 96 systolic.  He brought in his machine to check for accuracy.  On his machine it was 162/72 and on my machine 160/76..\par \par Over the past 6 months the patient has noted a slight discomfort in his back area by the neck when he exercises. After about 4 minutes on the treadmill it seems to go away. There is no anterior chest pain and no shortness of breath. He otherwise has no other new symptoms.\par \par The patient has been having symptomatic hypotension with bradycardia. Tapered and stopped his metoprolol and now is off amlodipine. Only takes benazepril 5 mg twice a day An as-needed basis. Most of the time his blood pressure is too low to take the medication.\par \par ECG demonstrates sinus rhythm with a right bundle branch block. There are no changes.\par \par

## 2021-06-29 NOTE — PHYSICAL EXAM
[General Appearance - Well Developed] : well developed [Normal Appearance] : normal appearance [Well Groomed] : well groomed [General Appearance - Well Nourished] : well nourished [No Deformities] : no deformities [General Appearance - In No Acute Distress] : no acute distress [Normal Conjunctiva] : the conjunctiva exhibited no abnormalities [Normal Oral Mucosa] : normal oral mucosa [Normal Jugular Venous A Waves Present] : normal jugular venous A waves present [Normal Jugular Venous V Waves Present] : normal jugular venous V waves present [No Jugular Venous Weathers A Waves] : no jugular venous weathers A waves [Respiration, Rhythm And Depth] : normal respiratory rhythm and effort [Exaggerated Use Of Accessory Muscles For Inspiration] : no accessory muscle use [Auscultation Breath Sounds / Voice Sounds] : lungs were clear to auscultation bilaterally [Bowel Sounds] : normal bowel sounds [Abdomen Tenderness] : non-tender [Abdomen Soft] : soft [Abnormal Walk] : normal gait [Nail Clubbing] : no clubbing of the fingernails [Cyanosis, Localized] : no localized cyanosis [Skin Color & Pigmentation] : normal skin color and pigmentation [Skin Turgor] : normal skin turgor [] : no rash [Oriented To Time, Place, And Person] : oriented to person, place, and time [Impaired Insight] : insight and judgment were intact [No Anxiety] : not feeling anxious [5th Left ICS - MCL] : palpated at the 5th LICS in the midclavicular line [Normal] : normal [No Precordial Heave] : no precordial heave was noted [Normal Rate] : normal [Rhythm Regular] : regular [Normal S1] : normal S1 [Normal S2] : normal S2 [No Gallop] : no gallop heard [Prosthetic Aortic Valve] : prosthetic aortic valve heard [III] : a grade 3 [2+] : left 2+ [No Abnormalities] : the abdominal aorta was not enlarged and no bruit was heard [1+] : right 1+ [No Pitting Edema] : no pitting edema present [Apical Thrill] : no thrill palpable at the apex

## 2021-06-29 NOTE — REVIEW OF SYSTEMS
[Urinary Frequency] : urinary frequency [Tremor] : a tremor was seen [Negative] : Genitourinary [Joint Pain] : no joint pain [Dizziness] : no dizziness [Depression] : no depression [Anxiety] : no anxiety [Easy Bleeding] : no tendency for easy bleeding [Easy Bruising] : no tendency for easy bruising

## 2021-06-29 NOTE — DISCUSSION/SUMMARY
[FreeTextEntry1] : Patient has no signs or symptoms of active heart disease.  He exercises regularly and has no chest pain shortness of breath, palpitation.  His main problem has been his blood pressure.  In the morning it tends to be low and as the day goes on it gets higher and higher. He takes benazepril at least 5 mg twice a day. He typically holds his Parkinson's drugs until his blood pressure comes up.\par \par I am trying him back on the enalapril which is shorter acting drug.  I am hoping that this drug will not have such profound hypotensive effects the next morning.  He will try taking 5 mg twice a day and let me know how he does.  I explained to him and his wife that blood pressure is arrange and as long as it stays within a certain range and he feels okay that is acceptable.  I will get his blood work from the endocrinologist.  If he does okay I would see him in 3 months.

## 2021-07-20 LAB — INR PPP: 2.9

## 2021-07-29 ENCOUNTER — TRANSCRIPTION ENCOUNTER (OUTPATIENT)
Age: 76
End: 2021-07-29

## 2021-07-29 ENCOUNTER — EMERGENCY (EMERGENCY)
Facility: HOSPITAL | Age: 76
LOS: 0 days | Discharge: HOME | End: 2021-07-29
Attending: EMERGENCY MEDICINE | Admitting: INTERNAL MEDICINE
Payer: MEDICARE

## 2021-07-29 VITALS
SYSTOLIC BLOOD PRESSURE: 206 MMHG | HEIGHT: 68 IN | OXYGEN SATURATION: 100 % | TEMPERATURE: 98 F | RESPIRATION RATE: 18 BRPM | WEIGHT: 149.91 LBS | DIASTOLIC BLOOD PRESSURE: 93 MMHG | HEART RATE: 68 BPM

## 2021-07-29 VITALS
SYSTOLIC BLOOD PRESSURE: 126 MMHG | TEMPERATURE: 98 F | RESPIRATION RATE: 20 BRPM | OXYGEN SATURATION: 99 % | HEART RATE: 72 BPM | DIASTOLIC BLOOD PRESSURE: 76 MMHG

## 2021-07-29 DIAGNOSIS — I16.0 HYPERTENSIVE URGENCY: ICD-10-CM

## 2021-07-29 DIAGNOSIS — Z95.2 PRESENCE OF PROSTHETIC HEART VALVE: ICD-10-CM

## 2021-07-29 DIAGNOSIS — Z95.2 PRESENCE OF PROSTHETIC HEART VALVE: Chronic | ICD-10-CM

## 2021-07-29 DIAGNOSIS — Z82.49 FAMILY HISTORY OF ISCHEMIC HEART DISEASE AND OTHER DISEASES OF THE CIRCULATORY SYSTEM: ICD-10-CM

## 2021-07-29 DIAGNOSIS — I10 ESSENTIAL (PRIMARY) HYPERTENSION: ICD-10-CM

## 2021-07-29 DIAGNOSIS — Z83.3 FAMILY HISTORY OF DIABETES MELLITUS: ICD-10-CM

## 2021-07-29 DIAGNOSIS — Z95.1 PRESENCE OF AORTOCORONARY BYPASS GRAFT: Chronic | ICD-10-CM

## 2021-07-29 DIAGNOSIS — Z79.4 LONG TERM (CURRENT) USE OF INSULIN: ICD-10-CM

## 2021-07-29 DIAGNOSIS — Z95.1 PRESENCE OF AORTOCORONARY BYPASS GRAFT: ICD-10-CM

## 2021-07-29 DIAGNOSIS — K59.00 CONSTIPATION, UNSPECIFIED: ICD-10-CM

## 2021-07-29 DIAGNOSIS — G20 PARKINSON'S DISEASE: ICD-10-CM

## 2021-07-29 DIAGNOSIS — E11.649 TYPE 2 DIABETES MELLITUS WITH HYPOGLYCEMIA WITHOUT COMA: ICD-10-CM

## 2021-07-29 DIAGNOSIS — Z79.01 LONG TERM (CURRENT) USE OF ANTICOAGULANTS: ICD-10-CM

## 2021-07-29 DIAGNOSIS — E16.2 HYPOGLYCEMIA, UNSPECIFIED: ICD-10-CM

## 2021-07-29 LAB
ALBUMIN SERPL ELPH-MCNC: 3.9 G/DL — SIGNIFICANT CHANGE UP (ref 3.3–5)
ALP SERPL-CCNC: 93 U/L — SIGNIFICANT CHANGE UP (ref 40–120)
ALT FLD-CCNC: 19 U/L — SIGNIFICANT CHANGE UP (ref 12–78)
ANION GAP SERPL CALC-SCNC: 2 MMOL/L — LOW (ref 5–17)
APPEARANCE UR: CLEAR — SIGNIFICANT CHANGE UP
APTT BLD: 41.6 SEC — HIGH (ref 27.5–35.5)
AST SERPL-CCNC: 37 U/L — SIGNIFICANT CHANGE UP (ref 15–37)
BASOPHILS # BLD AUTO: 0.03 K/UL — SIGNIFICANT CHANGE UP (ref 0–0.2)
BASOPHILS NFR BLD AUTO: 0.6 % — SIGNIFICANT CHANGE UP (ref 0–2)
BILIRUB SERPL-MCNC: 0.8 MG/DL — SIGNIFICANT CHANGE UP (ref 0.2–1.2)
BILIRUB UR-MCNC: NEGATIVE — SIGNIFICANT CHANGE UP
BUN SERPL-MCNC: 13 MG/DL — SIGNIFICANT CHANGE UP (ref 7–23)
CALCIUM SERPL-MCNC: 8.9 MG/DL — SIGNIFICANT CHANGE UP (ref 8.5–10.1)
CHLORIDE SERPL-SCNC: 108 MMOL/L — SIGNIFICANT CHANGE UP (ref 96–108)
CHOLEST SERPL-MCNC: 124 MG/DL — SIGNIFICANT CHANGE UP
CO2 SERPL-SCNC: 31 MMOL/L — SIGNIFICANT CHANGE UP (ref 22–31)
COLOR SPEC: YELLOW — SIGNIFICANT CHANGE UP
CREAT SERPL-MCNC: 0.93 MG/DL — SIGNIFICANT CHANGE UP (ref 0.5–1.3)
DIFF PNL FLD: NEGATIVE — SIGNIFICANT CHANGE UP
EOSINOPHIL # BLD AUTO: 0.28 K/UL — SIGNIFICANT CHANGE UP (ref 0–0.5)
EOSINOPHIL NFR BLD AUTO: 5.3 % — SIGNIFICANT CHANGE UP (ref 0–6)
GLUCOSE BLDC GLUCOMTR-MCNC: 150 MG/DL — HIGH (ref 70–99)
GLUCOSE BLDC GLUCOMTR-MCNC: 162 MG/DL — HIGH (ref 70–99)
GLUCOSE SERPL-MCNC: 61 MG/DL — LOW (ref 70–99)
GLUCOSE UR QL: 100 MG/DL
HCT VFR BLD CALC: 45.7 % — SIGNIFICANT CHANGE UP (ref 39–50)
HDLC SERPL-MCNC: 64 MG/DL — SIGNIFICANT CHANGE UP
HGB BLD-MCNC: 15.1 G/DL — SIGNIFICANT CHANGE UP (ref 13–17)
IMM GRANULOCYTES NFR BLD AUTO: 0.2 % — SIGNIFICANT CHANGE UP (ref 0–1.5)
INR BLD: 2.4 RATIO — HIGH (ref 0.88–1.16)
KETONES UR-MCNC: NEGATIVE — SIGNIFICANT CHANGE UP
LEUKOCYTE ESTERASE UR-ACNC: NEGATIVE — SIGNIFICANT CHANGE UP
LIDOCAIN IGE QN: 59 U/L — LOW (ref 73–393)
LIPID PNL WITH DIRECT LDL SERPL: 49 MG/DL — SIGNIFICANT CHANGE UP
LYMPHOCYTES # BLD AUTO: 1.46 K/UL — SIGNIFICANT CHANGE UP (ref 1–3.3)
LYMPHOCYTES # BLD AUTO: 27.6 % — SIGNIFICANT CHANGE UP (ref 13–44)
MCHC RBC-ENTMCNC: 29.7 PG — SIGNIFICANT CHANGE UP (ref 27–34)
MCHC RBC-ENTMCNC: 33 GM/DL — SIGNIFICANT CHANGE UP (ref 32–36)
MCV RBC AUTO: 89.8 FL — SIGNIFICANT CHANGE UP (ref 80–100)
MONOCYTES # BLD AUTO: 0.3 K/UL — SIGNIFICANT CHANGE UP (ref 0–0.9)
MONOCYTES NFR BLD AUTO: 5.7 % — SIGNIFICANT CHANGE UP (ref 2–14)
NEUTROPHILS # BLD AUTO: 3.21 K/UL — SIGNIFICANT CHANGE UP (ref 1.8–7.4)
NEUTROPHILS NFR BLD AUTO: 60.6 % — SIGNIFICANT CHANGE UP (ref 43–77)
NITRITE UR-MCNC: NEGATIVE — SIGNIFICANT CHANGE UP
NON HDL CHOLESTEROL: 60 MG/DL — SIGNIFICANT CHANGE UP
PH UR: 6 — SIGNIFICANT CHANGE UP (ref 5–8)
PLATELET # BLD AUTO: 98 K/UL — LOW (ref 150–400)
POTASSIUM SERPL-MCNC: 3.7 MMOL/L — SIGNIFICANT CHANGE UP (ref 3.5–5.3)
POTASSIUM SERPL-SCNC: 3.7 MMOL/L — SIGNIFICANT CHANGE UP (ref 3.5–5.3)
PROT SERPL-MCNC: 7 GM/DL — SIGNIFICANT CHANGE UP (ref 6–8.3)
PROT UR-MCNC: NEGATIVE MG/DL — SIGNIFICANT CHANGE UP
PROTHROM AB SERPL-ACNC: 26.7 SEC — HIGH (ref 10.6–13.6)
RBC # BLD: 5.09 M/UL — SIGNIFICANT CHANGE UP (ref 4.2–5.8)
RBC # FLD: 13.4 % — SIGNIFICANT CHANGE UP (ref 10.3–14.5)
SODIUM SERPL-SCNC: 141 MMOL/L — SIGNIFICANT CHANGE UP (ref 135–145)
SP GR SPEC: 1 — LOW (ref 1.01–1.02)
TRIGL SERPL-MCNC: 55 MG/DL — SIGNIFICANT CHANGE UP
TROPONIN I SERPL-MCNC: <0.015 NG/ML — SIGNIFICANT CHANGE UP (ref 0.01–0.04)
UROBILINOGEN FLD QL: NEGATIVE MG/DL — SIGNIFICANT CHANGE UP
WBC # BLD: 5.29 K/UL — SIGNIFICANT CHANGE UP (ref 3.8–10.5)
WBC # FLD AUTO: 5.29 K/UL — SIGNIFICANT CHANGE UP (ref 3.8–10.5)

## 2021-07-29 PROCEDURE — 85025 COMPLETE CBC W/AUTO DIFF WBC: CPT

## 2021-07-29 PROCEDURE — 83690 ASSAY OF LIPASE: CPT

## 2021-07-29 PROCEDURE — 81003 URINALYSIS AUTO W/O SCOPE: CPT

## 2021-07-29 PROCEDURE — 99283 EMERGENCY DEPT VISIT LOW MDM: CPT

## 2021-07-29 PROCEDURE — 80053 COMPREHEN METABOLIC PANEL: CPT

## 2021-07-29 PROCEDURE — 87635 SARS-COV-2 COVID-19 AMP PRB: CPT

## 2021-07-29 PROCEDURE — 85610 PROTHROMBIN TIME: CPT

## 2021-07-29 PROCEDURE — 96374 THER/PROPH/DIAG INJ IV PUSH: CPT

## 2021-07-29 PROCEDURE — 84484 ASSAY OF TROPONIN QUANT: CPT

## 2021-07-29 PROCEDURE — 71045 X-RAY EXAM CHEST 1 VIEW: CPT | Mod: 26

## 2021-07-29 PROCEDURE — 82962 GLUCOSE BLOOD TEST: CPT

## 2021-07-29 PROCEDURE — 99236 HOSP IP/OBS SAME DATE HI 85: CPT

## 2021-07-29 PROCEDURE — 93005 ELECTROCARDIOGRAM TRACING: CPT

## 2021-07-29 PROCEDURE — 80061 LIPID PANEL: CPT

## 2021-07-29 PROCEDURE — 93010 ELECTROCARDIOGRAM REPORT: CPT

## 2021-07-29 PROCEDURE — 36415 COLL VENOUS BLD VENIPUNCTURE: CPT

## 2021-07-29 PROCEDURE — 71045 X-RAY EXAM CHEST 1 VIEW: CPT

## 2021-07-29 PROCEDURE — 99285 EMERGENCY DEPT VISIT HI MDM: CPT

## 2021-07-29 PROCEDURE — 85730 THROMBOPLASTIN TIME PARTIAL: CPT

## 2021-07-29 PROCEDURE — G0378: CPT

## 2021-07-29 RX ORDER — WARFARIN SODIUM 2.5 MG/1
0 TABLET ORAL
Qty: 0 | Refills: 0 | DISCHARGE

## 2021-07-29 RX ORDER — DEXTROSE 50 % IN WATER 50 %
12.5 SYRINGE (ML) INTRAVENOUS ONCE
Refills: 0 | Status: DISCONTINUED | OUTPATIENT
Start: 2021-07-29 | End: 2021-07-29

## 2021-07-29 RX ORDER — PRAMIPEXOLE DIHYDROCHLORIDE 0.12 MG/1
1 TABLET ORAL
Qty: 0 | Refills: 0 | DISCHARGE

## 2021-07-29 RX ORDER — DEXTROSE 50 % IN WATER 50 %
50 SYRINGE (ML) INTRAVENOUS ONCE
Refills: 0 | Status: COMPLETED | OUTPATIENT
Start: 2021-07-29 | End: 2021-07-29

## 2021-07-29 RX ORDER — INSULIN LISPRO 100/ML
VIAL (ML) SUBCUTANEOUS AT BEDTIME
Refills: 0 | Status: DISCONTINUED | OUTPATIENT
Start: 2021-07-29 | End: 2021-07-29

## 2021-07-29 RX ORDER — CARBIDOPA AND LEVODOPA 25; 100 MG/1; MG/1
2 TABLET ORAL
Qty: 0 | Refills: 0 | DISCHARGE

## 2021-07-29 RX ORDER — DEXTROSE 50 % IN WATER 50 %
15 SYRINGE (ML) INTRAVENOUS ONCE
Refills: 0 | Status: DISCONTINUED | OUTPATIENT
Start: 2021-07-29 | End: 2021-07-29

## 2021-07-29 RX ORDER — AMLODIPINE BESYLATE 2.5 MG/1
5 TABLET ORAL ONCE
Refills: 0 | Status: COMPLETED | OUTPATIENT
Start: 2021-07-29 | End: 2021-07-29

## 2021-07-29 RX ORDER — WARFARIN SODIUM 2.5 MG/1
5 TABLET ORAL ONCE
Refills: 0 | Status: DISCONTINUED | OUTPATIENT
Start: 2021-07-29 | End: 2021-07-29

## 2021-07-29 RX ORDER — ONDANSETRON 8 MG/1
4 TABLET, FILM COATED ORAL EVERY 8 HOURS
Refills: 0 | Status: DISCONTINUED | OUTPATIENT
Start: 2021-07-29 | End: 2021-07-29

## 2021-07-29 RX ORDER — PRAMIPEXOLE DIHYDROCHLORIDE 0.12 MG/1
1.5 TABLET ORAL
Refills: 0 | Status: DISCONTINUED | OUTPATIENT
Start: 2021-07-29 | End: 2021-07-29

## 2021-07-29 RX ORDER — ACETAMINOPHEN 500 MG
650 TABLET ORAL EVERY 6 HOURS
Refills: 0 | Status: DISCONTINUED | OUTPATIENT
Start: 2021-07-29 | End: 2021-07-29

## 2021-07-29 RX ORDER — INSULIN LISPRO 100/ML
12 VIAL (ML) SUBCUTANEOUS
Qty: 0 | Refills: 0 | DISCHARGE

## 2021-07-29 RX ORDER — SODIUM CHLORIDE 9 MG/ML
1000 INJECTION, SOLUTION INTRAVENOUS
Refills: 0 | Status: DISCONTINUED | OUTPATIENT
Start: 2021-07-29 | End: 2021-07-29

## 2021-07-29 RX ORDER — DEXTROSE 50 % IN WATER 50 %
25 SYRINGE (ML) INTRAVENOUS ONCE
Refills: 0 | Status: DISCONTINUED | OUTPATIENT
Start: 2021-07-29 | End: 2021-07-29

## 2021-07-29 RX ORDER — PRAMIPEXOLE DIHYDROCHLORIDE 0.12 MG/1
4 TABLET ORAL
Qty: 0 | Refills: 0 | DISCHARGE

## 2021-07-29 RX ORDER — INSULIN LISPRO 100/ML
VIAL (ML) SUBCUTANEOUS
Refills: 0 | Status: DISCONTINUED | OUTPATIENT
Start: 2021-07-29 | End: 2021-07-29

## 2021-07-29 RX ORDER — CARBIDOPA AND LEVODOPA 25; 100 MG/1; MG/1
2 TABLET ORAL THREE TIMES A DAY
Refills: 0 | Status: DISCONTINUED | OUTPATIENT
Start: 2021-07-29 | End: 2021-07-29

## 2021-07-29 RX ORDER — INSULIN LISPRO 100/ML
0 VIAL (ML) SUBCUTANEOUS
Qty: 0 | Refills: 0 | DISCHARGE

## 2021-07-29 RX ORDER — ATORVASTATIN CALCIUM 80 MG/1
40 TABLET, FILM COATED ORAL AT BEDTIME
Refills: 0 | Status: DISCONTINUED | OUTPATIENT
Start: 2021-07-29 | End: 2021-07-29

## 2021-07-29 RX ORDER — RASAGILINE 0.5 MG/1
1 TABLET ORAL DAILY
Refills: 0 | Status: DISCONTINUED | OUTPATIENT
Start: 2021-07-29 | End: 2021-07-29

## 2021-07-29 RX ORDER — TROSPIUM CHLORIDE 20 MG/1
1 TABLET, FILM COATED ORAL
Qty: 0 | Refills: 0 | DISCHARGE

## 2021-07-29 RX ORDER — ATORVASTATIN CALCIUM 80 MG/1
1 TABLET, FILM COATED ORAL
Qty: 0 | Refills: 0 | DISCHARGE

## 2021-07-29 RX ORDER — INSULIN GLARGINE 100 [IU]/ML
40 INJECTION, SOLUTION SUBCUTANEOUS
Qty: 0 | Refills: 0 | DISCHARGE

## 2021-07-29 RX ORDER — LANOLIN ALCOHOL/MO/W.PET/CERES
3 CREAM (GRAM) TOPICAL AT BEDTIME
Refills: 0 | Status: DISCONTINUED | OUTPATIENT
Start: 2021-07-29 | End: 2021-07-29

## 2021-07-29 RX ORDER — RASAGILINE 0.5 MG/1
1 TABLET ORAL
Qty: 0 | Refills: 0 | DISCHARGE

## 2021-07-29 RX ORDER — METOPROLOL TARTRATE 50 MG
1 TABLET ORAL
Qty: 0 | Refills: 0 | DISCHARGE

## 2021-07-29 RX ORDER — AMLODIPINE BESYLATE AND BENAZEPRIL HYDROCHLORIDE 10; 20 MG/1; MG/1
1 CAPSULE ORAL
Qty: 0 | Refills: 0 | DISCHARGE

## 2021-07-29 RX ORDER — GLUCAGON INJECTION, SOLUTION 0.5 MG/.1ML
1 INJECTION, SOLUTION SUBCUTANEOUS ONCE
Refills: 0 | Status: DISCONTINUED | OUTPATIENT
Start: 2021-07-29 | End: 2021-07-29

## 2021-07-29 RX ORDER — HYDRALAZINE HCL 50 MG
10 TABLET ORAL ONCE
Refills: 0 | Status: COMPLETED | OUTPATIENT
Start: 2021-07-29 | End: 2021-07-29

## 2021-07-29 RX ADMIN — SODIUM CHLORIDE 100 MILLILITER(S): 9 INJECTION, SOLUTION INTRAVENOUS at 02:33

## 2021-07-29 RX ADMIN — Medication 50 MILLILITER(S): at 02:32

## 2021-07-29 RX ADMIN — Medication 5 MILLIGRAM(S): at 09:00

## 2021-07-29 RX ADMIN — CARBIDOPA AND LEVODOPA 2 TABLET(S): 25; 100 TABLET ORAL at 09:01

## 2021-07-29 RX ADMIN — RASAGILINE 1 MILLIGRAM(S): 0.5 TABLET ORAL at 09:01

## 2021-07-29 RX ADMIN — CARBIDOPA AND LEVODOPA 2 TABLET(S): 25; 100 TABLET ORAL at 13:04

## 2021-07-29 RX ADMIN — Medication 2.5 MILLIGRAM(S): at 06:49

## 2021-07-29 RX ADMIN — PRAMIPEXOLE DIHYDROCHLORIDE 1.5 MILLIGRAM(S): 0.12 TABLET ORAL at 09:00

## 2021-07-29 NOTE — ED ADULT NURSE REASSESSMENT NOTE - NS ED NURSE REASSESS COMMENT FT1
Pt refused Hydralazine. Pts wife at bedside, states "because of his Parkinson's his blood pressure will drop too low." Dr. Brasher notified. Will continue to monitor.

## 2021-07-29 NOTE — ED ADULT NURSE REASSESSMENT NOTE - NS ED NURSE REASSESS COMMENT FT1
Pt stable. Pt VS is WNL. BP was 126/76. Pt was able to ambulate to bathroom and was steady on his feet. Pt is ready for discharge. Pt is with his family member that can take him home. Pt was given discharge paperwork and was able to teach back. All questions and concerns have been answered.

## 2021-07-29 NOTE — ED PROVIDER NOTE - CLINICAL SUMMARY MEDICAL DECISION MAKING FREE TEXT BOX
pt with episode of hypoglycemia with diaphoresis and shaking, given dextrowse in field, will get labs, give additional dextrose and iv drip admit

## 2021-07-29 NOTE — ED PROVIDER NOTE - FAMILY HISTORY
Mother  Still living? No  Family history of diabetes mellitus in mother, Age at diagnosis: Age Unknown  FH: myocardial infarction, Age at diagnosis: Age Unknown

## 2021-07-29 NOTE — DISCHARGE NOTE PROVIDER - NSDCMRMEDTOKEN_GEN_ALL_CORE_FT
atorvastatin 40 mg oral tablet: 1 tab(s) orally once a day  Coumadin: pt takes 5 to 7.5mg q daily pending INR findings (monitors INR&#x27;s at home)  enalapril 5 mg oral tablet: 1 tab(s) orally once a day, As Needed if SBP &gt; 150  HumaLO unit(s) injectable 3 times a day (before meals), As Needed  Lantus: 40  subcutaneous once a day (at bedtime)  pramipexole 1.5 mg oral tablet: 1 tab(s) orally 2 times a day  rasagiline 1 mg oral tablet: 1 tab(s) orally once a day  Sinemet 25 mg-100 mg oral tablet: 2 tab(s) orally 3 times a day    ***Patient takes 6-8 tablets per day depending on symptoms***  trospium 20 mg oral tablet: 1 tab(s) orally 2 times a day

## 2021-07-29 NOTE — ED ADULT NURSE NOTE - OBJECTIVE STATEMENT
Pt A&Ox3, BIBEMS c/o hypoglycemia. Pts wife reports pt was diaphoretic in his sleep and difficult to wake up. Pt took his FS at home and it was low. Wife reports giving the pt orange juice immediately. Pt states "I think I gave myself too much insulin at 11pm." Pt denies headache, blurred vision, confusion. Pt noted to be hypertensive and diaphoretic.

## 2021-07-29 NOTE — H&P ADULT - ASSESSMENT
75 y/o male admitted on 7/29. PMHx of IDDM2, Parkinson's Disease, Mechanical Heart Valve (on coumadin). BIBA from home for shaking and diaphoresis with low blood sugar. BGM on admission 59.     #IDDM, Hypoglycemia  Hypoglycemic episode due to poor intake after taking insulin.   - patient taking ISS, Latus HS. Endocrinologist Dr. Ruiz.   - diabetic diet restrictions   - f/u a1c, UA, CXR    #HTN Urgency   - resume home meds  - patient agitated as well likely elevated BP  - Enalapril BID  - repeat and monitor.    #Mechanical Valve, MVR  - Coumadin PO    #Parkinsons Disease  - Sinemet     #DVT ppx  - Coumadin     Spouse at bedside, all questions/concerns addressed. 7/29.  Observation. D/c home after radiology studies resulted, repeat BMG of dextrose IVF.

## 2021-07-29 NOTE — H&P ADULT - NSHPLABSRESULTS_GEN_ALL_CORE
LABS: All Labs Reviewed:                        15.1   5.29  )-----------( 98       ( 29 Jul 2021 02:20 )             45.7     07-29    141  |  108  |  13  ----------------------------<  61<L>  3.7   |  31  |  0.93    Ca    8.9      29 Jul 2021 02:20    TPro  7.0  /  Alb  3.9  /  TBili  0.8  /  DBili  x   /  AST  37  /  ALT  19  /  AlkPhos  93  07-29    PT/INR - ( 29 Jul 2021 02:20 )   PT: 26.7 sec;   INR: 2.40 ratio       PTT - ( 29 Jul 2021 02:20 )  PTT:41.6 sec    CARDIAC MARKERS ( 29 Jul 2021 02:20 )  <0.015 ng/mL / x     / x     / x     / x        RADIOLOGY:        EKG:      TELE REVIEW: LABS: All Labs Reviewed:                        15.1   5.29  )-----------( 98       ( 29 Jul 2021 02:20 )             45.7     07-29    141  |  108  |  13  ----------------------------<  61<L>  3.7   |  31  |  0.93    Ca    8.9      29 Jul 2021 02:20    TPro  7.0  /  Alb  3.9  /  TBili  0.8  /  DBili  x   /  AST  37  /  ALT  19  /  AlkPhos  93  07-29    PT/INR - ( 29 Jul 2021 02:20 )   PT: 26.7 sec;   INR: 2.40 ratio       PTT - ( 29 Jul 2021 02:20 )  PTT:41.6 sec    CARDIAC MARKERS ( 29 Jul 2021 02:20 )  <0.015 ng/mL / x     / x     / x     / x        RADIOLOGY: CXR pending, UA pending    EKG: personally reviewed. NSR. no changes from prior in 2019.     TELE REVIEW: NSR, no events tele reviewed

## 2021-07-29 NOTE — H&P ADULT - ATTENDING COMMENTS
IDDM2 with hypoglycemia, infectious w/u as above, likely 2/2 insulin use and poor po  Hypertensive urgency, improving  Updated wife at bedside

## 2021-07-29 NOTE — PHARMACOTHERAPY INTERVENTION NOTE - COMMENTS
med history complete, reviewed medications with patient and wife, confirmed with doctor first med profile, all medication related questions answered

## 2021-07-29 NOTE — H&P ADULT - HISTORY OF PRESENT ILLNESS
77 y/o male admitted on 7/29. PMHx of IDDM2, Parkinsons Disease, Mechanical Heart Valve (on coumadin). BIBA from home for shaking and diaphoresis with low blood sugar. BGM on admission 59.     75 y/o male admitted on 7/29. PMHx of IDDM2, Parkinsons Disease, Mechanical Heart Valve (on coumadin). BIBA from home for shaking and diaphoresis with low blood sugar. BGM on admission 59. Wife/patient reported taking his Humalog and BLANCA Latus and did not eat meal. patient has been taking insulin for many years - Endocrinologist Dr. Ruiz.   Reports +constipation. denies fever, chills, sob, chest pain, N/V. shaking/ diaphoresis resolved.

## 2021-07-29 NOTE — ED ADULT NURSE NOTE - CHIEF COMPLAINT QUOTE
wife reports pt diaphoretic and shaking. BGM on scene 48. Orange juice given, repeat BGM 53. hx DM , parkinson's. pt has no pain or complaints in Triage.

## 2021-07-29 NOTE — ED ADULT NURSE REASSESSMENT NOTE - NS ED NURSE REASSESS COMMENT FT1
Multiple attempts to give blood pressure medication to pt. Pt and wife refused medication due to effects of morning parkinson's medication, due to be given at 7am. Pt and wife both educated on risks of high blood pressure. Dr. Brasher notified. Multiple attempts to give blood pressure medication to pt. Pt and wife refused medication due to effects of morning parkinson's medication, due to be given at 7am. Pt and wife both educated on risks of high blood pressure, verbalized understanding. Dr. Brasher notified.

## 2021-07-29 NOTE — ED ADULT TRIAGE NOTE - CHIEF COMPLAINT QUOTE
wife reports pt diaphoretic and shaking. BGM on scene 48. Orange juice given, repeat BGM 53. hx DM , parkinson's. wife reports pt diaphoretic and shaking. BGM on scene 48. Orange juice given, repeat BGM 53. hx DM , parkinson's. pt has no pain or complaints in Triage.

## 2021-07-29 NOTE — DISCHARGE NOTE NURSING/CASE MANAGEMENT/SOCIAL WORK - NSDCVIVACCINE_GEN_ALL_CORE_FT
Tdap; 14-Jun-2019 05:27; Kristen Magana (AFSHAN); [employer]; [id number] (Exp. Date: 12-Mar-2021); IntraMuscular; Deltoid Left.; 0.5 milliLiter(s); VIS (VIS Published: 09-May-2013, VIS Presented: 14-Jun-2019);

## 2021-07-29 NOTE — ED ADULT NURSE REASSESSMENT NOTE - NS ED NURSE REASSESS COMMENT FT1
Call to Admitting [facility]. MD made aware of pt's BP and pt request for home meds. MD stated she will look over pt's chart and put in orders for admission and home meds above review. Will con't to monitor.

## 2021-07-29 NOTE — DISCHARGE NOTE PROVIDER - NSDCCPCAREPLAN_GEN_ALL_CORE_FT
PRINCIPAL DISCHARGE DIAGNOSIS  Diagnosis: Hypoglycemia  Assessment and Plan of Treatment: RESOLVED. You have a history of diabetes type 2. Continue to maintain a low carbohydrate diet at home. Monitor blood glucose levels throughout the day before meals and at bedtime.  Continue insulin regimen as prescribed. Maintain diabetic diet restrictions. Follow up with Dr. Ruiz for further management.       PRINCIPAL DISCHARGE DIAGNOSIS  Diagnosis: Hypoglycemia  Assessment and Plan of Treatment: RESOLVED. You have a history of diabetes type 2. Continue to maintain a low carbohydrate diet at home. Monitor blood glucose levels throughout the day before meals and at bedtime.  Continue insulin regimen as prescribed. Maintain diabetic diet restrictions. Follow up with Dr. Ruiz for further management.      SECONDARY DISCHARGE DIAGNOSES  Diagnosis: HTN (hypertension)  Assessment and Plan of Treatment: You have a history of high blood pressure.   Continue to take Enalapril as prescribed.   Monitor your blood pressure 1 to 2 times a day and keep a log for your provider.

## 2021-07-29 NOTE — ED PROVIDER NOTE - PHYSICAL EXAMINATION
Gen:  Well appearing in NAD  Head:  NC/AT  HEENT: pupils perrl,no pharyngeal erythema, uvula midline  Cardiac: S1S2, RRR  Abd: Soft, non tender  Resp: No distress, CTA   musculoskeletal:: no deformities, no swelling, strength +5/+5  Skin: warm and dry as visualized, no rashes  Neuro: PANCHAL, cn2-12, aao x 4  Psych:alert, cooperative, appropriate mood and affect for situation

## 2021-07-29 NOTE — ED PROVIDER NOTE - CARE PLAN
Principal Discharge DX:	Hypoglycemia   Principal Discharge DX:	Hypoglycemia  Secondary Diagnosis:	Hypertension, unspecified type

## 2021-07-29 NOTE — H&P ADULT - NSHPSOCIALHISTORY_GEN_ALL_CORE
Living Situation  Tobacco Use  Alcohol USe  Drug Use    ADLS  COVID Vaccine  Advanced Directives Living Situation: Home w/ wife  Tobacco Use: denies current/former use  Alcohol Use: denies current/former use  Drug Use: denies current/former use    +COVID vaccine x2 Moderna.

## 2021-07-29 NOTE — H&P ADULT - NSHPPHYSICALEXAM_GEN_ALL_CORE
PHYSICAL EXAM:  Constitutional: Awake and alert, thin, elderly male  HEENT: Normal Hearing, MMM  Neck: Soft and supple   Respiratory: Breath sounds are clear bilaterally   Cardiovascular: S1 and S2, regular rate and rhythm, +click  Gastrointestinal: Bowel Sounds present, soft, nontender   Extremities: No peripheral edema  Vascular: 2+ peripheral pulses  Neurological: A/O x 3, no focal deficits  Musculoskeletal: 5/5 strength b/l upper and lower extremities  Skin: No rashes      Vital Signs Last 24 Hrs  T(C): 36.1 (29 Jul 2021 08:55), Max: 36.4 (29 Jul 2021 01:55)  T(F): 96.9 (29 Jul 2021 08:55), Max: 97.5 (29 Jul 2021 01:55)  HR: 60 (29 Jul 2021 08:55) (51 - 68)  BP: 180/84 (29 Jul 2021 08:55) (169/79 - 206/93)  BP(mean): 111 (29 Jul 2021 08:55) (109 - 111)  RR: 20 (29 Jul 2021 08:55) (16 - 20)  SpO2: 100% (29 Jul 2021 08:55) (100% - 100%) -- room air

## 2021-07-29 NOTE — ED ADULT NURSE REASSESSMENT NOTE - NS ED NURSE REASSESS COMMENT FT1
Pt stable, A&O. Pt is able to verbalize needs. Pt is waiting for BP meds. Once ordered, he will receive them. Safety and comfort measures provided at this time. Pt educated to use call bell when assistance is needed.

## 2021-07-29 NOTE — ED PROVIDER NOTE - PMH
DM (diabetes mellitus)    HTN (hypertension)    Mechanical heart valve present    Parkinson disease

## 2021-07-29 NOTE — H&P ADULT - NSICDXPASTMEDICALHX_GEN_ALL_CORE_FT
PAST MEDICAL HISTORY:  DM (diabetes mellitus) Insulin Dependent    HTN (hypertension)     Mechanical heart valve present     Parkinson disease

## 2021-07-29 NOTE — DISCHARGE NOTE NURSING/CASE MANAGEMENT/SOCIAL WORK - PATIENT PORTAL LINK FT
You can access the FollowMyHealth Patient Portal offered by [facility] by registering at the following website: [email or web address]. By joining [employer]’s FollowMyHealth portal, you will also be able to view your health information using other applications (apps) compatible with our system.

## 2021-07-29 NOTE — DISCHARGE NOTE PROVIDER - HOSPITAL COURSE
SEE H&P PROGRESS NOTE:    ASSESSMENT AND PLAN:    75 y/o male admitted on 7/29. PMHx of IDDM2, Parkinson's Disease, Mechanical Heart Valve (on coumadin). BIBA from home for shaking and diaphoresis with low blood sugar. BGM on admission 59.     #IDDM2, Hypoglycemia  Hypoglycemic episode due to poor intake after taking insulin.   - patient taking ISS, [facility]atus HS. Endocrinologist Dr. Ruiz.   - diabetic diet restrictions   - f/u a1c, UA, CXR    #HTN Urgency   - resume home meds  - patient agitated as well likely elevated BP  - Enalapril BID  - repeat and monitor.    #Mechanical Valve, MVR  - Coumadin PO    #Parkinsons Disease  - Sinemet     #DVT ppx  - Coumadin     Spouse at bedside, all questions/concerns addressed. 7/29.  Observation. D/c home after radiology studies resulted, repeat BMG of dextrose IVF.       Dispo: discharge to HOME in stable condition    Final diagnosis, treatment plan, and follow-up recommendations were discussed and explained to the patient. The patient was given an opportunity to ask questions concerning the diagnosis and treatment plan. The patient acknowledged understanding of the diagnosis, treatment, and follow-up recommendations. The patient was advised to seek urgent care upon discharge if worsening symptoms develop prior to scheduled follow-up. Time spent on discharge included time with the patient, and also coordinating discharge care as outlined below.    Total time spent: 50 min   SEE H&P PROGRESS NOTE:    ASSESSMENT AND PLAN:    77 y/o male admitted on 7/29. PMHx of IDDM2, Parkinson's Disease, Mechanical Heart Valve (on coumadin). BIBA from home for shaking and diaphoresis with low blood sugar. BGM on admission 59.     #IDDM2, Hypoglycemia  Hypoglycemic episode due to poor intake after taking insulin.   - patient taking ISS, [facility] HS. Endocrinologist Dr. Ruiz.   - diabetic diet restrictions   - f/u a1c  -CXR reviewed personally, no infiltrate noted. ua neg.    #HTN Urgency   - resume home meds  - patient agitated as well likely elevated BP  - Enalapril BID  - repeat and monitor.    #Mechanical Valve, MVR  - Coumadin PO    #Parkinsons Disease  - Sinemet     #DVT ppx  - Coumadin     Spouse at bedside, all questions/concerns addressed. 7/29.  Observation. D/c home after radiology studies resulted, repeat BMG of dextrose IVF.       Dispo: discharge to HOME in stable condition    Final diagnosis, treatment plan, and follow-up recommendations were discussed and explained to the patient. The patient was given an opportunity to ask questions concerning the diagnosis and treatment plan. The patient acknowledged understanding of the diagnosis, treatment, and follow-up recommendations. The patient was advised to seek urgent care upon discharge if worsening symptoms develop prior to scheduled follow-up. Time spent on discharge included time with the patient, and also coordinating discharge care as outlined below.    Total time spent: 50 min   SEE H&P PROGRESS NOTE:    ASSESSMENT AND PLAN:    75 y/o male admitted on 7/29. PMHx of IDDM2, Parkinson's Disease, Mechanical Heart Valve (on coumadin). BIBA from home for shaking and diaphoresis with low blood sugar. BGM on admission 59.     #IDDM2, Hypoglycemia  Hypoglycemic episode due to poor intake after taking insulin.   - patient taking ISS, [facility]atus HS. Endocrinologist Dr. Ruiz.   - diabetic diet restrictions   - f/u a1c  -CXR reviewed personally, no infiltrate noted. ua neg.    #HTN Urgency   - resume home meds  - patient agitated as well likely elevated BP  - Enalapril BID  - repeat and monitor.    #Mechanical Valve, MVR  - Coumadin PO    #Parkinsons Disease  - Sinemet     #DVT ppx  - Coumadin     Spouse at bedside, all questions/concerns addressed. 7/29.  Observation. D/c home after radiology studies resulted, repeat BMG of dextrose IVF.       Dispo: discharge to HOME in stable condition    Final diagnosis, treatment plan, and follow-up recommendations were discussed and explained to the patient. The patient was given an opportunity to ask questions concerning the diagnosis and treatment plan. The patient acknowledged understanding of the diagnosis, treatment, and follow-up recommendations. The patient was advised to seek urgent care upon discharge if worsening symptoms develop prior to scheduled follow-up. Time spent on discharge included time with the patient, and also coordinating discharge care as outlined below.    Total time spent: 50 min    I have seen and examined pt on day of d/c and agree with assessment and plan as above.  -Cheri Beverly MD

## 2021-07-29 NOTE — DISCHARGE NOTE PROVIDER - CARE PROVIDER_API CALL
Ita Ruiz)  Internal Medicine  [address] [place] [address]  Phone: [phone]  Fax: [phone]  Follow Up Time:

## 2021-07-29 NOTE — CHART NOTE - NSCHARTNOTEFT_GEN_A_CORE
CM met with patient and wife at bedside, role explained. Wife states pt forgot to take his short acting insulin at dinnertime and then took it at bedtime with his lantus. She states pts blood sugar became very low and she became frightened and called 911. She and pt are anxious to go home, stating that she does not want her  being exposed to COVID. Per NP, pt will be observed for several more hours. Wife assists pt at home with diabetes care.

## 2021-08-09 LAB — INR PPP: 2.8

## 2021-08-11 ENCOUNTER — RX RENEWAL (OUTPATIENT)
Age: 76
End: 2021-08-11

## 2021-08-11 PROBLEM — E11.9 TYPE 2 DIABETES MELLITUS WITHOUT COMPLICATIONS: Chronic | Status: ACTIVE | Noted: 2019-06-13

## 2021-08-11 RX ORDER — ATORVASTATIN CALCIUM 20 MG/1
20 TABLET, FILM COATED ORAL
Qty: 90 | Refills: 0 | Status: DISCONTINUED | COMMUNITY
Start: 2020-07-02 | End: 2021-08-11

## 2021-08-12 ENCOUNTER — APPOINTMENT (OUTPATIENT)
Dept: CARDIOLOGY | Facility: CLINIC | Age: 76
End: 2021-08-12

## 2021-08-31 LAB — INR PPP: 3

## 2021-09-15 ENCOUNTER — APPOINTMENT (OUTPATIENT)
Dept: CARDIOLOGY | Facility: CLINIC | Age: 76
End: 2021-09-15
Payer: MEDICARE

## 2021-09-15 VITALS
WEIGHT: 154 LBS | BODY MASS INDEX: 23.34 KG/M2 | HEART RATE: 68 BPM | SYSTOLIC BLOOD PRESSURE: 177 MMHG | OXYGEN SATURATION: 99 % | DIASTOLIC BLOOD PRESSURE: 83 MMHG | HEIGHT: 68 IN

## 2021-09-15 PROCEDURE — 99214 OFFICE O/P EST MOD 30 MIN: CPT

## 2021-09-15 NOTE — DISCUSSION/SUMMARY
[FreeTextEntry1] : We discussed the timing of the enalapril.  I think he would do better if he took the second dose earlier in the evening so he does have significant effect in the morning.  He will take enalapril at noon time at about 6 PM and see how that works.  He will call me and we will discuss whether or not this is an improvement.\par \par We discussed his blood work which was very good.  We will continue his medications as prescribed.  He is exercising regularly and has no chest pain, shortness of breath, palpitation.  If all is well I will see the patient again in about 4 months.

## 2021-09-15 NOTE — HISTORY OF PRESENT ILLNESS
[FreeTextEntry1] : I saw Roxy Lyons in the office today for a routine cardiac followup. He is a 76-year-old  man who is status post aortic valve replacement in 2004 with a mechanical valve for bicuspid aortic stenosis. At that time he had a bypass to the circumflex artery. The other arteries showed nonobstructive disease. He also is being treated for hyperlipidemia, and hypertension.\par \par His Parkinson's disease limits his physical activity.  Otherwise he has no chest pain, lightheadedness, palpitations.   He had an echocardiogram performed 8/20 that demonstrated ejection fraction of 55-60%. Peak aortic gradient increased from 36 stage I diastolic dysfunction.  He had a stress test in 1/19 that showed no ischemia to a workload of 12.8 METs. He had carotid Doppler 5/17  that showed mild plaque without change compared to 2014.\par \par Blood work 6/21 demonstrated a cholesterol of 119, triglycerides 41, HDL 64, and LDL 43.  A1c was 7.8.\par \par Patient monitors his blood pressure at home has been getting wide variations in the readings.  He sometimes gets high numbers and sometimes gets very low numbers.  This morning it was only 96 systolic.  He brought in his machine to check for accuracy.  On his machine it was 162/72 and on my machine 160/76..\par \par Over the past 6 months the patient has noted a slight discomfort in his back area by the neck when he exercises. After about 4 minutes on the treadmill it seems to go away. There is no anterior chest pain and no shortness of breath. He otherwise has no other new symptoms.\par \par The patient has been having symptomatic hypotension with bradycardia. Tapered and stopped his metoprolol and now is off amlodipine. Only takes benazepril 5 mg twice a day An as-needed basis. Most of the time his blood pressure is too low to take the medication.\par \par He is now taking enalapril 2.5 mg twice a day.  He takes the first dose around lunchtime the second dose at bedtime.  He finds that he gets up in the morning his blood pressure is extremely low and does not go up until about noontime.  Otherwise he has been doing well.\par \par

## 2021-09-18 ENCOUNTER — RX RENEWAL (OUTPATIENT)
Age: 76
End: 2021-09-18

## 2021-10-01 ENCOUNTER — RX RENEWAL (OUTPATIENT)
Age: 76
End: 2021-10-01

## 2021-10-15 LAB — INR PPP: 2.4

## 2021-11-09 LAB — INR PPP: 2.4

## 2021-11-11 ENCOUNTER — NON-APPOINTMENT (OUTPATIENT)
Age: 76
End: 2021-11-11

## 2021-11-11 LAB — INR PPP: 5.8

## 2021-11-11 RX ORDER — PHYTONADIONE 5 MG/1
5 TABLET ORAL DAILY
Qty: 7 | Refills: 0 | Status: ACTIVE | COMMUNITY
Start: 2021-11-11

## 2021-11-16 LAB
INR PPP: 2.2
INR PPP: 2.8

## 2021-11-21 ENCOUNTER — RX RENEWAL (OUTPATIENT)
Age: 76
End: 2021-11-21

## 2021-12-08 ENCOUNTER — LABORATORY RESULT (OUTPATIENT)
Age: 76
End: 2021-12-08

## 2021-12-09 LAB
25(OH)D3 SERPL-MCNC: 38.2 NG/ML
ALBUMIN SERPL ELPH-MCNC: 4.2 G/DL
ALP BLD-CCNC: 94 U/L
ALT SERPL-CCNC: 25 U/L
ANION GAP SERPL CALC-SCNC: 12 MMOL/L
AST SERPL-CCNC: 25 U/L
BASOPHILS # BLD AUTO: 0.02 K/UL
BASOPHILS NFR BLD AUTO: 0.3 %
BILIRUB SERPL-MCNC: 0.6 MG/DL
BUN SERPL-MCNC: 12 MG/DL
CALCIUM SERPL-MCNC: 9.5 MG/DL
CHLORIDE SERPL-SCNC: 101 MMOL/L
CHOLEST SERPL-MCNC: 141 MG/DL
CO2 SERPL-SCNC: 27 MMOL/L
CREAT SERPL-MCNC: 0.83 MG/DL
EOSINOPHIL # BLD AUTO: 0.11 K/UL
EOSINOPHIL NFR BLD AUTO: 1.8 %
FOLATE SERPL-MCNC: 3.2 NG/ML
GLUCOSE SERPL-MCNC: 140 MG/DL
HCT VFR BLD CALC: 45.4 %
HCV AB SER QL: NONREACTIVE
HCV S/CO RATIO: 0.11 S/CO
HDLC SERPL-MCNC: 75 MG/DL
HGB BLD-MCNC: 14.6 G/DL
IMM GRANULOCYTES NFR BLD AUTO: 0.3 %
LDLC SERPL CALC-MCNC: 53 MG/DL
LYMPHOCYTES # BLD AUTO: 0.98 K/UL
LYMPHOCYTES NFR BLD AUTO: 16.1 %
MAN DIFF?: NORMAL
MCHC RBC-ENTMCNC: 29.3 PG
MCHC RBC-ENTMCNC: 32.2 GM/DL
MCV RBC AUTO: 91.2 FL
MONOCYTES # BLD AUTO: 0.4 K/UL
MONOCYTES NFR BLD AUTO: 6.6 %
NEUTROPHILS # BLD AUTO: 4.54 K/UL
NEUTROPHILS NFR BLD AUTO: 74.9 %
NONHDLC SERPL-MCNC: 66 MG/DL
PLATELET # BLD AUTO: 92 K/UL
POTASSIUM SERPL-SCNC: 4.4 MMOL/L
PROT SERPL-MCNC: 6.7 G/DL
PSA SERPL-MCNC: 2.36 NG/ML
RBC # BLD: 4.98 M/UL
RBC # FLD: 13.1 %
SODIUM SERPL-SCNC: 141 MMOL/L
TRIGL SERPL-MCNC: 62 MG/DL
TSH SERPL-ACNC: 1.78 UIU/ML
URATE SERPL-MCNC: 2.6 MG/DL
VIT B12 SERPL-MCNC: 426 PG/ML
WBC # FLD AUTO: 6.07 K/UL

## 2021-12-10 ENCOUNTER — APPOINTMENT (OUTPATIENT)
Dept: INTERNAL MEDICINE | Facility: CLINIC | Age: 76
End: 2021-12-10
Payer: MEDICARE

## 2021-12-10 VITALS
DIASTOLIC BLOOD PRESSURE: 80 MMHG | WEIGHT: 150 LBS | RESPIRATION RATE: 14 BRPM | BODY MASS INDEX: 22.73 KG/M2 | TEMPERATURE: 97.2 F | SYSTOLIC BLOOD PRESSURE: 140 MMHG | OXYGEN SATURATION: 98 % | HEART RATE: 67 BPM | HEIGHT: 68 IN

## 2021-12-10 PROCEDURE — 99214 OFFICE O/P EST MOD 30 MIN: CPT

## 2021-12-10 NOTE — HISTORY OF PRESENT ILLNESS
[FreeTextEntry1] : DM [de-identified] : Pt is here for DM and lab results. \par \par Had covid vaccine x 3. Had flu shot. \par \par He had taken too many warfarin and inr was high, then too low when he took vit K and now back to therapeutic. \par \par He moved recently to [place].

## 2021-12-10 NOTE — CONSULT NOTE ADULT - ATTENDING COMMENTS
Addended by: SULEMA JEONG on: 12/10/2021 11:08 AM     Modules accepted: Orders    
The patient was personally seen and examined, in addition to being examined and evaluated by housestaff.  All elements of the note were edited where appropriate.

## 2021-12-14 ENCOUNTER — RX RENEWAL (OUTPATIENT)
Age: 76
End: 2021-12-14

## 2022-01-06 LAB — INR PPP: 2.3

## 2022-01-12 ENCOUNTER — APPOINTMENT (OUTPATIENT)
Dept: NEUROLOGY | Facility: CLINIC | Age: 77
End: 2022-01-12

## 2022-01-12 ENCOUNTER — APPOINTMENT (OUTPATIENT)
Dept: NEUROLOGY | Facility: CLINIC | Age: 77
End: 2022-01-12
Payer: MEDICARE

## 2022-01-12 PROCEDURE — 99215 OFFICE O/P EST HI 40 MIN: CPT | Mod: 95

## 2022-01-12 NOTE — PHYSICAL EXAM
[Person] : oriented to person [Place] : oriented to place [Time] : oriented to time [Registration Intact] : recent registration memory intact [Naming Objects] : no difficulty naming common objects [Fluency] : fluency intact [Cranial Nerves Optic (II)] : visual acuity intact bilaterally,  visual fields full to confrontation, pupils equal round and reactive to light [Motor Handedness Right-Handed] : the patient is right hand dominant [Short Term Intact] : short term memory impaired [FreeTextEntry1] : Facial expression was moderately reduced with mouth open.  His voice was mildly reduced.  Extraocular movements were intact with no obvious square wave jerks.\par Rapid alternating movements were mildly slowed, worse on the left.  Foot tapping was mildly slowed on the left as well.  He was able to get up from the chair without using his arms.  Posture was mildly stooped.  Gait was slow with shortened stride and decreased arm swing.  He had no rest tremor while sitting but did have a reemerging rest tremor on the left when walking.  He had no action tremor.\par \par I could not evaluate tone, strength, sensation, or balance over video\par \par

## 2022-01-12 NOTE — DISCUSSION/SUMMARY
[FreeTextEntry1] : In summary, the patient is a 76-year-old right-handed man with a history of Parkinson's disease since 2010.  Examination was significant for left greater than right slowing as well as a left-sided tremor when walking.  Overall, the history and examination remains consistent with his diagnosis of Parkinson's disease.  There were no findings on examination that would suggest an atypical parkinsonian disorder.\par He is currently taking a combination of rasagiline Mirapex and to Rytary.  While this is helping, he does feel that his activities are limited.  As such I did recommend a cautious and gradual increase of Rytary, starting with 3 in the morning followed by 2 tablets at 1 and 2 tablets at 8.  If this is successful and does not affect his blood pressure too much she can then increase gradually to 3 capsules 3 times a day.  I advised him to continue the trospium at this point.  Further adjustments can be made depending on how he does with the increase.\par As for his REM sleep behavior disorder, for now we will monitor since we are changing his levodopa.  He also would benefit from a swallow evaluation which we can do in a few weeks once the pandemic has slowed down a little bit.\par \par I spent approximately 60 minutes with the patient and his wife, examining and discussing the above findings and impression.  Follow-up will be in 3 to 4 months, sooner if new problems arise

## 2022-01-12 NOTE — HISTORY OF PRESENT ILLNESS
[Home] : at home, [unfilled] , at the time of the visit. [Medical Office: ([facility])___] : at the medical office located in  [Verbal consent obtained from patient] : the patient, [unfilled] [FreeTextEntry1] : The patient is a 76-year-old right-handed man who was diagnosed with Parkinson's disease in 2010 when he developed tremor of the left hand.  He actually was followed here at that time, then switched doctors and for the past few years has been followed in [place].  He now returns for geographic convenience.\par \par His current medications include rasagiline 1 mg daily, Mirapex ER 0.75 mg 3 times a day, and Rytary 195, 2 capsules 3 times a day, though sometimes he takes 3 in the morning.  This was a switch from Sinemet about 1 month ago.  He used to take the Sinemet 3 times a day but had more problems with blood pressure fluctuations, which have improved since the switch.\par \par His voice is a little lower, and he has some drooling and dysphagia.  He does have trouble turning over in bed.  His handwriting is getting worse and he is slow in activities of daily living.  He does not need help at this time.  His walking is slow without freezing of gait.  He has not fallen.  He does have tremors in the left more than the right hand.  He has no dyskinesias or fluctuations.  He has been tempted to young but has not displayed any actual compulsive behaviors.\par \par His memory is getting worse, and he has mild anxiety, but no depression.  He has no daytime hallucinations.  He does kick in his sleep, and does seem to have very vivid dreams and hypnopompic hallucinations.  He does have constipation which he treats with stool softeners.  He has urinary issues for which he has been given trospium.  He has no orthostasis but did have issues with low blood pressure in general.  There is no family history of Parkinson disease or other movement disorders.\par

## 2022-01-26 LAB — INR PPP: 2.7

## 2022-02-01 ENCOUNTER — RX RENEWAL (OUTPATIENT)
Age: 77
End: 2022-02-01

## 2022-02-09 LAB — INR PPP: 2.5

## 2022-02-14 LAB — INR PPP: 2.6

## 2022-03-02 NOTE — CONSULT NOTE ADULT - I WAS PHYSICALLY PRESENT FOR THE KEY PORTIONS OF THE EVALUATION AND MANAGEMENT (E/M) SERVICE PROVIDED.  I AGREE WITH THE ABOVE HISTORY, PHYSICAL, AND PLAN WHICH I HAVE REVIEWED AND EDITED WHERE APPROPRIATE
Patient sitting on side of bed sipping protein drink and tolerating well. Vitals:   Blood pressure (!) 141/71, pulse 87, temperature 98.2 °F (36.8 °C), resp. rate 17, height 5' 4\" (1.626 m), weight 108 kg (238 lb), SpO2 98 %. Output: reviewed, and patient verified urinating clear, yellow urine. Pulmonary: Clear in all lobes  Cardiac: Regular rate and rhythm  Abdomen: Bowel sounds hypo-active x4. Lap sites without erythema, swelling,  and/or drainage. ORAL drain with a small amount of serosanguinous drainage. SCD's: Positioned and operating WNL    Patient with expected pain, but is being managed and is currently tolerable. No nausea and/or vomiting. Patient has been ambulating the halls. Patient has not had the opportunity to swallow pills. Post-op diet progression discussed with patient. Patient to be discharged on a bariatric full liquid diet. Patient verbalized understanding of liquid diet for next two weeks until first post-op visit. Goal of four ounces per hour with one ounce being protein was clearly understood. Medications were discussed (i.e., pain- Dilaudid, Tylenol, not to use aspirin or ibuprofen based products, as well as steroids). Constipation was discussed and ways to alleviate it were discussed. Education completed on IS use and to ambulate every hour when at home. Patient completed a return demonstration on IS with no issues or concerns from this RN. Lovenox and Dilaudid filled and in the home. Lovenox and Dilaudid education provided, and patient verbalized understanding. Patient has chewable multi-vitamin, probiotic, and Prilosec in the home; they were reviewed. Ketosis was also reviewed. Patient given a report card to record intake and a handout to support bedside education. All questions were answered by this RN, and patient verbalized understanding to all education provided. Goals for discharge were discussed, and patient verbalized understanding.   Post-op follow-up [facility] scheduled. Statement Selected

## 2022-03-09 LAB — INR PPP: 2.4

## 2022-03-21 LAB — INR PPP: 3.5

## 2022-04-05 ENCOUNTER — APPOINTMENT (OUTPATIENT)
Dept: CARDIOLOGY | Facility: CLINIC | Age: 77
End: 2022-04-05
Payer: MEDICARE

## 2022-04-05 ENCOUNTER — NON-APPOINTMENT (OUTPATIENT)
Age: 77
End: 2022-04-05

## 2022-04-05 VITALS
DIASTOLIC BLOOD PRESSURE: 81 MMHG | HEART RATE: 80 BPM | WEIGHT: 158 LBS | HEIGHT: 68 IN | BODY MASS INDEX: 23.95 KG/M2 | SYSTOLIC BLOOD PRESSURE: 178 MMHG | OXYGEN SATURATION: 100 %

## 2022-04-05 DIAGNOSIS — I65.29 OCCLUSION AND STENOSIS OF UNSPECIFIED CAROTID ARTERY: ICD-10-CM

## 2022-04-05 PROCEDURE — 93000 ELECTROCARDIOGRAM COMPLETE: CPT

## 2022-04-05 PROCEDURE — 99214 OFFICE O/P EST MOD 30 MIN: CPT

## 2022-04-05 NOTE — DISCUSSION/SUMMARY
[FreeTextEntry1] : Clinically the patient is doing well.  He exercise and has no chest pain, shortness breath, or palpitation.  Blood pressure is well controlled.  I will get blood work from Dr. Weiner.\par \par Went over his medication and I answered all of his questions.  He will schedule a follow-up echocardiogram.  If all is well we will see him in 6 months.

## 2022-04-05 NOTE — HISTORY OF PRESENT ILLNESS
[FreeTextEntry1] : I saw Roxy Lyons in the office today for a routine cardiac followup. He is a 77-year-old  man who is status post aortic valve replacement in 2004 with a mechanical valve for bicuspid aortic stenosis. At that time he had a bypass to the circumflex artery. The other arteries showed nonobstructive disease. He also is being treated for hyperlipidemia, and hypertension.\par \par His Parkinson's disease limits his physical activity.  Otherwise he has no chest pain, lightheadedness, palpitations.   He had an echocardiogram performed 8/20 that demonstrated ejection fraction of 55-60%. Peak aortic gradient increased from 36 stage I diastolic dysfunction.  He had a stress test in 1/19 that showed no ischemia to a workload of 12.8 METs. He had carotid Doppler 5/17  that showed mild plaque without change compared to 2014.\par \par Blood work 12/21 demonstrated a cholesterol of 41, triglycerides 62, HDL 75, and LDL 53..\par \par Patient monitors his blood pressure at home has been getting wide variations in the readings.  He sometimes gets high numbers and sometimes gets very low numbers.  This morning it was only 96 systolic.  He brought in his machine to check for accuracy.  On his machine it was 162/72 and on my machine 160/76..\par \par Over the past 6 months the patient has noted a slight discomfort in his back area by the neck when he exercises. After about 4 minutes on the treadmill it seems to go away. There is no anterior chest pain and no shortness of breath. He otherwise has no other new symptoms.\par \par The patient has been having symptomatic hypotension with bradycardia. Tapered and stopped his metoprolol and now is off amlodipine. \par \par He is now taking enalapril 5 mg 3 times a day.  Blood Pressure at home has been good.  He is still exercising and has no chest pain or shortness of breath.\par \par ECG demonstrates sinus rhythm with a right bundle branch block.\par \par

## 2022-04-06 LAB — INR PPP: 2.9

## 2022-04-25 LAB — INR PPP: 2.7

## 2022-05-10 ENCOUNTER — NON-APPOINTMENT (OUTPATIENT)
Age: 77
End: 2022-05-10

## 2022-05-11 LAB — INR PPP: 2.5

## 2022-05-16 LAB — INR PPP: 2.7

## 2022-06-03 ENCOUNTER — LABORATORY RESULT (OUTPATIENT)
Age: 77
End: 2022-06-03

## 2022-06-09 ENCOUNTER — NON-APPOINTMENT (OUTPATIENT)
Age: 77
End: 2022-06-09

## 2022-06-10 LAB — INR PPP: 2.5

## 2022-06-17 LAB — INR PPP: 2.8

## 2022-06-23 ENCOUNTER — RX RENEWAL (OUTPATIENT)
Age: 77
End: 2022-06-23

## 2022-06-29 ENCOUNTER — APPOINTMENT (OUTPATIENT)
Dept: INTERNAL MEDICINE | Facility: CLINIC | Age: 77
End: 2022-06-29

## 2022-07-07 LAB — INR PPP: 3

## 2022-07-08 ENCOUNTER — APPOINTMENT (OUTPATIENT)
Dept: NEUROLOGY | Facility: CLINIC | Age: 77
End: 2022-07-08

## 2022-07-08 VITALS — DIASTOLIC BLOOD PRESSURE: 89 MMHG | SYSTOLIC BLOOD PRESSURE: 184 MMHG | HEART RATE: 67 BPM

## 2022-07-08 PROCEDURE — 99214 OFFICE O/P EST MOD 30 MIN: CPT

## 2022-07-08 RX ORDER — TROSPIUM CHLORIDE 20 MG/1
20 TABLET, FILM COATED ORAL
Refills: 0 | Status: DISCONTINUED | COMMUNITY
End: 2022-07-08

## 2022-07-08 RX ORDER — TROSPIUM CHLORIDE 20 MG/1
20 TABLET, FILM COATED ORAL TWICE DAILY
Qty: 60 | Refills: 1 | Status: DISCONTINUED | COMMUNITY
End: 2022-07-08

## 2022-07-08 NOTE — DISCUSSION/SUMMARY
[FreeTextEntry1] : In summary, the patient is a 77-year-old right-handed man with a history of Parkinson's disease since 2010.  Examination was significant for left greater than right slowing as well as a left-sided tremor when walking.  Overall, the history and examination remains consistent with his diagnosis of Parkinson's disease.  There were no findings on examination that would suggest an atypical parkinsonian disorder.\par \par 1. He is currently taking a combination of rasagiline, Mirapex, and to Rytary. Given his BP issues I would recommend a trial of eliminating azilect and maybe lowering mirapex (one at a time), to see if he still needs those\par 2. PT\par 3. As for his REM sleep behavior disorder, for now we will monitor since we are changing his levodopa.  He also would benefit from a swallow evaluation which we can do in a few weeks once the pandemic has slowed down a little bit.\par \par We discussed the above impression, plan and recommendations during the visit. Counseling represented more then 50% of the 30 minute visit time\par

## 2022-07-08 NOTE — PHYSICAL EXAM
[Person] : oriented to person [Place] : oriented to place [Time] : oriented to time [Short Term Intact] : short term memory impaired [Registration Intact] : recent registration memory intact [Naming Objects] : no difficulty naming common objects [Fluency] : fluency intact [Cranial Nerves Optic (II)] : visual acuity intact bilaterally,  visual fields full to confrontation, pupils equal round and reactive to light [Motor Handedness Right-Handed] : the patient is right hand dominant [FreeTextEntry1] : Facial expression was moderately reduced with mouth open.  His voice was mildly reduced.  Extraocular movements were intact with no obvious square wave jerks.\par \par Tone mildly increased in left>right arm. \par \par Rapid alternating movements were mildly slowed, worse on the left.  Foot tapping was mildly slowed on the \par left as well.  He was able to get up from the chair without using his arms.  Posture was mildly stooped.  Gait was slow with shortened stride and decreased arm swing.  He had no rest tremor while sitting but did have a reemerging rest tremor on the left when walking.  He had no action tremor.\par \par Pull test negative\par \par

## 2022-07-08 NOTE — HISTORY OF PRESENT ILLNESS
[FreeTextEntry1] : The patient is a 77-year-old right-handed man who was diagnosed with Parkinson's disease in 2010 when he developed tremor of the left hand.  He actually was followed here at that time, then switched doctors and for the past few years has been followed in [place].  He now returns for geographic convenience.\par \par 1. His current medications include rasagiline 1 mg daily, Mirapex ER 0.75 mg 3 times a day, and Rytary 195, 2 capsules 3 times a day, though sometimes he takes 3 in the morning.  This was a switch from Sinemet about 1 month ago.  He used to take the Sinemet 3 times a day but had more problems with blood pressure fluctuations, which have improved since the switch, though worse again now. Fluctuates between low and very high. Is off antihypertensives (except enalapril) due to low BP\par 2. His voice is a little lower, and he has some drooling and dysphagia for pills.  He does have trouble turning over in bed.  His handwriting is getting worse and he is slow in activities of daily living.  He does not need help at this time.  His walking is slow without freezing of gait.  He has not fallen.  He does have tremors in the left more than the right hand.  He has no dyskinesias or fluctuations.  \par 3. He has been tempted to young but has not displayed any actual compulsive behaviors.\par 4. His memory is getting worse, and he has mild anxiety, but no depression.  He has no daytime hallucinations.  He does kick in his sleep, and does seem to have very vivid dreams and hypnopompic hallucinations.  He does have constipation which he treats with stool softeners.  He has urinary issues for which he has been given myrbetriq. He has no orthostasis but did have issues with low blood pressure in general.

## 2022-07-11 ENCOUNTER — NON-APPOINTMENT (OUTPATIENT)
Age: 77
End: 2022-07-11

## 2022-07-11 ENCOUNTER — APPOINTMENT (OUTPATIENT)
Dept: CARDIOLOGY | Facility: CLINIC | Age: 77
End: 2022-07-11

## 2022-07-11 VITALS
OXYGEN SATURATION: 97 % | WEIGHT: 153 LBS | HEIGHT: 68 IN | BODY MASS INDEX: 23.19 KG/M2 | SYSTOLIC BLOOD PRESSURE: 158 MMHG | DIASTOLIC BLOOD PRESSURE: 85 MMHG | HEART RATE: 70 BPM

## 2022-07-11 PROCEDURE — 99214 OFFICE O/P EST MOD 30 MIN: CPT

## 2022-07-11 PROCEDURE — 93000 ELECTROCARDIOGRAM COMPLETE: CPT

## 2022-07-11 RX ORDER — ENALAPRIL MALEATE 2.5 MG/1
2.5 TABLET ORAL TWICE DAILY
Qty: 60 | Refills: 3 | Status: DISCONTINUED | COMMUNITY
End: 2022-07-11

## 2022-07-20 ENCOUNTER — APPOINTMENT (OUTPATIENT)
Dept: CARDIOLOGY | Facility: CLINIC | Age: 77
End: 2022-07-20

## 2022-07-20 PROCEDURE — 93306 TTE W/DOPPLER COMPLETE: CPT

## 2022-07-26 LAB — INR PPP: 2.4

## 2022-08-08 ENCOUNTER — NON-APPOINTMENT (OUTPATIENT)
Age: 77
End: 2022-08-08

## 2022-08-09 LAB — INR PPP: 2.9

## 2022-08-18 LAB
ALBUMIN SERPL ELPH-MCNC: 4.2 G/DL
ALP BLD-CCNC: 83 U/L
ALT SERPL-CCNC: 20 U/L
ANION GAP SERPL CALC-SCNC: 11 MMOL/L
AST SERPL-CCNC: 27 U/L
BASOPHILS # BLD AUTO: 0.03 K/UL
BASOPHILS NFR BLD AUTO: 0.6 %
BILIRUB SERPL-MCNC: 0.8 MG/DL
BUN SERPL-MCNC: 12 MG/DL
CALCIUM SERPL-MCNC: 9.5 MG/DL
CHLORIDE SERPL-SCNC: 102 MMOL/L
CHOLEST SERPL-MCNC: 134 MG/DL
CO2 SERPL-SCNC: 26 MMOL/L
CREAT SERPL-MCNC: 0.8 MG/DL
CREAT SPEC-SCNC: 68 MG/DL
EGFR: 91 ML/MIN/1.73M2
EOSINOPHIL # BLD AUTO: 0.28 K/UL
EOSINOPHIL NFR BLD AUTO: 5.6 %
ESTIMATED AVERAGE GLUCOSE: 166 MG/DL
FOLATE SERPL-MCNC: 4.2 NG/ML
GLUCOSE SERPL-MCNC: 105 MG/DL
HBA1C MFR BLD HPLC: 7.4 %
HCT VFR BLD CALC: 44.8 %
HDLC SERPL-MCNC: 72 MG/DL
HGB BLD-MCNC: 14.4 G/DL
IMM GRANULOCYTES NFR BLD AUTO: 0.4 %
LDLC SERPL CALC-MCNC: 50 MG/DL
LYMPHOCYTES # BLD AUTO: 1.74 K/UL
LYMPHOCYTES NFR BLD AUTO: 35.1 %
MAN DIFF?: NORMAL
MCHC RBC-ENTMCNC: 29.4 PG
MCHC RBC-ENTMCNC: 32.1 GM/DL
MCV RBC AUTO: 91.4 FL
MICROALBUMIN 24H UR DL<=1MG/L-MCNC: <1.2 MG/DL
MICROALBUMIN/CREAT 24H UR-RTO: NORMAL MG/G
MONOCYTES # BLD AUTO: 0.36 K/UL
MONOCYTES NFR BLD AUTO: 7.3 %
NEUTROPHILS # BLD AUTO: 2.53 K/UL
NEUTROPHILS NFR BLD AUTO: 51 %
NONHDLC SERPL-MCNC: 62 MG/DL
PLATELET # BLD AUTO: 113 K/UL
POTASSIUM SERPL-SCNC: 4.7 MMOL/L
PROT SERPL-MCNC: 6.2 G/DL
PSA SERPL-MCNC: 2.78 NG/ML
RBC # BLD: 4.9 M/UL
RBC # FLD: 13.3 %
SODIUM SERPL-SCNC: 139 MMOL/L
TRIGL SERPL-MCNC: 61 MG/DL
VIT B12 SERPL-MCNC: 360 PG/ML
WBC # FLD AUTO: 4.96 K/UL

## 2022-08-19 ENCOUNTER — APPOINTMENT (OUTPATIENT)
Dept: INTERNAL MEDICINE | Facility: CLINIC | Age: 77
End: 2022-08-19

## 2022-08-19 VITALS
BODY MASS INDEX: 22.73 KG/M2 | HEART RATE: 77 BPM | TEMPERATURE: 98 F | RESPIRATION RATE: 14 BRPM | WEIGHT: 150 LBS | DIASTOLIC BLOOD PRESSURE: 70 MMHG | SYSTOLIC BLOOD PRESSURE: 130 MMHG | OXYGEN SATURATION: 98 % | HEIGHT: 68 IN

## 2022-08-19 PROCEDURE — 99214 OFFICE O/P EST MOD 30 MIN: CPT

## 2022-08-19 NOTE — HISTORY OF PRESENT ILLNESS
[Spouse] : spouse [FreeTextEntry1] : lab results\par \par  [de-identified] : Pt has h/o Parkinson's disease. \par Has HTN and monitors at home which fluctuates at times. He adjusts his enalapril according to BP. \par He stopped azilect almost 2 months ago to see if that helps with BP control. It seems to be improved with is BP. Has not noticed a change in Parkinson's symptoms. \par He sees Dr Weiner for endocrinology and is interested in the insulin pump. He would like another appt. \par c/o 6 months left lower back pain on and off. Wakes up with it and feels better with walking around or stretching.

## 2022-08-19 NOTE — PHYSICAL EXAM
[No Edema] : there was no peripheral edema [Normal] : affect was normal and insight and judgment were intact [de-identified] : points to left lower back where pain is [de-identified] : hand tremor

## 2022-08-19 NOTE — HEALTH RISK ASSESSMENT
[Never] : Never [0] : 2) Feeling down, depressed, or hopeless: Not at all (0) [PHQ-2 Negative - No further assessment needed] : PHQ-2 Negative - No further assessment needed [[id number]] : 0

## 2022-09-01 LAB — INR PPP: 3.3

## 2022-09-20 ENCOUNTER — NON-APPOINTMENT (OUTPATIENT)
Age: 77
End: 2022-09-20

## 2022-10-05 ENCOUNTER — NON-APPOINTMENT (OUTPATIENT)
Age: 77
End: 2022-10-05

## 2022-10-05 LAB — INR PPP: 2.3

## 2022-10-06 LAB — INR PPP: 3.5

## 2022-10-12 NOTE — ED PROVIDER NOTE - NS ED MD DISPO DIVISION
Problem: Skin/Tissue Integrity  Goal: Absence of new skin breakdown  Description: 1. Monitor for areas of redness and/or skin breakdown  2. Assess vascular access sites hourly  3. Every 4-6 hours minimum:  Change oxygen saturation probe site  4. Every 4-6 hours:  If on nasal continuous positive airway pressure, respiratory therapy assess nares and determine need for appliance change or resting period. Outcome: Progressing   Polysporin applied to affected area on right posterior head. No s/sx of infection noted thus far. Problem: Pain  Goal: Verbalizes/displays adequate comfort level or baseline comfort level  Outcome: Progressing   Pt. Continues to voice head pain, PRN Fioricet administered. Per pt. Headache is relieved with Fioricet. Pain level will be decreased or be at ab acceptable by discharge. Problem: ABCDS Injury Assessment  Goal: Absence of physical injury  Outcome: Progressing   Pt. Ambulates X1 CGA with walker. Call light and over bed table within reach, fall sign posted at the door, video surveillance in place, bed wheels locked and in lowest position. 1:1 and education on the use of the call light and when to call for assistance. Pt. Will be free from fall injury by discharge. [facility]

## 2022-10-14 ENCOUNTER — APPOINTMENT (OUTPATIENT)
Dept: CARDIOLOGY | Facility: CLINIC | Age: 77
End: 2022-10-14

## 2022-10-24 LAB — INR PPP: 2.8

## 2022-11-04 ENCOUNTER — APPOINTMENT (OUTPATIENT)
Dept: ENDOCRINOLOGY | Facility: CLINIC | Age: 77
End: 2022-11-04

## 2022-11-07 ENCOUNTER — NON-APPOINTMENT (OUTPATIENT)
Age: 77
End: 2022-11-07

## 2022-11-07 ENCOUNTER — APPOINTMENT (OUTPATIENT)
Dept: CARDIOLOGY | Facility: CLINIC | Age: 77
End: 2022-11-07

## 2022-11-07 VITALS
BODY MASS INDEX: 23.19 KG/M2 | HEIGHT: 68 IN | HEART RATE: 68 BPM | SYSTOLIC BLOOD PRESSURE: 205 MMHG | WEIGHT: 153 LBS | DIASTOLIC BLOOD PRESSURE: 83 MMHG | OXYGEN SATURATION: 100 %

## 2022-11-07 DIAGNOSIS — Z95.4 PRESENCE OF OTHER HEART-VALVE REPLACEMENT: ICD-10-CM

## 2022-11-07 PROCEDURE — 93000 ELECTROCARDIOGRAM COMPLETE: CPT

## 2022-11-07 PROCEDURE — 99215 OFFICE O/P EST HI 40 MIN: CPT

## 2022-11-14 LAB — INR PPP: 2.5

## 2022-11-21 ENCOUNTER — APPOINTMENT (OUTPATIENT)
Dept: UROLOGY | Facility: CLINIC | Age: 77
End: 2022-11-21

## 2022-11-21 PROCEDURE — 99204 OFFICE O/P NEW MOD 45 MIN: CPT

## 2022-11-21 NOTE — PHYSICAL EXAM
[General Appearance - Well Developed] : well developed [General Appearance - Well Nourished] : well nourished [Normal Appearance] : normal appearance [Well Groomed] : well groomed [General Appearance - In No Acute Distress] : no acute distress [Edema] : no peripheral edema [Abdomen Soft] : soft [Abdomen Tenderness] : non-tender [Abdomen Mass (___ Cm)] : no abdominal mass palpated [Abdomen Hernia] : no hernia was discovered [Costovertebral Angle Tenderness] : no ~M costovertebral angle tenderness [FreeTextEntry1] : tremor [] : no rash [No Focal Deficits] : no focal deficits [Oriented To Time, Place, And Person] : oriented to person, place, and time [Affect] : the affect was normal [Mood] : the mood was normal [Not Anxious] : not anxious [No Palpable Adenopathy] : no palpable adenopathy

## 2022-11-21 NOTE — HISTORY OF PRESENT ILLNESS
[FreeTextEntry1] : patient with hx of DM for 30+ years and Parkinsons for 12 years\par [place]\par c/o luts of urgency and frequency in day \par no noturai\par was placed on myrbetriq byt that doctor\par [place] and was askig for increased dose as still with frquny and urgency \par but told to see \par denies heamturia or dysuria or utis \par bowel habits ok , not sexyally active \par hx of CT ( 2019): kidneys OK , aug 2022; creat 0.8\par here for further eval:

## 2022-11-21 NOTE — REVIEW OF SYSTEMS
[Dry Eyes] : dryness of the eyes [see HPI] : see HPI [Difficulty Walking] : difficulty walking [Negative] : Heme/Lymph

## 2022-11-21 NOTE — ASSESSMENT
[FreeTextEntry1] : patient with hx of DM for 30+ years and Parkinsons for 12 years\par was [place] parkinsons doctor [place]  [place]\par c/o luts of urgency and frequency in day \par no noturai\par was placed on myrbetriq byt that doctor\par [place] and was askig for increased dose as still with frquny and urgency \par but told to see \par denies heamturia or dysuria or utis \par bowel habits ok , not sexyally active \par hx of CT ( 2019): kidneys OK , aug 2022; creat 0.8\par here for further eval:\par 1- discussed that bladder dysfunction may be reated to DM , Parkinsons or combo \par 2 - check urine\par 3- recommend UDS ( brochure given) to better eval before continuing meds

## 2022-12-06 ENCOUNTER — NON-APPOINTMENT (OUTPATIENT)
Age: 77
End: 2022-12-06

## 2022-12-07 LAB — INR PPP: 2.5

## 2022-12-15 ENCOUNTER — APPOINTMENT (OUTPATIENT)
Dept: ENDOCRINOLOGY | Facility: CLINIC | Age: 77
End: 2022-12-15

## 2022-12-28 LAB — INR PPP: 3.1

## 2023-01-06 ENCOUNTER — APPOINTMENT (OUTPATIENT)
Dept: NEUROLOGY | Facility: CLINIC | Age: 78
End: 2023-01-06
Payer: MEDICARE

## 2023-01-06 VITALS — HEART RATE: 71 BPM | DIASTOLIC BLOOD PRESSURE: 94 MMHG | SYSTOLIC BLOOD PRESSURE: 204 MMHG

## 2023-01-06 PROCEDURE — 99214 OFFICE O/P EST MOD 30 MIN: CPT

## 2023-01-06 NOTE — PHYSICAL EXAM
[Person] : oriented to person [Place] : oriented to place [Time] : oriented to time [Registration Intact] : recent registration memory intact [Naming Objects] : no difficulty naming common objects [Fluency] : fluency intact [Cranial Nerves Optic (II)] : visual acuity intact bilaterally,  visual fields full to confrontation, pupils equal round and reactive to light [Motor Handedness Right-Handed] : the patient is right hand dominant [Motor Strength] : muscle strength was normal in all four extremities [Short Term Intact] : short term memory impaired [FreeTextEntry4] : 2/3 delayed recall [FreeTextEntry1] : Seen 4 hours after dose\par \par Facial expression was moderately reduced with mouth open.  His voice was mildly reduced.  Extraocular movements were intact with no obvious square wave jerks.\par \par Tone increased in left>right arm. \par \par Rapid alternating movements were mildly slowed, worse on the left.  Foot tapping was mildly slowed on the \par left as well.  He was able to get up from the chair without using his arms.  Posture was mildly stooped.  Gait was slow with shortened stride and decreased arm swing.  He had no rest tremor while sitting but did have a reemerging rest tremor on the left when walking.  He had no action tremor.\par \par Pull test negative\par \par

## 2023-01-06 NOTE — DISCUSSION/SUMMARY
[FreeTextEntry1] : In summary, the patient is a 77-year-old right-handed man with a history of Parkinson's disease since 2010.  Examination was significant for left greater than right slowing as well as a left-sided tremor when walking.  Overall, the history and examination remains consistent with his diagnosis of Parkinson's disease.  There were no findings on examination that would suggest an atypical parkinsonian disorder.\par \par 1. He is currently taking a combination of Mirapex, and Rytary. He had tried to reduce mirapex before, felt worse. As he is worse, try to go back up on rytary (2-2-1, maybe 2-2-2)\par 2. PT\par 3. As for his REM sleep behavior disorder, for now we will monitor since we are changing his levodopa.  He also would benefit from a swallow evaluation which we can do in a few weeks once the pandemic has slowed down a little bit.\par 4. Does voice therapy, continue, exercise. \par \par We discussed the above impression, plan and recommendations during the visit. Counseling represented more then 50% of the 30 minute visit time

## 2023-01-06 NOTE — HISTORY OF PRESENT ILLNESS
[FreeTextEntry1] : The patient is a 77-year-old right-handed man who was diagnosed with Parkinson's disease in 2010 when he developed tremor of the left hand.  He actually was followed here at that time, then switched doctors and for the past few years has been followed in [place].  He now returns for geographic convenience.\par \par 1. His current medications include Mirapex ER 0.75 mg 2-1-1 times a day, and Rytary 195, 2-1-1 (lowered, but takes care to take it without food. No clear ICD, but plays a lot of bridge. With lowering of rytary, constipation got better, tremor and balance\par 2. Rytary was a switch from Sinemet about 1 month ago.  He used to take the Sinemet 3 times a day but had more problems with blood pressure fluctuations, which have improved since the switch, though worse again now. Fluctuates between low and very high. Is off antihypertensives (except enalapril) due to low BP\par 3. His voice is a little lower, and he has some drooling and dysphagia for pills.  He does have trouble turning over in bed.  His handwriting is getting worse and he is slow in activities of daily living.  He does not need help with ADLs at this time.  His walking is slow without freezing of gait.  He has not fallen.  He does have tremors in the left more than the right hand.  He has no dyskinesias or fluctuations.  \par 4. His memory is getting worse, and he has mild anxiety, but no depression.  He has no daytime hallucinations.  No movements in his sleep, and does seem to have very vivid dreams and hypnopompic hallucinations.  He does have constipation which he treats with stool softeners.  He has urinary issues for which he has been given myrbetriq. He has no orthostasis but did have issues with low blood pressure in general.

## 2023-01-13 ENCOUNTER — NON-APPOINTMENT (OUTPATIENT)
Age: 78
End: 2023-01-13

## 2023-01-17 LAB — INR PPP: 2.9

## 2023-01-20 NOTE — PROGRESS NOTE ADULT - PROBLEM SELECTOR PLAN 6
chronic, stable  cont iss with hypoglycemic protocol  fsbs 100-180
No

## 2023-01-31 ENCOUNTER — APPOINTMENT (OUTPATIENT)
Dept: UROLOGY | Facility: CLINIC | Age: 78
End: 2023-01-31

## 2023-02-03 ENCOUNTER — OUTPATIENT (OUTPATIENT)
Dept: OUTPATIENT SERVICES | Facility: HOSPITAL | Age: 78
LOS: 1 days | End: 2023-02-03
Payer: MEDICARE

## 2023-02-03 ENCOUNTER — APPOINTMENT (OUTPATIENT)
Dept: UROLOGY | Facility: CLINIC | Age: 78
End: 2023-02-03

## 2023-02-03 VITALS
HEIGHT: 68 IN | SYSTOLIC BLOOD PRESSURE: 194 MMHG | WEIGHT: 153 LBS | TEMPERATURE: 98 F | HEART RATE: 71 BPM | DIASTOLIC BLOOD PRESSURE: 90 MMHG | BODY MASS INDEX: 23.19 KG/M2 | OXYGEN SATURATION: 98 %

## 2023-02-03 VITALS
TEMPERATURE: 98 F | SYSTOLIC BLOOD PRESSURE: 194 MMHG | HEART RATE: 71 BPM | HEIGHT: 68 IN | DIASTOLIC BLOOD PRESSURE: 90 MMHG | BODY MASS INDEX: 23.19 KG/M2 | WEIGHT: 153 LBS

## 2023-02-03 DIAGNOSIS — Z95.2 PRESENCE OF PROSTHETIC HEART VALVE: Chronic | ICD-10-CM

## 2023-02-03 DIAGNOSIS — R35.0 FREQUENCY OF MICTURITION: ICD-10-CM

## 2023-02-03 DIAGNOSIS — Z95.1 PRESENCE OF AORTOCORONARY BYPASS GRAFT: Chronic | ICD-10-CM

## 2023-02-03 PROCEDURE — 51728 CYSTOMETROGRAM W/VP: CPT

## 2023-02-03 PROCEDURE — 51797 INTRAABDOMINAL PRESSURE TEST: CPT

## 2023-02-03 PROCEDURE — 51741 ELECTRO-UROFLOWMETRY FIRST: CPT

## 2023-02-03 PROCEDURE — 51784 ANAL/URINARY MUSCLE STUDY: CPT

## 2023-02-04 LAB
25(OH)D3 SERPL-MCNC: 33 NG/ML
ALBUMIN SERPL ELPH-MCNC: 4.3 G/DL
ALP BLD-CCNC: 75 U/L
ALT SERPL-CCNC: 12 U/L
ANION GAP SERPL CALC-SCNC: 11 MMOL/L
APPEARANCE: CLEAR
AST SERPL-CCNC: 24 U/L
BACTERIA: NEGATIVE
BASOPHILS # BLD AUTO: 0.03 K/UL
BASOPHILS NFR BLD AUTO: 0.5 %
BILIRUB SERPL-MCNC: 0.7 MG/DL
BILIRUBIN URINE: NEGATIVE
BLOOD URINE: NEGATIVE
BUN SERPL-MCNC: 15 MG/DL
CALCIUM SERPL-MCNC: 9.4 MG/DL
CHLORIDE SERPL-SCNC: 104 MMOL/L
CHOLEST SERPL-MCNC: 145 MG/DL
CO2 SERPL-SCNC: 26 MMOL/L
COLOR: YELLOW
CREAT SERPL-MCNC: 0.91 MG/DL
CREAT SPEC-SCNC: 102 MG/DL
EGFR: 86 ML/MIN/1.73M2
EOSINOPHIL # BLD AUTO: 0.25 K/UL
EOSINOPHIL NFR BLD AUTO: 4.3 %
ESTIMATED AVERAGE GLUCOSE: 169 MG/DL
FOLATE SERPL-MCNC: 5.5 NG/ML
GLUCOSE QUALITATIVE U: NORMAL
GLUCOSE SERPL-MCNC: 107 MG/DL
HBA1C MFR BLD HPLC: 7.5 %
HCT VFR BLD CALC: 45.1 %
HDLC SERPL-MCNC: 82 MG/DL
HGB BLD-MCNC: 14.4 G/DL
HYALINE CASTS: 0 /LPF
IMM GRANULOCYTES NFR BLD AUTO: 0.3 %
KETONES URINE: NEGATIVE
LDLC SERPL CALC-MCNC: 53 MG/DL
LEUKOCYTE ESTERASE URINE: NEGATIVE
LYMPHOCYTES # BLD AUTO: 1.41 K/UL
LYMPHOCYTES NFR BLD AUTO: 24.3 %
MAN DIFF?: NORMAL
MCHC RBC-ENTMCNC: 29.7 PG
MCHC RBC-ENTMCNC: 31.9 GM/DL
MCV RBC AUTO: 93 FL
MICROALBUMIN 24H UR DL<=1MG/L-MCNC: <1.2 MG/DL
MICROALBUMIN/CREAT 24H UR-RTO: NORMAL MG/G
MICROSCOPIC-UA: NORMAL
MONOCYTES # BLD AUTO: 0.37 K/UL
MONOCYTES NFR BLD AUTO: 6.4 %
NEUTROPHILS # BLD AUTO: 3.72 K/UL
NEUTROPHILS NFR BLD AUTO: 64.2 %
NITRITE URINE: NEGATIVE
NONHDLC SERPL-MCNC: 63 MG/DL
PH URINE: 6
PLATELET # BLD AUTO: 104 K/UL
POTASSIUM SERPL-SCNC: 4.3 MMOL/L
PROT SERPL-MCNC: 6.5 G/DL
PROTEIN URINE: NEGATIVE
PSA SERPL-MCNC: 3.42 NG/ML
RBC # BLD: 4.85 M/UL
RBC # FLD: 13.1 %
RED BLOOD CELLS URINE: 1 /HPF
SODIUM SERPL-SCNC: 141 MMOL/L
SPECIFIC GRAVITY URINE: 1.02
SQUAMOUS EPITHELIAL CELLS: 1 /HPF
TRIGL SERPL-MCNC: 50 MG/DL
TSH SERPL-ACNC: 2.2 UIU/ML
URATE SERPL-MCNC: 3.7 MG/DL
UROBILINOGEN URINE: NORMAL
VIT B12 SERPL-MCNC: 364 PG/ML
WBC # FLD AUTO: 5.8 K/UL
WHITE BLOOD CELLS URINE: 1 /HPF

## 2023-02-05 ENCOUNTER — NON-APPOINTMENT (OUTPATIENT)
Age: 78
End: 2023-02-05

## 2023-02-07 LAB — INR PPP: 3.5

## 2023-02-08 ENCOUNTER — APPOINTMENT (OUTPATIENT)
Dept: INTERNAL MEDICINE | Facility: CLINIC | Age: 78
End: 2023-02-08
Payer: MEDICARE

## 2023-02-08 VITALS
TEMPERATURE: 97.7 F | HEIGHT: 68 IN | RESPIRATION RATE: 14 BRPM | WEIGHT: 159 LBS | BODY MASS INDEX: 24.1 KG/M2 | DIASTOLIC BLOOD PRESSURE: 90 MMHG | OXYGEN SATURATION: 98 % | SYSTOLIC BLOOD PRESSURE: 150 MMHG | HEART RATE: 72 BPM

## 2023-02-08 DIAGNOSIS — R97.20 ELEVATED PROSTATE, SPECIFIC ANTIGEN [PSA]: ICD-10-CM

## 2023-02-08 DIAGNOSIS — E53.8 DEFICIENCY OF OTHER SPECIFIED B GROUP VITAMINS: ICD-10-CM

## 2023-02-08 DIAGNOSIS — M62.89 OTHER SPECIFIED DISORDERS OF MUSCLE: ICD-10-CM

## 2023-02-08 DIAGNOSIS — D69.6 THROMBOCYTOPENIA, UNSPECIFIED: Chronic | ICD-10-CM

## 2023-02-08 DIAGNOSIS — E78.5 HYPERLIPIDEMIA, UNSPECIFIED: ICD-10-CM

## 2023-02-08 PROCEDURE — G0439: CPT

## 2023-02-08 NOTE — HISTORY OF PRESENT ILLNESS
[de-identified] : Pt has Parkinson's and c/o feeling stiff. \par He has DM and has an insulin pump for the last month. He sees Dr Weiner. \par \par Had covid vaccine x 4, flu shot. \par \par Has mechanical aortic valve since 2004 and on warfarin. Does home inr testing every other week.

## 2023-02-08 NOTE — HEALTH RISK ASSESSMENT
[Yes] : Yes [Monthly or less (1 pt)] : Monthly or less (1 point) [1 or 2 (0 pts)] : 1 or 2 (0 points) [No] : In the past 12 months have you used drugs other than those required for medical reasons? No [0] : 2) Feeling down, depressed, or hopeless: Not at all (0) [PHQ-2 Negative - No further assessment needed] : PHQ-2 Negative - No further assessment needed [[id number]] : 0

## 2023-02-10 ENCOUNTER — APPOINTMENT (OUTPATIENT)
Dept: INTERNAL MEDICINE | Facility: CLINIC | Age: 78
End: 2023-02-10
Payer: MEDICARE

## 2023-02-10 VITALS
OXYGEN SATURATION: 99 % | SYSTOLIC BLOOD PRESSURE: 124 MMHG | RESPIRATION RATE: 16 BRPM | HEART RATE: 78 BPM | WEIGHT: 160 LBS | HEIGHT: 68 IN | BODY MASS INDEX: 24.25 KG/M2 | DIASTOLIC BLOOD PRESSURE: 62 MMHG | TEMPERATURE: 97.9 F

## 2023-02-10 DIAGNOSIS — R50.9 FEVER, UNSPECIFIED: ICD-10-CM

## 2023-02-10 DIAGNOSIS — R05.9 COUGH, UNSPECIFIED: ICD-10-CM

## 2023-02-10 DIAGNOSIS — J02.9 ACUTE PHARYNGITIS, UNSPECIFIED: ICD-10-CM

## 2023-02-10 LAB
FLUAV SPEC QL CULT: NORMAL
FLUBV AG SPEC QL IA: NORMAL
S PYO AG SPEC QL IA: NORMAL

## 2023-02-10 PROCEDURE — 99214 OFFICE O/P EST MOD 30 MIN: CPT | Mod: 25

## 2023-02-10 PROCEDURE — 87804 INFLUENZA ASSAY W/OPTIC: CPT | Mod: 59,QW

## 2023-02-10 PROCEDURE — 87880 STREP A ASSAY W/OPTIC: CPT | Mod: QW

## 2023-02-10 NOTE — HISTORY OF PRESENT ILLNESS
[FreeTextEntry8] : Pt is c/o cough, rhinorrhea, sore throat for 2 days.\par Had [id number] yesterday.  Temp decreased after taking tylenol.\par Had negative covid test this morning

## 2023-02-16 ENCOUNTER — APPOINTMENT (OUTPATIENT)
Dept: INTERNAL MEDICINE | Facility: CLINIC | Age: 78
End: 2023-02-16
Payer: MEDICARE

## 2023-02-16 VITALS
TEMPERATURE: 97.6 F | BODY MASS INDEX: 24.25 KG/M2 | HEART RATE: 72 BPM | RESPIRATION RATE: 18 BRPM | HEIGHT: 68 IN | OXYGEN SATURATION: 97 % | DIASTOLIC BLOOD PRESSURE: 82 MMHG | WEIGHT: 160 LBS | SYSTOLIC BLOOD PRESSURE: 138 MMHG

## 2023-02-16 DIAGNOSIS — J20.8 ACUTE BRONCHITIS DUE TO OTHER SPECIFIED ORGANISMS: ICD-10-CM

## 2023-02-16 PROCEDURE — 99214 OFFICE O/P EST MOD 30 MIN: CPT

## 2023-02-16 RX ORDER — CEFUROXIME AXETIL 500 MG/1
500 TABLET ORAL
Qty: 14 | Refills: 0 | Status: DISCONTINUED | COMMUNITY
Start: 2023-02-10 | End: 2023-02-16

## 2023-02-21 DIAGNOSIS — E11.9 TYPE 2 DIABETES MELLITUS WITHOUT COMPLICATIONS: ICD-10-CM

## 2023-02-21 DIAGNOSIS — G20 PARKINSON'S DISEASE: ICD-10-CM

## 2023-02-21 DIAGNOSIS — N32.0 BLADDER-NECK OBSTRUCTION: ICD-10-CM

## 2023-02-27 LAB — INR PPP: 2.8

## 2023-03-21 ENCOUNTER — NON-APPOINTMENT (OUTPATIENT)
Age: 78
End: 2023-03-21

## 2023-03-22 ENCOUNTER — APPOINTMENT (OUTPATIENT)
Dept: UROLOGY | Facility: CLINIC | Age: 78
End: 2023-03-22
Payer: MEDICARE

## 2023-03-22 VITALS
TEMPERATURE: 98.4 F | OXYGEN SATURATION: 95 % | HEART RATE: 75 BPM | SYSTOLIC BLOOD PRESSURE: 169 MMHG | DIASTOLIC BLOOD PRESSURE: 84 MMHG

## 2023-03-22 DIAGNOSIS — E11.9 TYPE 2 DIABETES MELLITUS W/OUT COMPLICATIONS: ICD-10-CM

## 2023-03-22 PROCEDURE — 99214 OFFICE O/P EST MOD 30 MIN: CPT

## 2023-03-22 NOTE — PHYSICAL EXAM
[General Appearance - Well Developed] : well developed [General Appearance - Well Nourished] : well nourished [Normal Appearance] : normal appearance [Well Groomed] : well groomed [General Appearance - In No Acute Distress] : no acute distress [Abdomen Soft] : soft [Abdomen Tenderness] : non-tender [Costovertebral Angle Tenderness] : no ~M costovertebral angle tenderness [FreeTextEntry1] : pvr- 211ml

## 2023-03-22 NOTE — HISTORY OF PRESENT ILLNESS
[FreeTextEntry1] : patient hre for f/u LUTS on flomax after UDS \par here withwife for f/u\par off trospium and off myrbetriq\par does not feel full after void\par and feels flomax not working \par here for f/u :

## 2023-03-22 NOTE — ASSESSMENT
[FreeTextEntry1] : patient hre for f/u LUTS on flomax after UDS \par here withwife for f/u\par off trospium and off myrbetriq\par does not feel full after void\par and feels flomax not working \par here for f/u :\par 1- increase flomax BID\par 2- rtc if better - 4 weeks\par 3- DARIANA (kidneys  ok with Ct 2019)\par 4- consider surgery if luts no better due to elev pvr aleksandar if hydro on DARIANA seen \par

## 2023-03-27 ENCOUNTER — NON-APPOINTMENT (OUTPATIENT)
Age: 78
End: 2023-03-27

## 2023-03-27 LAB — INR PPP: 2.6

## 2023-04-17 LAB — INR PPP: 2.4

## 2023-05-02 ENCOUNTER — APPOINTMENT (OUTPATIENT)
Dept: ENDOCRINOLOGY | Facility: CLINIC | Age: 78
End: 2023-05-02

## 2023-05-06 ENCOUNTER — NON-APPOINTMENT (OUTPATIENT)
Age: 78
End: 2023-05-06

## 2023-05-08 LAB — INR PPP: 3.7

## 2023-05-14 ENCOUNTER — RX RENEWAL (OUTPATIENT)
Age: 78
End: 2023-05-14

## 2023-05-22 ENCOUNTER — NON-APPOINTMENT (OUTPATIENT)
Age: 78
End: 2023-05-22

## 2023-05-24 NOTE — PATIENT PROFILE ADULT - NSTRANSFERBELONGINGSDISPO_GEN_A_NUR
[address] [facility], [facility], [address] [place] [address] [address]  Phone: [phone]   Fax:     [phone]      Physical Therapy Treatment Note/ Progress Report:       Date:  2023    Patient Name:  Jacklin Burkitt   :  1957  MRN: [id number]  Restrictions/Precautions:    Medical/Treatment Diagnosis Information:   M54.50, G89.29 (ICD-10-CM) - Chronic midline low back pain without sciatica     Insurance/Certification information:   AETNA  Physician Information:   Nicolette Cobos MD  Has the plan of care been signed (Y/N):        []  Yes  [x]  No     Date of Patient follow up with Physician: NS    Is this a Progress Report:     []  Yes  [x]  No      If Yes:  Date Range for reporting period:  Beginnin23 ------------ Endin23    Progress report will be due (10 Rx or 30 days whichever is less):      Recertification will be due (POC Duration  / 90 days whichever is less): 23      Visit # Insurance Allowable Auth Required   In Person 2 High deductable  []  Yes     []  No    Tele Health 0  []  Yes     []  No    Total 2       FOTO Score: Oswestry 40%     Date assessed:  23      Latex Allergy:  [x]NO      []YES  Preferred Language for Healthcare:   [x]English       []other:    Pain level:  3/10     SUBJECTIVE:  Sitting worse    OBJECTIVE: See eval  Observation:   Test measurements:      RESTRICTIONS/PRECAUTIONS: none    Exercises/Interventions:   Therapeutic Ex (09419) Sets/sec Reps Notes/CUES HEP   1K  5x  x   LTR  10x  x   Abd brace  10x  x    x   Bridge with BS  10x  x   Supine PB clamshells GR 15x  x          Prone alt leg raises  5x ea  x   Prone buttock kick  15x  x   Prone glut sets  10x  x                 Manual Intervention (61586)                                                 NMR re-education (53945)   CUES NEEDED    PB rows GR 10x  x   Wall squat  10x  x          WILL B abd / ext 20#  15x with patient

## 2023-05-30 LAB — INR PPP: 3.2

## 2023-06-06 ENCOUNTER — APPOINTMENT (OUTPATIENT)
Dept: DERMATOLOGY | Facility: CLINIC | Age: 78
End: 2023-06-06
Payer: MEDICARE

## 2023-06-06 DIAGNOSIS — L30.9 DERMATITIS, UNSPECIFIED: ICD-10-CM

## 2023-06-06 PROCEDURE — 99204 OFFICE O/P NEW MOD 45 MIN: CPT

## 2023-06-06 RX ORDER — TRIAMCINOLONE ACETONIDE 1 MG/G
0.1 OINTMENT TOPICAL
Qty: 1 | Refills: 0 | Status: ACTIVE | COMMUNITY
Start: 2023-06-06 | End: 1900-01-01

## 2023-06-12 ENCOUNTER — APPOINTMENT (OUTPATIENT)
Dept: RADIOLOGY | Facility: CLINIC | Age: 78
End: 2023-06-12
Payer: MEDICARE

## 2023-06-12 ENCOUNTER — OUTPATIENT (OUTPATIENT)
Dept: OUTPATIENT SERVICES | Facility: HOSPITAL | Age: 78
LOS: 1 days | End: 2023-06-12
Payer: MEDICARE

## 2023-06-12 ENCOUNTER — APPOINTMENT (OUTPATIENT)
Dept: INTERNAL MEDICINE | Facility: CLINIC | Age: 78
End: 2023-06-12
Payer: MEDICARE

## 2023-06-12 VITALS
BODY MASS INDEX: 24.25 KG/M2 | TEMPERATURE: 98.1 F | WEIGHT: 160 LBS | DIASTOLIC BLOOD PRESSURE: 72 MMHG | HEART RATE: 78 BPM | SYSTOLIC BLOOD PRESSURE: 160 MMHG | RESPIRATION RATE: 18 BRPM | HEIGHT: 68 IN | OXYGEN SATURATION: 98 %

## 2023-06-12 DIAGNOSIS — Z95.2 PRESENCE OF PROSTHETIC HEART VALVE: Chronic | ICD-10-CM

## 2023-06-12 DIAGNOSIS — R05.3 CHRONIC COUGH: ICD-10-CM

## 2023-06-12 DIAGNOSIS — Z95.1 PRESENCE OF AORTOCORONARY BYPASS GRAFT: Chronic | ICD-10-CM

## 2023-06-12 DIAGNOSIS — Z00.00 ENCOUNTER FOR GENERAL ADULT MEDICAL EXAMINATION WITHOUT ABNORMAL FINDINGS: ICD-10-CM

## 2023-06-12 PROCEDURE — 71046 X-RAY EXAM CHEST 2 VIEWS: CPT

## 2023-06-12 PROCEDURE — 71046 X-RAY EXAM CHEST 2 VIEWS: CPT | Mod: 26

## 2023-06-12 PROCEDURE — 99214 OFFICE O/P EST MOD 30 MIN: CPT

## 2023-06-18 ENCOUNTER — NON-APPOINTMENT (OUTPATIENT)
Age: 78
End: 2023-06-18

## 2023-06-20 LAB — INR PPP: 2.5

## 2023-07-10 ENCOUNTER — NON-APPOINTMENT (OUTPATIENT)
Age: 78
End: 2023-07-10

## 2023-07-11 LAB — INR PPP: 3.5

## 2023-07-11 RX ORDER — WARFARIN 5 MG/1
5 TABLET ORAL
Qty: 180 | Refills: 3 | Status: ACTIVE | COMMUNITY
Start: 2020-01-28

## 2023-07-20 LAB — INR PPP: 2.5

## 2023-07-25 ENCOUNTER — APPOINTMENT (OUTPATIENT)
Dept: NEUROLOGY | Facility: CLINIC | Age: 78
End: 2023-07-25

## 2023-08-04 ENCOUNTER — APPOINTMENT (OUTPATIENT)
Dept: NEUROLOGY | Facility: CLINIC | Age: 78
End: 2023-08-04
Payer: MEDICARE

## 2023-08-04 VITALS
BODY MASS INDEX: 24.25 KG/M2 | HEIGHT: 68 IN | DIASTOLIC BLOOD PRESSURE: 78 MMHG | HEART RATE: 68 BPM | WEIGHT: 160 LBS | SYSTOLIC BLOOD PRESSURE: 179 MMHG

## 2023-08-04 DIAGNOSIS — G47.52 REM SLEEP BEHAVIOR DISORDER: ICD-10-CM

## 2023-08-04 PROCEDURE — 99214 OFFICE O/P EST MOD 30 MIN: CPT

## 2023-08-04 RX ORDER — LEVODOPA AND CARBIDOPA 245; 61.25 MG/1; MG/1
61.25-245 CAPSULE, EXTENDED RELEASE ORAL
Qty: 630 | Refills: 3 | Status: ACTIVE | COMMUNITY
Start: 2021-12-10 | End: 1900-01-01

## 2023-08-04 NOTE — DISCUSSION/SUMMARY
[FreeTextEntry1] : In summary, the patient is a 78-year-old right-handed man with a history of Parkinson's disease since 2010.  Examination was significant for left greater than right slowing as well as a left-sided tremor when walking.  Overall, the history and examination remains consistent with his diagnosis of Parkinson's disease.  There were no findings on examination that would suggest an atypical parkinsonian disorder.  1. He is currently taking a combination of Mirapex, and Rytary. He had tried to reduce mirapex before, felt worse. As he is worse, will try rytary 245. FUS/DBS is an option.  2. PT 3. As for his REM sleep behavior disorder, for now we will monitor since we are changing his levodopa.  He also would benefit from a swallow evaluation which we can do in a few weeks once the pandemic has slowed down a little bit. 4. Does voice therapy, continue, exercise.   We discussed the above impression, plan and recommendations during the visit. Counseling represented more then 50% of the 30 minute visit time

## 2023-08-04 NOTE — PHYSICAL EXAM
[Person] : oriented to person [Place] : oriented to place [Time] : oriented to time [Registration Intact] : recent registration memory intact [Naming Objects] : no difficulty naming common objects [Fluency] : fluency intact [Cranial Nerves Optic (II)] : visual acuity intact bilaterally,  visual fields full to confrontation, pupils equal round and reactive to light [Motor Strength] : muscle strength was normal in all four extremities [Motor Handedness Right-Handed] : the patient is right hand dominant [Short Term Intact] : short term memory impaired [FreeTextEntry4] : 2/3 delayed recall [FreeTextEntry1] : Seen 2 hours after dose  Facial expression was moderately reduced with mouth open.  His voice was mildly reduced.  Extraocular movements were intact with no obvious square wave jerks.  Tone moderately increased in left>right arm.   Rapid alternating movements were mildly slowed, worse on the left.  Foot tapping was mildly slowed on the  left as well.  He was able to get up from the chair without using his arms.  Posture was mildly stooped.  Gait was slow with shortened stride and decreased arm swing.    Pull test negative.  Left>right resting tremor today.

## 2023-08-04 NOTE — HISTORY OF PRESENT ILLNESS
[FreeTextEntry1] : The patient is a 78-year-old right-handed man who was diagnosed with Parkinson's disease in 2010 when he developed tremor of the left hand.  He actually was followed here at that time, then switched doctors and for the past few years has been followed in [place].  He now returns for geographic convenience.  1. His current medications include Mirapex ER 0.75 mg 2-1-1 times a day, and Rytary 195, 2-2-1 (lowered, but takes care to take it without food. No clear ICD, but plays a lot of bridge. 2. His voice is a little lower, and he has some drooling and dysphagia for pills.  He does have trouble turning over in bed.  His handwriting is getting worse and he is slow in activities of daily living.  He does not need help with ADLs at this time.  His walking is slow without freezing of gait, left leg moves worse.  He has not fallen.  He does have increased tremors in the left more than the right hand.  He has no dyskinesias or fluctuations.   3. His memory is getting worse, no anxiety, no depression.  He has no daytime hallucinations.  No movements in his sleep, and does seem to have very vivid dreams and hypnopompic hallucinations.  He does have constipation which he treats with stool softeners.  He has urinary issues for which he has been given myrbetriq. He has no orthostasis but did have issues with low blood pressure in general.

## 2023-08-07 ENCOUNTER — APPOINTMENT (OUTPATIENT)
Dept: UROLOGY | Facility: CLINIC | Age: 78
End: 2023-08-07
Payer: MEDICARE

## 2023-08-07 DIAGNOSIS — R35.0 FREQUENCY OF MICTURITION: ICD-10-CM

## 2023-08-07 DIAGNOSIS — G20 PARKINSON'S DISEASE: ICD-10-CM

## 2023-08-07 DIAGNOSIS — N32.0 BLADDER-NECK OBSTRUCTION: ICD-10-CM

## 2023-08-07 PROCEDURE — 99213 OFFICE O/P EST LOW 20 MIN: CPT

## 2023-08-07 NOTE — ASSESSMENT
[FreeTextEntry1] : patient here after 6 m  was on flomax daily - no better- never tried BID  UDS ( feb 2023):  dec capacity with DO and elevated Pdet wiht void  but flow rate OK and no PVR avg fluid intake  bowel habits good  nocturia 1 x  day - frequency /urgency with urge INC  no hematuria or dysuria  no better with trsopium or myrbetriq psa ( 3.4 ) feb 2023  CT ( 2019): BPH wiht negative upper tract  here for f/u of urgenc which is bother : 1- Reviwed UDS pattern with Patient  2- discussed MIST of prostate with risk of INC  3- discussed dual therapy with flomax for prostate and possible BOTOX for bladder  4- will refer to dr kishore horan for above

## 2023-08-07 NOTE — HISTORY OF PRESENT ILLNESS
[FreeTextEntry1] : patient here after 6 m  was on flomax daily - no better- never tried BID  UDS ( feb 2023):  dec capacity with DO and elevated Pdet wiht void  but flow rate OK and no PVR avg fluid intake  bowel habits good  nocturia 1 x  day - frequency /urgency with urge INC  no hematuria or dysuria  no better with trsopium or myrbetriq psa ( 3.4 ) feb 2023  CT ( 2019): BPH wiht negative upper tract  here for f/u of urgenc which is bother :

## 2023-08-09 ENCOUNTER — NON-APPOINTMENT (OUTPATIENT)
Age: 78
End: 2023-08-09

## 2023-08-10 LAB — INR PPP: 3.4

## 2023-08-25 ENCOUNTER — NON-APPOINTMENT (OUTPATIENT)
Age: 78
End: 2023-08-25

## 2023-08-28 LAB — INR PPP: 3.2

## 2023-09-18 LAB — INR PPP: 3.5

## 2023-09-21 RX ORDER — RASAGILINE 1 MG/1
1 TABLET ORAL
Qty: 90 | Refills: 3 | Status: ACTIVE | COMMUNITY
Start: 2023-08-31 | End: 1900-01-01

## 2023-09-25 ENCOUNTER — RX RENEWAL (OUTPATIENT)
Age: 78
End: 2023-09-25

## 2023-09-30 ENCOUNTER — NON-APPOINTMENT (OUTPATIENT)
Age: 78
End: 2023-09-30

## 2023-10-02 LAB — INR PPP: 3.5

## 2023-10-04 DIAGNOSIS — M54.2 CERVICALGIA: ICD-10-CM

## 2023-10-09 LAB — INR PPP: 3

## 2023-10-10 ENCOUNTER — APPOINTMENT (OUTPATIENT)
Dept: DERMATOLOGY | Facility: CLINIC | Age: 78
End: 2023-10-10

## 2023-10-26 ENCOUNTER — APPOINTMENT (OUTPATIENT)
Dept: INTERNAL MEDICINE | Facility: CLINIC | Age: 78
End: 2023-10-26
Payer: MEDICARE

## 2023-10-26 VITALS
TEMPERATURE: 98.3 F | DIASTOLIC BLOOD PRESSURE: 82 MMHG | HEART RATE: 71 BPM | SYSTOLIC BLOOD PRESSURE: 140 MMHG | HEIGHT: 68 IN | WEIGHT: 148 LBS | RESPIRATION RATE: 18 BRPM | BODY MASS INDEX: 22.43 KG/M2 | OXYGEN SATURATION: 98 %

## 2023-10-26 DIAGNOSIS — M79.605 PAIN IN LEFT LEG: ICD-10-CM

## 2023-10-26 DIAGNOSIS — M72.2 PLANTAR FASCIAL FIBROMATOSIS: ICD-10-CM

## 2023-10-26 PROCEDURE — 99213 OFFICE O/P EST LOW 20 MIN: CPT

## 2023-11-06 LAB — INR PPP: 3.3

## 2023-11-29 ENCOUNTER — APPOINTMENT (OUTPATIENT)
Dept: CARDIOLOGY | Facility: CLINIC | Age: 78
End: 2023-11-29
Payer: MEDICARE

## 2023-11-29 VITALS
BODY MASS INDEX: 22.43 KG/M2 | HEART RATE: 88 BPM | OXYGEN SATURATION: 100 % | HEIGHT: 68 IN | DIASTOLIC BLOOD PRESSURE: 93 MMHG | WEIGHT: 148 LBS | SYSTOLIC BLOOD PRESSURE: 203 MMHG

## 2023-11-29 VITALS — DIASTOLIC BLOOD PRESSURE: 80 MMHG | SYSTOLIC BLOOD PRESSURE: 140 MMHG

## 2023-11-29 DIAGNOSIS — R03.0 ELEVATED BLOOD-PRESSURE READING, W/OUT DIAGNOSIS OF HYPERTENSION: ICD-10-CM

## 2023-11-29 DIAGNOSIS — I49.8 OTHER SPECIFIED CARDIAC ARRHYTHMIAS: ICD-10-CM

## 2023-11-29 PROCEDURE — 93000 ELECTROCARDIOGRAM COMPLETE: CPT

## 2023-11-29 PROCEDURE — 93790 AMBL BP MNTR W/SW I&R: CPT

## 2023-11-29 PROCEDURE — 99215 OFFICE O/P EST HI 40 MIN: CPT | Mod: 25

## 2023-11-30 ENCOUNTER — APPOINTMENT (OUTPATIENT)
Dept: CARDIOLOGY | Facility: CLINIC | Age: 78
End: 2023-11-30
Payer: MEDICARE

## 2023-11-30 PROCEDURE — 93306 TTE W/DOPPLER COMPLETE: CPT

## 2023-12-16 ENCOUNTER — APPOINTMENT (OUTPATIENT)
Dept: MRI IMAGING | Facility: CLINIC | Age: 78
End: 2023-12-16
Payer: MEDICARE

## 2023-12-16 ENCOUNTER — OUTPATIENT (OUTPATIENT)
Dept: OUTPATIENT SERVICES | Facility: HOSPITAL | Age: 78
LOS: 1 days | End: 2023-12-16
Payer: MEDICARE

## 2023-12-16 DIAGNOSIS — Z95.2 PRESENCE OF PROSTHETIC HEART VALVE: Chronic | ICD-10-CM

## 2023-12-16 DIAGNOSIS — Z95.1 PRESENCE OF AORTOCORONARY BYPASS GRAFT: Chronic | ICD-10-CM

## 2023-12-16 DIAGNOSIS — G20.A1 PARKINSON'S DISEASE WITHOUT DYSKINESIA, WITHOUT MENTION OF FLUCTUATIONS: ICD-10-CM

## 2023-12-16 PROCEDURE — 70551 MRI BRAIN STEM W/O DYE: CPT | Mod: 26

## 2023-12-16 PROCEDURE — 70551 MRI BRAIN STEM W/O DYE: CPT

## 2023-12-19 ENCOUNTER — APPOINTMENT (OUTPATIENT)
Dept: NEUROLOGY | Facility: CLINIC | Age: 78
End: 2023-12-19

## 2023-12-22 LAB — PROTHROMBIN TIME AND INR, PLASMA: 2.4

## 2024-01-26 ENCOUNTER — APPOINTMENT (OUTPATIENT)
Dept: NEUROLOGY | Facility: CLINIC | Age: 79
End: 2024-01-26

## 2024-01-27 ENCOUNTER — NON-APPOINTMENT (OUTPATIENT)
Age: 79
End: 2024-01-27

## 2024-01-29 LAB — INR PPP: 3.4

## 2024-02-07 ENCOUNTER — NON-APPOINTMENT (OUTPATIENT)
Age: 79
End: 2024-02-07

## 2024-02-09 ENCOUNTER — APPOINTMENT (OUTPATIENT)
Dept: INTERNAL MEDICINE | Facility: CLINIC | Age: 79
End: 2024-02-09
Payer: MEDICARE

## 2024-02-09 VITALS
SYSTOLIC BLOOD PRESSURE: 142 MMHG | HEIGHT: 68 IN | RESPIRATION RATE: 18 BRPM | DIASTOLIC BLOOD PRESSURE: 80 MMHG | BODY MASS INDEX: 22.73 KG/M2 | TEMPERATURE: 97.3 F | OXYGEN SATURATION: 98 % | WEIGHT: 150 LBS | HEART RATE: 68 BPM

## 2024-02-09 DIAGNOSIS — E11.9 TYPE 2 DIABETES MELLITUS W/OUT COMPLICATIONS: ICD-10-CM

## 2024-02-09 LAB — HBA1C MFR BLD HPLC: 7.9

## 2024-02-09 PROCEDURE — G0439: CPT

## 2024-02-09 RX ORDER — TRIAMCINOLONE ACETONIDE 1 MG/G
0.1 OINTMENT TOPICAL TWICE DAILY
Qty: 1 | Refills: 0 | Status: DISCONTINUED | COMMUNITY
Start: 2020-03-30 | End: 2024-02-09

## 2024-02-09 RX ORDER — FLUTICASONE PROPIONATE 50 UG/1
50 SPRAY, METERED NASAL DAILY
Qty: 1 | Refills: 1 | Status: DISCONTINUED | COMMUNITY
Start: 2023-02-16 | End: 2024-02-09

## 2024-02-09 RX ORDER — PRAMIPEXOLE DIHYDROCHLORIDE 1.5 MG/1
1.5 TABLET, EXTENDED RELEASE ORAL
Refills: 0 | Status: DISCONTINUED | COMMUNITY
Start: 2021-01-11 | End: 2024-02-09

## 2024-02-09 RX ORDER — MUPIROCIN 2 G/100G
2 CREAM TOPICAL TWICE DAILY
Qty: 1 | Refills: 1 | Status: DISCONTINUED | COMMUNITY
Start: 2019-06-10 | End: 2024-02-09

## 2024-02-09 RX ORDER — CEFUROXIME AXETIL 250 MG/1
250 TABLET ORAL
Qty: 14 | Refills: 0 | Status: DISCONTINUED | COMMUNITY
Start: 2023-06-12 | End: 2024-02-09

## 2024-02-09 RX ORDER — BLOOD-GLUCOSE METER
W/DEVICE EACH MISCELLANEOUS
Qty: 1 | Refills: 0 | Status: DISCONTINUED | COMMUNITY
Start: 2017-01-27 | End: 2024-02-09

## 2024-02-09 RX ORDER — TRIAMCINOLONE ACETONIDE 1 MG/G
0.1 CREAM TOPICAL TWICE DAILY
Qty: 1 | Refills: 0 | Status: DISCONTINUED | COMMUNITY
Start: 2020-03-02 | End: 2024-02-09

## 2024-02-09 RX ORDER — MIRABEGRON 25 MG/1
25 TABLET, FILM COATED, EXTENDED RELEASE ORAL
Qty: 90 | Refills: 3 | Status: DISCONTINUED | COMMUNITY
Start: 2022-02-07 | End: 2024-02-09

## 2024-02-09 RX ORDER — AMOXICILLIN AND CLAVULANATE POTASSIUM 875; 125 MG/1; MG/1
875-125 TABLET, COATED ORAL
Qty: 14 | Refills: 0 | Status: DISCONTINUED | COMMUNITY
Start: 2023-02-16 | End: 2024-02-09

## 2024-02-09 RX ORDER — FLUTICASONE PROPIONATE 50 UG/1
50 SPRAY, METERED NASAL DAILY
Qty: 1 | Refills: 0 | Status: DISCONTINUED | COMMUNITY
Start: 2019-02-26 | End: 2024-02-09

## 2024-02-09 RX ORDER — FLUTICASONE PROPIONATE 50 UG/1
50 SPRAY, METERED NASAL DAILY
Qty: 1 | Refills: 2 | Status: DISCONTINUED | COMMUNITY
Start: 2020-03-02 | End: 2024-02-09

## 2024-02-09 RX ORDER — BLOOD-GLUCOSE METER
W/DEVICE EACH MISCELLANEOUS
Qty: 1 | Refills: 1 | Status: DISCONTINUED | COMMUNITY
Start: 2018-02-13 | End: 2024-02-09

## 2024-02-09 RX ORDER — BENZONATATE 100 MG/1
100 CAPSULE ORAL 3 TIMES DAILY
Qty: 21 | Refills: 0 | Status: DISCONTINUED | COMMUNITY
Start: 2023-02-10 | End: 2024-02-09

## 2024-02-09 RX ORDER — BENZONATATE 200 MG/1
200 CAPSULE ORAL 3 TIMES DAILY
Qty: 30 | Refills: 1 | Status: DISCONTINUED | COMMUNITY
Start: 2023-06-12 | End: 2024-02-09

## 2024-02-09 NOTE — HISTORY OF PRESENT ILLNESS
[Spouse] : spouse [FreeTextEntry1] : cpe [de-identified] : Pt has DM and now has an insulin pump. He has mechanical aortic valve 12/2003. He sees Dr Weiner for endo.  He is going to [place] in one week for 3.5 weeks.

## 2024-02-09 NOTE — PHYSICAL EXAM
[No Acute Distress] : no acute distress [Well Nourished] : well nourished [Well Developed] : well developed [Well-Appearing] : well-appearing [Normal Sclera/Conjunctiva] : normal sclera/conjunctiva [PERRL] : pupils equal round and reactive to light [EOMI] : extraocular movements intact [Normal Outer Ear/Nose] : the outer ears and nose were normal in appearance [Normal Oropharynx] : the oropharynx was normal [No JVD] : no jugular venous distention [No Lymphadenopathy] : no lymphadenopathy [Supple] : supple [Thyroid Normal, No Nodules] : the thyroid was normal and there were no nodules present [No Respiratory Distress] : no respiratory distress  [No Accessory Muscle Use] : no accessory muscle use [Clear to Auscultation] : lungs were clear to auscultation bilaterally [Normal Rate] : normal rate  [Regular Rhythm] : with a regular rhythm [Normal S1, S2] : normal S1 and S2 [No Murmur] : no murmur heard [No Carotid Bruits] : no carotid bruits [No Abdominal Bruit] : a ~M bruit was not heard ~T in the abdomen [No Varicosities] : no varicosities [Pedal Pulses Present] : the pedal pulses are present [No Edema] : there was no peripheral edema [No Palpable Aorta] : no palpable aorta [No Extremity Clubbing/Cyanosis] : no extremity clubbing/cyanosis [Soft] : abdomen soft [Non Tender] : non-tender [Non-distended] : non-distended [No Masses] : no abdominal mass palpated [No HSM] : no HSM [Normal Bowel Sounds] : normal bowel sounds [Normal Posterior Cervical Nodes] : no posterior cervical lymphadenopathy [Normal Anterior Cervical Nodes] : no anterior cervical lymphadenopathy [No CVA Tenderness] : no CVA  tenderness [No Spinal Tenderness] : no spinal tenderness [No Joint Swelling] : no joint swelling [Grossly Normal Strength/Tone] : grossly normal strength/tone [No Rash] : no rash [Coordination Grossly Intact] : coordination grossly intact [No Focal Deficits] : no focal deficits [Normal Gait] : normal gait [Deep Tendon Reflexes (DTR)] : deep tendon reflexes were 2+ and symmetric [Normal Affect] : the affect was normal [Normal Insight/Judgement] : insight and judgment were intact [de-identified] : bradykinesia

## 2024-02-12 LAB — INR PPP: 3.1

## 2024-02-14 LAB — INR PPP: 2.6

## 2024-03-07 NOTE — ED ADULT NURSE NOTE - NS ED NURSE PATIENT LEFT UNIT TIME
Discharge instructions reviewed with patient. Patient verbalizing understanding the importance of following up and any concerns that would require patient to come back to the ED. Vitals stable. Patient able to dress self and ambulate out of ER.     
13:44

## 2024-03-25 ENCOUNTER — RX RENEWAL (OUTPATIENT)
Age: 79
End: 2024-03-25

## 2024-03-25 RX ORDER — TAMSULOSIN HYDROCHLORIDE 0.4 MG/1
0.4 CAPSULE ORAL
Qty: 90 | Refills: 1 | Status: ACTIVE | COMMUNITY
Start: 2024-03-25 | End: 1900-01-01

## 2024-03-26 LAB — INR PPP: 2.7

## 2024-04-05 ENCOUNTER — RX RENEWAL (OUTPATIENT)
Age: 79
End: 2024-04-05

## 2024-04-05 RX ORDER — INSULIN LISPRO 200 [IU]/ML
200 INJECTION, SOLUTION SUBCUTANEOUS
Qty: 1 | Refills: 0 | Status: ACTIVE | COMMUNITY
Start: 2020-08-04 | End: 1900-01-01

## 2024-04-18 ENCOUNTER — NON-APPOINTMENT (OUTPATIENT)
Age: 79
End: 2024-04-18

## 2024-04-18 LAB — INR PPP: 2

## 2024-05-04 ENCOUNTER — NON-APPOINTMENT (OUTPATIENT)
Age: 79
End: 2024-05-04

## 2024-05-08 LAB — INR PPP: 2.7

## 2024-06-11 ENCOUNTER — NON-APPOINTMENT (OUTPATIENT)
Age: 79
End: 2024-06-11

## 2024-06-12 ENCOUNTER — APPOINTMENT (OUTPATIENT)
Dept: CARDIOLOGY | Facility: CLINIC | Age: 79
End: 2024-06-12
Payer: MEDICARE

## 2024-06-12 VITALS
BODY MASS INDEX: 23.79 KG/M2 | SYSTOLIC BLOOD PRESSURE: 190 MMHG | HEART RATE: 63 BPM | HEIGHT: 68 IN | OXYGEN SATURATION: 97 % | WEIGHT: 157 LBS | DIASTOLIC BLOOD PRESSURE: 84 MMHG

## 2024-06-12 DIAGNOSIS — Z95.2 PRESENCE OF PROSTHETIC HEART VALVE: ICD-10-CM

## 2024-06-12 DIAGNOSIS — I25.10 ATHEROSCLEROTIC HEART DISEASE OF NATIVE CORONARY ARTERY W/OUT ANGINA PECTORIS: ICD-10-CM

## 2024-06-12 DIAGNOSIS — I10 ESSENTIAL (PRIMARY) HYPERTENSION: ICD-10-CM

## 2024-06-12 DIAGNOSIS — I45.10 UNSPECIFIED RIGHT BUNDLE-BRANCH BLOCK: ICD-10-CM

## 2024-06-12 LAB — INR PPP: 3

## 2024-06-12 PROCEDURE — 93000 ELECTROCARDIOGRAM COMPLETE: CPT

## 2024-06-12 PROCEDURE — G2211 COMPLEX E/M VISIT ADD ON: CPT

## 2024-06-12 PROCEDURE — 99215 OFFICE O/P EST HI 40 MIN: CPT

## 2024-06-12 RX ORDER — ENALAPRIL MALEATE 5 MG/1
5 TABLET ORAL
Qty: 30 | Refills: 11 | Status: ACTIVE | COMMUNITY
Start: 2021-12-14 | End: 1900-01-01

## 2024-06-12 NOTE — CARDIOLOGY SUMMARY
[de-identified] : 6/12/24 -sinus rhythm at a rate of 74 bpm.  1 supraventricular premature contraction.  2 ventricular premature contractions.  Right bundle branch block.  Nondiagnostic repolarization abnormalities.

## 2024-06-12 NOTE — HISTORY OF PRESENT ILLNESS
[FreeTextEntry1] : Mr. Roxy Lyons presented to the office today for follow-up cardiac evaluation.  I last evaluated the patient in the office on 11/29/2023.  He was accompanied to his visit here today by his wife.  The patient is a 79-year-old male with a history of mechanical aortic valve replacement (2004 secondary to a bicuspid aortic valve associated with severe aortic stenosis), coronary artery disease (status post circumflex artery bypass at the time of the AVR in 2004), mild mitral stenosis, a right bundle branch block, hypertension, a dyslipidemia, diabetes, Parkinson's disease (diagnosed in 2010), GERD, vitamin D deficiency, osteopenia, thrombocytopenia, memory impairment, and left lower extremity cellulitis (June 2022).  The patient has been doing well from a cardiac symptomatic standpoint since his previous visit here on 11/29/2023.  Specifically, he does not describe having experienced chest discomfort or dyspnea on exertion in association with his activities, which are limited secondary to Parkinson's disease.  He has not experienced any episodes of palpitations, presyncope or syncope.  He has not noted orthopnea or paroxysmal nocturnal dyspnea.  He has not recently noted lower extremity edema.  The patient points out that his home blood pressure readings have continued to be quite labile, with systolic readings as high as 200 for brief periods of time, as well as diastolic readings in the 90's.  He states that he is asymptomatic of the high blood pressure readings, however, he does develop lightheadedness in association with low blood pressure readings.  He attributes the low blood pressure readings to his medication that he is being treated with for Parkinson's disease (Rytary).  When he exhibits elevated home blood pressure readings, he takes enalapril 5 mg (averaging less than once per week).  The patient monitors his INR measurements at home with an ContextPlanere device, with the dosage of Coumadin being adjusted through our office accordingly.  His most recent INR measurement on 6/11/2024 was 3.0.  He was instructed at that time to take Coumadin 7.5 mg 5 days/week and 10 mg 2 days/week, and to have a follow-up INR measurement performed in 2 weeks for reassessment.  Laboratory studies performed on 2/3/2023 (on Lipitor 40 mg daily) revealed cholesterol 145, triglycerides 50, HDL 82, and calculated LDL 53.  The liver chemistries were normal.  The BUN and creatinine were 15 and 0.9, respectively.  The potassium level was 4.3.  The glucose level is 107 and the hemoglobin A1c level was 7.5%.  The hemoglobin and hematocrit were 14.4 and 45.1, respectively.  The platelet count was 104,000.  The TSH level was 2.20.  The vitamin D level was 33.0.  A 24-hour ambulatory blood pressure monitoring study performed on 11/29/2023 revealed the average reading to be 140/80 with 42% of systolic readings being in the elevated range and 12% of diastolic readings being in the elevated range.  Echocardiography most recently performed on 11/30/2023 revealed normal cardiac chamber sizes with normal left ventricular wall thickness.  Left ventricular systolic function was normal.  Left ventricular diastolic dysfunction of indeterminate grade was reported.  A well-seated mechanical valve was noted in the aortic position, without significant valvular regurgitation.  The peak systolic gradient across the valve was 67 mmHg and the mean gradient was 30 mmHg, considered to be elevated even in the presence of a mechanical prosthetic valve.  The mean transmitral gradient at a heart rate of 76 bpm was 4.0 mmHg.  Mild to moderate tricuspid regurgitation was demonstrated, with an estimated PASP of 36 mmHg.  Nuclear stress testing performed on 1/23/2019 revealed the patient to exhibit excellent exercise capacity for his age without the inducement of cardiac symptoms, electrocardiographic evidence of myocardial ischemia, or significant arrhythmias.  The cardiac imaging portion of the study revealed normal left ventricular myocardial perfusion, allowing for artifactual attenuation of activity involving the inferior region secondary to overlying diaphragm, which resolved with prone imaging.  Left ventricular wall motion and systolic function were normal, with an estimated ejection fraction of 60%.  Carotid artery Doppler testing performed on 8/3/2020 revealed mild plaque formation involving the common carotid arteries, the bulbar regions, and the internal carotid arteries bilaterally.  No significant stenoses were demonstrated.

## 2024-06-12 NOTE — REVIEW OF SYSTEMS
[Memory Lapses Or Loss] : memory lapses or loss [Negative] : Heme/Lymph [FreeTextEntry5] : See HPI [de-identified] : See HPI

## 2024-06-12 NOTE — PHYSICAL EXAM
[Well Developed] : well developed [No Acute Distress] : no acute distress [Normal Conjunctiva] : normal conjunctiva [No Gallop] : no gallop [Murmur] : murmur [Clear Lung Fields] : clear lung fields [No Respiratory Distress] : no respiratory distress  [Soft] : abdomen soft [Non Tender] : non-tender [Abnormal Gait] : abnormal gait [No Edema] : no edema [No Rash] : no rash [Alert and Oriented] : alert and oriented [de-identified] : Czech male [de-identified] : No JVD is appreciated at a 45 degree angle [de-identified] : II/VI systolic ejection murmur [de-identified] : Mechanical aortic valve sounds [de-identified] : Gait impaired secondary to Parkinson's disease [de-identified] : Parkinson's tremor

## 2024-06-12 NOTE — DISCUSSION/SUMMARY
[FreeTextEntry1] : Mr. Lyons has been doing well from a cardiac symptomatic standpoint since his previous visit here on 11/29/2023.  Specifically, he does not describe having experienced any signs or symptoms to suggest the development of an anginal syndrome, congestive heart failure, or a hemodynamically-compromising arrhythmia.  His activities are limited by Parkinson's disease.  He has continued to note labile home blood pressure readings, including readings as low as 90 systolic (associated with lightheadedness), attributed to therapy for Parkinson's disease with Rytary.  He has been utilizing enalapril 5 mg on an as needed basis, averaging taking this medication less than once per week as needed for elevated blood pressure readings.  His cardiac examination today reveals a crisp mechanical prosthetic aortic valve sound with a systolic ejection murmur, unchanged from his previous visit with me.  He is not exhibiting evidence of congestive heart failure on examination today.  His blood pressure reading today was markedly elevated upon initial presentation to the office today, with a follow-up measurement obtained after his examination revealing the reading to be improved, although still moderately elevated.  His electrocardiogram today reveals sinus rhythm with 1 supraventricular premature contraction, 2 ventricular premature contractions, a right bundle branch block pattern, and nondiagnostic repolarization abnormalities, essentially unchanged from his previous office tracing, allowing for lead placement variation, with the exception of the ventricular ectopy exhibited on the present tracing.  I have reviewed the findings of the 24-hour ambulatory blood pressure monitoring study of 11/29/2023 in detail with the patient today.  Although his blood pressure reading in the office today was markedly elevated upon initial presentation and moderately elevated following his examination, the patient describes his home readings as at times dropping to the 90 systolic range associated with lightheadedness, which he attributes to his medication for Parkinson's disease (Rytary).  Therefore, I have advised him to hold enalapril if his blood pressure reading is less than or equal to 100 systolic.  I have electronically prescribed a renewal for enalapril 5 mg to the patient's pharmacy for him today.  I have reviewed the findings of the echocardiogram of 11/30/2023, the carotid artery Doppler study of 6/3/2020, and the nuclear stress study of 1/23/2019 in detail with the patient and his wife today.  I have reviewed the findings of the blood test report of 2/3/2023 in detail with the patient today, and I have instructed him to continue Lipitor 40 mg daily for the time being.  The importance of proper dietary habits, proper weight maintenance, and regular exercise as tolerated was discussed with the patient today.  The patient will continue to monitor his INR measurements at home with his Alere device, and the dosage of Coumadin will be adjusted accordingly through our office.  I have asked the patient and/or his wife to call me if they should have any questions or problems pertaining to these matters, and especially if the patient should experience any concerning symptoms or exhibit persistently abnormal blood pressure readings.  I have otherwise asked the patient to return to the office for follow-up cardiac evaluation and blood pressure assessment in 6 months, provided he remains clinically stable in the interim.  Follow-up echocardiography will be arranged at that time for reassessment of the mechanical prosthetic aortic valve.

## 2024-06-23 ENCOUNTER — NON-APPOINTMENT (OUTPATIENT)
Age: 79
End: 2024-06-23

## 2024-06-25 LAB — INR PPP: 3.1

## 2024-07-09 ENCOUNTER — APPOINTMENT (OUTPATIENT)
Dept: NEUROLOGY | Facility: CLINIC | Age: 79
End: 2024-07-09
Payer: MEDICARE

## 2024-07-09 VITALS
BODY MASS INDEX: 23.79 KG/M2 | DIASTOLIC BLOOD PRESSURE: 80 MMHG | WEIGHT: 157 LBS | HEART RATE: 74 BPM | HEIGHT: 68 IN | SYSTOLIC BLOOD PRESSURE: 138 MMHG

## 2024-07-09 DIAGNOSIS — G20.A1 PARKINSON'S DISEASE WITHOUT DYSKINESIA, WITHOUT MENTION OF FLUCTUATIONS: ICD-10-CM

## 2024-07-09 PROCEDURE — G2211 COMPLEX E/M VISIT ADD ON: CPT

## 2024-07-09 PROCEDURE — 99214 OFFICE O/P EST MOD 30 MIN: CPT

## 2024-07-10 ENCOUNTER — RX RENEWAL (OUTPATIENT)
Age: 79
End: 2024-07-10

## 2024-07-11 ENCOUNTER — NON-APPOINTMENT (OUTPATIENT)
Age: 79
End: 2024-07-11

## 2024-07-15 LAB — INR PPP: 3

## 2024-07-22 ENCOUNTER — APPOINTMENT (OUTPATIENT)
Dept: CARDIOLOGY | Facility: CLINIC | Age: 79
End: 2024-07-22

## 2024-07-29 LAB — INR PPP: 2.8

## 2024-08-05 ENCOUNTER — RX RENEWAL (OUTPATIENT)
Age: 79
End: 2024-08-05

## 2024-08-07 ENCOUNTER — RX RENEWAL (OUTPATIENT)
Age: 79
End: 2024-08-07

## 2024-08-14 ENCOUNTER — APPOINTMENT (OUTPATIENT)
Dept: OPHTHALMOLOGY | Facility: CLINIC | Age: 79
End: 2024-08-14
Payer: MEDICARE

## 2024-08-14 ENCOUNTER — NON-APPOINTMENT (OUTPATIENT)
Age: 79
End: 2024-08-14

## 2024-08-14 PROCEDURE — 99204 OFFICE O/P NEW MOD 45 MIN: CPT

## 2024-08-14 PROCEDURE — 92083 EXTENDED VISUAL FIELD XM: CPT

## 2024-08-15 LAB — INR PPP: 2.6

## 2024-09-04 ENCOUNTER — RX RENEWAL (OUTPATIENT)
Age: 79
End: 2024-09-04

## 2024-09-05 ENCOUNTER — RX RENEWAL (OUTPATIENT)
Age: 79
End: 2024-09-05

## 2024-09-05 LAB — INR PPP: 3.5

## 2024-09-10 ENCOUNTER — APPOINTMENT (OUTPATIENT)
Dept: INTERNAL MEDICINE | Facility: CLINIC | Age: 79
End: 2024-09-10
Payer: MEDICARE

## 2024-09-10 VITALS
WEIGHT: 156 LBS | HEIGHT: 68 IN | HEART RATE: 80 BPM | TEMPERATURE: 97.9 F | DIASTOLIC BLOOD PRESSURE: 80 MMHG | OXYGEN SATURATION: 98 % | BODY MASS INDEX: 23.64 KG/M2 | SYSTOLIC BLOOD PRESSURE: 126 MMHG | RESPIRATION RATE: 16 BRPM

## 2024-09-10 DIAGNOSIS — M54.50 LOW BACK PAIN, UNSPECIFIED: ICD-10-CM

## 2024-09-10 DIAGNOSIS — G20.A1 PARKINSON'S DISEASE WITHOUT DYSKINESIA, WITHOUT MENTION OF FLUCTUATIONS: ICD-10-CM

## 2024-09-10 DIAGNOSIS — E11.9 TYPE 2 DIABETES MELLITUS W/OUT COMPLICATIONS: ICD-10-CM

## 2024-09-10 PROCEDURE — 99214 OFFICE O/P EST MOD 30 MIN: CPT

## 2024-09-10 NOTE — PLAN
[FreeTextEntry1] : continue medications Atorvastatin Enalapril Humalog Warfarin  chronic medical conditions HTN HLD DM  Apply heat Take Tylenol PRN  Sent for Lower Spine X-ray  cannot take Steroids Diabetes not to take Advil or Motrin on Warfarin

## 2024-09-10 NOTE — PHYSICAL EXAM
[No Acute Distress] : no acute distress [Well Nourished] : well nourished [Well Developed] : well developed [Well-Appearing] : well-appearing [Normal Voice/Communication] : normal voice/communication [Normal Sclera/Conjunctiva] : normal sclera/conjunctiva [PERRL] : pupils equal round and reactive to light [EOMI] : extraocular movements intact [Normal Outer Ear/Nose] : the outer ears and nose were normal in appearance [Normal Oropharynx] : the oropharynx was normal [Normal TMs] : both tympanic membranes were normal [No JVD] : no jugular venous distention [No Lymphadenopathy] : no lymphadenopathy [Supple] : supple [Thyroid Normal, No Nodules] : the thyroid was normal and there were no nodules present [No Respiratory Distress] : no respiratory distress  [No Accessory Muscle Use] : no accessory muscle use [Clear to Auscultation] : lungs were clear to auscultation bilaterally [Normal Rate] : normal rate  [Regular Rhythm] : with a regular rhythm [Normal S1, S2] : normal S1 and S2 [No Murmur] : no murmur heard [No Carotid Bruits] : no carotid bruits [No Abdominal Bruit] : a ~M bruit was not heard ~T in the abdomen [No Varicosities] : no varicosities [Pedal Pulses Present] : the pedal pulses are present [No Edema] : there was no peripheral edema [No Palpable Aorta] : no palpable aorta [No Extremity Clubbing/Cyanosis] : no extremity clubbing/cyanosis [Soft] : abdomen soft [Non Tender] : non-tender [Non-distended] : non-distended [No Masses] : no abdominal mass palpated [No HSM] : no HSM [Normal Bowel Sounds] : normal bowel sounds [Normal Supraclavicular Nodes] : no supraclavicular lymphadenopathy [Normal Posterior Cervical Nodes] : no posterior cervical lymphadenopathy [Normal Anterior Cervical Nodes] : no anterior cervical lymphadenopathy [No CVA Tenderness] : no CVA  tenderness [No Spinal Tenderness] : no spinal tenderness [No Joint Swelling] : no joint swelling [Grossly Normal Strength/Tone] : grossly normal strength/tone [No Rash] : no rash [Coordination Grossly Intact] : coordination grossly intact [No Focal Deficits] : no focal deficits [Normal Gait] : normal gait [Deep Tendon Reflexes (DTR)] : deep tendon reflexes were 2+ and symmetric [Speech Grossly Normal] : speech grossly normal [Memory Grossly Normal] : memory grossly normal [Normal Affect] : the affect was normal [Alert and Oriented x3] : oriented to person, place, and time [Normal Mood] : the mood was normal [Normal Insight/Judgement] : insight and judgment were intact [de-identified] : tenderness coccyx

## 2024-09-10 NOTE — HISTORY OF PRESENT ILLNESS
[___ Days ago] : [unfilled] days ago [Episodic] : episodic [Moderate] : moderate [Ice] : ice [Heat] : heat [Stable] : stable [Spouse] : spouse [de-identified] : lower [FreeTextEntry8] : ALNETTE HOLM is a 79 year old M who presents today for an acute visit complaining of lower back pain. Pt has a hx of HTN, HLD, DM, CAD and Parkinson's. Pt states experiencing a fall three days ago due to loss of balance. Pt has been experiencing difficulty bending over. Pt has been taking Tylenol and applying ice and head to the area for his symptoms. Pt's wife states the patient shuffling while ambulating due to his Parkinson's. Pt denies tingling, numbness or pain radiating down his leg. did not strike head

## 2024-09-10 NOTE — END OF VISIT
[FreeTextEntry3] : "I, Yong Jung, personally scribed the services dictated to me by Dr. Migue Cruz MD in this documentation on 09/10/2024"   "I Dr. Migue Cruz MD, personally performed the services described in this documentation on 09/10/2024 for the patient as scribed by Yong Jung in my presence. I have reviewed and verified that all the information is accurate and true."

## 2024-09-10 NOTE — HEALTH RISK ASSESSMENT

## 2024-09-28 LAB — INR PPP: 2.4

## 2024-10-18 ENCOUNTER — NON-APPOINTMENT (OUTPATIENT)
Age: 79
End: 2024-10-18

## 2024-10-21 LAB — INR PPP: 2.5

## 2024-10-22 ENCOUNTER — RX RENEWAL (OUTPATIENT)
Age: 79
End: 2024-10-22

## 2024-11-02 ENCOUNTER — RX RENEWAL (OUTPATIENT)
Age: 79
End: 2024-11-02

## 2024-11-05 ENCOUNTER — RX RENEWAL (OUTPATIENT)
Age: 79
End: 2024-11-05

## 2024-11-12 ENCOUNTER — RX RENEWAL (OUTPATIENT)
Age: 79
End: 2024-11-12

## 2024-11-13 LAB — INR PPP: 2.8

## 2024-12-12 LAB — INR PPP: 2.4

## 2024-12-13 ENCOUNTER — APPOINTMENT (OUTPATIENT)
Dept: UROLOGY | Facility: CLINIC | Age: 79
End: 2024-12-13
Payer: MEDICARE

## 2024-12-13 ENCOUNTER — NON-APPOINTMENT (OUTPATIENT)
Age: 79
End: 2024-12-13

## 2024-12-13 VITALS — DIASTOLIC BLOOD PRESSURE: 78 MMHG | SYSTOLIC BLOOD PRESSURE: 126 MMHG | HEART RATE: 66 BPM

## 2024-12-13 DIAGNOSIS — N39.41 URGE INCONTINENCE: ICD-10-CM

## 2024-12-13 DIAGNOSIS — N32.0 BLADDER-NECK OBSTRUCTION: ICD-10-CM

## 2024-12-13 DIAGNOSIS — R35.0 FREQUENCY OF MICTURITION: ICD-10-CM

## 2024-12-13 DIAGNOSIS — R39.15 URGENCY OF URINATION: ICD-10-CM

## 2024-12-13 PROCEDURE — 99214 OFFICE O/P EST MOD 30 MIN: CPT

## 2024-12-14 ENCOUNTER — NON-APPOINTMENT (OUTPATIENT)
Age: 79
End: 2024-12-14

## 2024-12-14 LAB
APPEARANCE: CLEAR
BACTERIA: NEGATIVE /HPF
BILIRUBIN URINE: NEGATIVE
BLOOD URINE: NEGATIVE
CAST: 0 /LPF
COLOR: NORMAL
EPITHELIAL CELLS: 1 /HPF
GLUCOSE QUALITATIVE U: 500 MG/DL
KETONES URINE: ABNORMAL MG/DL
LEUKOCYTE ESTERASE URINE: ABNORMAL
MICROSCOPIC-UA: NORMAL
NITRITE URINE: NEGATIVE
PH URINE: 6.5
PROTEIN URINE: NEGATIVE MG/DL
RED BLOOD CELLS URINE: 1 /HPF
SPECIFIC GRAVITY URINE: 1.02
UROBILINOGEN URINE: 0.2 MG/DL
WHITE BLOOD CELLS URINE: 1 /HPF

## 2024-12-19 ENCOUNTER — APPOINTMENT (OUTPATIENT)
Dept: ULTRASOUND IMAGING | Facility: CLINIC | Age: 79
End: 2024-12-19
Payer: MEDICARE

## 2024-12-19 ENCOUNTER — OUTPATIENT (OUTPATIENT)
Dept: OUTPATIENT SERVICES | Facility: HOSPITAL | Age: 79
LOS: 1 days | End: 2024-12-19
Payer: MEDICARE

## 2024-12-19 DIAGNOSIS — Z95.2 PRESENCE OF PROSTHETIC HEART VALVE: Chronic | ICD-10-CM

## 2024-12-19 DIAGNOSIS — Z95.1 PRESENCE OF AORTOCORONARY BYPASS GRAFT: Chronic | ICD-10-CM

## 2024-12-19 DIAGNOSIS — N32.0 BLADDER-NECK OBSTRUCTION: ICD-10-CM

## 2024-12-19 PROCEDURE — 76857 US EXAM PELVIC LIMITED: CPT

## 2024-12-19 PROCEDURE — 76857 US EXAM PELVIC LIMITED: CPT | Mod: 26

## 2024-12-23 RX ORDER — FINASTERIDE 5 MG/1
5 TABLET, FILM COATED ORAL
Qty: 30 | Refills: 5 | Status: ACTIVE | COMMUNITY
Start: 2024-12-23 | End: 1900-01-01

## 2025-02-04 ENCOUNTER — APPOINTMENT (OUTPATIENT)
Dept: NEUROLOGY | Facility: CLINIC | Age: 80
End: 2025-02-04

## 2025-02-05 ENCOUNTER — RX RENEWAL (OUTPATIENT)
Age: 80
End: 2025-02-05

## 2025-02-15 LAB — INR PPP: 3.2

## 2025-02-20 ENCOUNTER — APPOINTMENT (OUTPATIENT)
Dept: INTERNAL MEDICINE | Facility: CLINIC | Age: 80
End: 2025-02-20
Payer: MEDICARE

## 2025-02-20 VITALS
DIASTOLIC BLOOD PRESSURE: 68 MMHG | RESPIRATION RATE: 16 BRPM | HEART RATE: 80 BPM | WEIGHT: 152 LBS | HEIGHT: 68 IN | BODY MASS INDEX: 23.04 KG/M2 | TEMPERATURE: 97.8 F | OXYGEN SATURATION: 98 % | SYSTOLIC BLOOD PRESSURE: 122 MMHG

## 2025-02-20 DIAGNOSIS — Z87.898 PERSONAL HISTORY OF OTHER SPECIFIED CONDITIONS: ICD-10-CM

## 2025-02-20 DIAGNOSIS — Z87.2 PERSONAL HISTORY OF DISEASES OF THE SKIN AND SUBCUTANEOUS TISSUE: ICD-10-CM

## 2025-02-20 DIAGNOSIS — R06.09 OTHER FORMS OF DYSPNEA: ICD-10-CM

## 2025-02-20 DIAGNOSIS — R21 RASH AND OTHER NONSPECIFIC SKIN ERUPTION: ICD-10-CM

## 2025-02-20 DIAGNOSIS — Z00.00 ENCOUNTER FOR GENERAL ADULT MEDICAL EXAMINATION W/OUT ABNORMAL FINDINGS: ICD-10-CM

## 2025-02-20 DIAGNOSIS — L03.116 CELLULITIS OF LEFT LOWER LIMB: ICD-10-CM

## 2025-02-20 DIAGNOSIS — Z86.39 PERSONAL HISTORY OF OTHER ENDOCRINE, NUTRITIONAL AND METABOLIC DISEASE: ICD-10-CM

## 2025-02-20 DIAGNOSIS — J20.8 ACUTE BRONCHITIS DUE TO OTHER SPECIFIED ORGANISMS: ICD-10-CM

## 2025-02-20 DIAGNOSIS — Z87.19 PERSONAL HISTORY OF OTHER DISEASES OF THE DIGESTIVE SYSTEM: ICD-10-CM

## 2025-02-20 DIAGNOSIS — Z87.39 PERSONAL HISTORY OF OTHER DISEASES OF THE MUSCULOSKELETAL SYSTEM AND CONNECTIVE TISSUE: ICD-10-CM

## 2025-02-20 DIAGNOSIS — L08.9 LOCAL INFECTION OF THE SKIN AND SUBCUTANEOUS TISSUE, UNSPECIFIED: ICD-10-CM

## 2025-02-20 DIAGNOSIS — R09.89 OTHER SPECIFIED SYMPTOMS AND SIGNS INVOLVING THE CIRCULATORY AND RESPIRATORY SYSTEMS: ICD-10-CM

## 2025-02-20 DIAGNOSIS — M79.605 PAIN IN LEFT LEG: ICD-10-CM

## 2025-02-20 PROCEDURE — G0439: CPT

## 2025-02-25 ENCOUNTER — APPOINTMENT (OUTPATIENT)
Dept: NEUROLOGY | Facility: CLINIC | Age: 80
End: 2025-02-25
Payer: MEDICARE

## 2025-02-25 VITALS
SYSTOLIC BLOOD PRESSURE: 145 MMHG | HEIGHT: 68 IN | BODY MASS INDEX: 22.73 KG/M2 | WEIGHT: 150 LBS | DIASTOLIC BLOOD PRESSURE: 60 MMHG | HEART RATE: 71 BPM

## 2025-02-25 DIAGNOSIS — G47.52 REM SLEEP BEHAVIOR DISORDER: ICD-10-CM

## 2025-02-25 DIAGNOSIS — R13.10 DYSPHAGIA, UNSPECIFIED: ICD-10-CM

## 2025-02-25 DIAGNOSIS — G20.A1 PARKINSON'S DISEASE WITHOUT DYSKINESIA, WITHOUT MENTION OF FLUCTUATIONS: ICD-10-CM

## 2025-02-25 PROCEDURE — 99214 OFFICE O/P EST MOD 30 MIN: CPT

## 2025-02-25 PROCEDURE — G2211 COMPLEX E/M VISIT ADD ON: CPT

## 2025-03-04 ENCOUNTER — APPOINTMENT (OUTPATIENT)
Dept: CARDIOLOGY | Facility: CLINIC | Age: 80
End: 2025-03-04

## 2025-03-05 ENCOUNTER — NON-APPOINTMENT (OUTPATIENT)
Age: 80
End: 2025-03-05

## 2025-03-06 LAB — INR PPP: 3.7

## 2025-03-25 LAB — INR PPP: 2.4

## 2025-04-02 LAB — INR PPP: 2.7

## 2025-04-19 ENCOUNTER — NON-APPOINTMENT (OUTPATIENT)
Age: 80
End: 2025-04-19

## 2025-04-22 LAB — INR PPP: 2.4

## 2025-04-23 ENCOUNTER — APPOINTMENT (OUTPATIENT)
Dept: UROLOGY | Facility: CLINIC | Age: 80
End: 2025-04-23
Payer: MEDICARE

## 2025-04-23 DIAGNOSIS — R39.15 URGENCY OF URINATION: ICD-10-CM

## 2025-04-23 DIAGNOSIS — N32.0 BLADDER-NECK OBSTRUCTION: ICD-10-CM

## 2025-04-23 DIAGNOSIS — N39.41 URGE INCONTINENCE: ICD-10-CM

## 2025-04-23 PROCEDURE — 99213 OFFICE O/P EST LOW 20 MIN: CPT

## 2025-04-24 ENCOUNTER — APPOINTMENT (OUTPATIENT)
Dept: INTERNAL MEDICINE | Facility: CLINIC | Age: 80
End: 2025-04-24
Payer: MEDICARE

## 2025-04-24 VITALS
DIASTOLIC BLOOD PRESSURE: 66 MMHG | TEMPERATURE: 97.7 F | WEIGHT: 150 LBS | HEIGHT: 68 IN | OXYGEN SATURATION: 97 % | RESPIRATION RATE: 16 BRPM | SYSTOLIC BLOOD PRESSURE: 108 MMHG | HEART RATE: 83 BPM | BODY MASS INDEX: 22.73 KG/M2

## 2025-04-24 DIAGNOSIS — E78.5 HYPERLIPIDEMIA, UNSPECIFIED: ICD-10-CM

## 2025-04-24 DIAGNOSIS — R10.84 GENERALIZED ABDOMINAL PAIN: ICD-10-CM

## 2025-04-24 DIAGNOSIS — G20.A1 PARKINSON'S DISEASE WITHOUT DYSKINESIA, WITHOUT MENTION OF FLUCTUATIONS: ICD-10-CM

## 2025-04-24 DIAGNOSIS — E11.9 TYPE 2 DIABETES MELLITUS W/OUT COMPLICATIONS: ICD-10-CM

## 2025-04-24 DIAGNOSIS — I10 ESSENTIAL (PRIMARY) HYPERTENSION: ICD-10-CM

## 2025-04-24 PROCEDURE — 99214 OFFICE O/P EST MOD 30 MIN: CPT

## 2025-04-24 PROCEDURE — G2211 COMPLEX E/M VISIT ADD ON: CPT

## 2025-05-09 LAB — INR PPP: 2.4

## 2025-05-12 ENCOUNTER — NON-APPOINTMENT (OUTPATIENT)
Age: 80
End: 2025-05-12

## 2025-05-13 LAB — INR PPP: 3.4

## 2025-05-26 ENCOUNTER — RX RENEWAL (OUTPATIENT)
Age: 80
End: 2025-05-26

## 2025-06-02 LAB — INR PPP: 2.5

## 2025-06-18 LAB — INR PPP: 3.1

## 2025-06-24 RX ORDER — LEVODOPA AND CARBIDOPA 95; 23.75 MG/1; MG/1
23.75-95 CAPSULE, EXTENDED RELEASE ORAL
Qty: 210 | Refills: 5 | Status: ACTIVE | COMMUNITY
Start: 2025-06-24 | End: 1900-01-01

## 2025-07-25 LAB — INR PPP: 2.5

## 2025-08-16 ENCOUNTER — NON-APPOINTMENT (OUTPATIENT)
Age: 80
End: 2025-08-16

## 2025-08-18 LAB — INR PPP: 2.6

## 2025-09-09 LAB — INR PPP: 2.5

## 2025-09-19 ENCOUNTER — NON-APPOINTMENT (OUTPATIENT)
Age: 80
End: 2025-09-19

## 2025-09-19 ENCOUNTER — APPOINTMENT (OUTPATIENT)
Dept: NEUROLOGY | Facility: CLINIC | Age: 80
End: 2025-09-19
Payer: MEDICARE

## 2025-09-19 VITALS
SYSTOLIC BLOOD PRESSURE: 173 MMHG | HEIGHT: 67.5 IN | DIASTOLIC BLOOD PRESSURE: 77 MMHG | BODY MASS INDEX: 23.27 KG/M2 | HEART RATE: 46 BPM | WEIGHT: 150 LBS

## 2025-09-19 DIAGNOSIS — G47.52 REM SLEEP BEHAVIOR DISORDER: ICD-10-CM

## 2025-09-19 DIAGNOSIS — G20.A1 PARKINSON'S DISEASE WITHOUT DYSKINESIA, WITHOUT MENTION OF FLUCTUATIONS: ICD-10-CM

## 2025-09-19 PROCEDURE — 99214 OFFICE O/P EST MOD 30 MIN: CPT

## 2025-09-19 PROCEDURE — G2211 COMPLEX E/M VISIT ADD ON: CPT

## 2025-09-19 RX ORDER — RIVASTIGMINE TARTRATE 1.5 MG/1
1.5 CAPSULE ORAL
Qty: 60 | Refills: 5 | Status: ACTIVE | COMMUNITY
Start: 2025-09-19 | End: 1900-01-01